# Patient Record
Sex: FEMALE | Race: WHITE | Employment: UNEMPLOYED | URBAN - METROPOLITAN AREA
[De-identification: names, ages, dates, MRNs, and addresses within clinical notes are randomized per-mention and may not be internally consistent; named-entity substitution may affect disease eponyms.]

---

## 2017-06-26 PROBLEM — D64.9 ANEMIA: Status: ACTIVE | Noted: 2017-06-26

## 2017-06-26 PROBLEM — R60.0 BILATERAL LEG EDEMA: Status: ACTIVE | Noted: 2017-06-26

## 2017-08-01 PROBLEM — I65.23 BILATERAL CAROTID ARTERY STENOSIS: Status: ACTIVE | Noted: 2017-08-01

## 2017-10-01 ENCOUNTER — HOSPITAL ENCOUNTER (INPATIENT)
Age: 82
LOS: 4 days | Discharge: HOME HEALTH | DRG: 065 | End: 2017-10-05
Attending: EMERGENCY MEDICINE | Admitting: INTERNAL MEDICINE
Payer: MEDICARE

## 2017-10-01 ENCOUNTER — APPOINTMENT (OUTPATIENT)
Dept: CT IMAGING | Facility: CLINIC | Age: 82
DRG: 065 | End: 2017-10-01
Payer: MEDICARE

## 2017-10-01 ENCOUNTER — APPOINTMENT (OUTPATIENT)
Dept: GENERAL RADIOLOGY | Facility: CLINIC | Age: 82
DRG: 065 | End: 2017-10-01
Payer: MEDICARE

## 2017-10-01 DIAGNOSIS — R26.81 UNSTEADINESS ON FEET: Primary | ICD-10-CM

## 2017-10-01 LAB
-: ABNORMAL
ABSOLUTE EOS #: 0.1 K/UL (ref 0–0.4)
ABSOLUTE LYMPH #: 1.5 K/UL (ref 1–4.8)
ABSOLUTE MONO #: 0.6 K/UL (ref 0.1–1.2)
AMORPHOUS: ABNORMAL
ANION GAP SERPL CALCULATED.3IONS-SCNC: 13 MMOL/L (ref 9–17)
BACTERIA: ABNORMAL
BASOPHILS # BLD: 1 %
BASOPHILS ABSOLUTE: 0.1 K/UL (ref 0–0.2)
BILIRUBIN URINE: NEGATIVE
BUN BLDV-MCNC: 20 MG/DL (ref 8–23)
BUN/CREAT BLD: ABNORMAL (ref 9–20)
CALCIUM SERPL-MCNC: 9.8 MG/DL (ref 8.6–10.4)
CASTS UA: ABNORMAL /LPF (ref 0–2)
CHLORIDE BLD-SCNC: 97 MMOL/L (ref 98–107)
CO2: 29 MMOL/L (ref 20–31)
COLOR: YELLOW
COMMENT UA: ABNORMAL
CREAT SERPL-MCNC: 0.8 MG/DL (ref 0.5–0.9)
CRYSTALS, UA: ABNORMAL /HPF
DIFFERENTIAL TYPE: NORMAL
EOSINOPHILS RELATIVE PERCENT: 2 %
EPITHELIAL CELLS UA: ABNORMAL /HPF (ref 0–5)
GFR AFRICAN AMERICAN: >60 ML/MIN
GFR NON-AFRICAN AMERICAN: >60 ML/MIN
GFR SERPL CREATININE-BSD FRML MDRD: ABNORMAL ML/MIN/{1.73_M2}
GFR SERPL CREATININE-BSD FRML MDRD: ABNORMAL ML/MIN/{1.73_M2}
GLUCOSE BLD-MCNC: 116 MG/DL (ref 70–99)
GLUCOSE URINE: NEGATIVE
HCT VFR BLD CALC: 39.2 % (ref 36–46)
HEMOGLOBIN: 13.2 G/DL (ref 12–16)
INR BLD: 1.1
KETONES, URINE: NEGATIVE
LEUKOCYTE ESTERASE, URINE: ABNORMAL
LYMPHOCYTES # BLD: 25 %
MCH RBC QN AUTO: 30.9 PG (ref 26–34)
MCHC RBC AUTO-ENTMCNC: 33.8 G/DL (ref 31–37)
MCV RBC AUTO: 91.4 FL (ref 80–100)
MONOCYTES # BLD: 11 %
MUCUS: ABNORMAL
NITRITE, URINE: NEGATIVE
OTHER OBSERVATIONS UA: ABNORMAL
PARTIAL THROMBOPLASTIN TIME: 28.8 SEC (ref 21.3–31.3)
PDW BLD-RTO: 13.5 % (ref 12.5–15.4)
PH UA: 5.5 (ref 5–8)
PLATELET # BLD: 245 K/UL (ref 140–450)
PLATELET ESTIMATE: NORMAL
PMV BLD AUTO: 7.9 FL (ref 6–12)
POTASSIUM SERPL-SCNC: 4.3 MMOL/L (ref 3.7–5.3)
PROTEIN UA: NEGATIVE
PROTHROMBIN TIME: 11.3 SEC (ref 9.4–12.6)
RBC # BLD: 4.29 M/UL (ref 4–5.2)
RBC # BLD: NORMAL 10*6/UL
RBC UA: ABNORMAL /HPF (ref 0–2)
RENAL EPITHELIAL, UA: ABNORMAL /HPF
SEG NEUTROPHILS: 61 %
SEGMENTED NEUTROPHILS ABSOLUTE COUNT: 3.7 K/UL (ref 1.8–7.7)
SODIUM BLD-SCNC: 139 MMOL/L (ref 135–144)
SPECIFIC GRAVITY UA: 1 (ref 1–1.03)
TRICHOMONAS: ABNORMAL
TROPONIN INTERP: NORMAL
TROPONIN T: <0.03 NG/ML
TURBIDITY: ABNORMAL
URINE HGB: ABNORMAL
UROBILINOGEN, URINE: NORMAL
WBC # BLD: 6.1 K/UL (ref 3.5–11)
WBC # BLD: NORMAL 10*3/UL
WBC UA: ABNORMAL /HPF (ref 0–5)
YEAST: ABNORMAL

## 2017-10-01 PROCEDURE — 99285 EMERGENCY DEPT VISIT HI MDM: CPT

## 2017-10-01 PROCEDURE — 6370000000 HC RX 637 (ALT 250 FOR IP): Performed by: EMERGENCY MEDICINE

## 2017-10-01 PROCEDURE — 71010 XR CHEST PORTABLE: CPT

## 2017-10-01 PROCEDURE — 6360000004 HC RX CONTRAST MEDICATION: Performed by: EMERGENCY MEDICINE

## 2017-10-01 PROCEDURE — 87086 URINE CULTURE/COLONY COUNT: CPT

## 2017-10-01 PROCEDURE — 80048 BASIC METABOLIC PNL TOTAL CA: CPT

## 2017-10-01 PROCEDURE — 81001 URINALYSIS AUTO W/SCOPE: CPT

## 2017-10-01 PROCEDURE — 1200000000 HC SEMI PRIVATE

## 2017-10-01 PROCEDURE — 85610 PROTHROMBIN TIME: CPT

## 2017-10-01 PROCEDURE — 2580000003 HC RX 258: Performed by: NURSE PRACTITIONER

## 2017-10-01 PROCEDURE — 6360000002 HC RX W HCPCS: Performed by: EMERGENCY MEDICINE

## 2017-10-01 PROCEDURE — 2580000003 HC RX 258: Performed by: EMERGENCY MEDICINE

## 2017-10-01 PROCEDURE — 36415 COLL VENOUS BLD VENIPUNCTURE: CPT

## 2017-10-01 PROCEDURE — 70450 CT HEAD/BRAIN W/O DYE: CPT

## 2017-10-01 PROCEDURE — 93005 ELECTROCARDIOGRAM TRACING: CPT

## 2017-10-01 PROCEDURE — 85730 THROMBOPLASTIN TIME PARTIAL: CPT

## 2017-10-01 PROCEDURE — 85025 COMPLETE CBC W/AUTO DIFF WBC: CPT

## 2017-10-01 PROCEDURE — 71260 CT THORAX DX C+: CPT

## 2017-10-01 PROCEDURE — 84484 ASSAY OF TROPONIN QUANT: CPT

## 2017-10-01 RX ORDER — SPIRONOLACTONE 25 MG/1
25 TABLET ORAL DAILY
Status: DISCONTINUED | OUTPATIENT
Start: 2017-10-02 | End: 2017-10-05 | Stop reason: HOSPADM

## 2017-10-01 RX ORDER — SODIUM CHLORIDE 9 MG/ML
INJECTION, SOLUTION INTRAVENOUS CONTINUOUS
Status: DISCONTINUED | OUTPATIENT
Start: 2017-10-01 | End: 2017-10-05 | Stop reason: HOSPADM

## 2017-10-01 RX ORDER — LEVOFLOXACIN 5 MG/ML
500 INJECTION, SOLUTION INTRAVENOUS ONCE
Status: COMPLETED | OUTPATIENT
Start: 2017-10-01 | End: 2017-10-01

## 2017-10-01 RX ORDER — CARVEDILOL 6.25 MG/1
6.25 TABLET ORAL 2 TIMES DAILY WITH MEALS
Status: DISCONTINUED | OUTPATIENT
Start: 2017-10-02 | End: 2017-10-05 | Stop reason: HOSPADM

## 2017-10-01 RX ORDER — SODIUM CHLORIDE 0.9 % (FLUSH) 0.9 %
10 SYRINGE (ML) INJECTION PRN
Status: DISCONTINUED | OUTPATIENT
Start: 2017-10-01 | End: 2017-10-01 | Stop reason: SDUPTHER

## 2017-10-01 RX ORDER — SODIUM CHLORIDE 0.9 % (FLUSH) 0.9 %
10 SYRINGE (ML) INJECTION EVERY 12 HOURS SCHEDULED
Status: DISCONTINUED | OUTPATIENT
Start: 2017-10-01 | End: 2017-10-05 | Stop reason: HOSPADM

## 2017-10-01 RX ORDER — LEVOFLOXACIN 5 MG/ML
250 INJECTION, SOLUTION INTRAVENOUS EVERY 24 HOURS
Status: DISCONTINUED | OUTPATIENT
Start: 2017-10-02 | End: 2017-10-05

## 2017-10-01 RX ORDER — ACETAMINOPHEN 325 MG/1
650 TABLET ORAL EVERY 4 HOURS PRN
Status: DISCONTINUED | OUTPATIENT
Start: 2017-10-01 | End: 2017-10-05 | Stop reason: HOSPADM

## 2017-10-01 RX ORDER — SODIUM CHLORIDE 0.9 % (FLUSH) 0.9 %
10 SYRINGE (ML) INJECTION PRN
Status: DISCONTINUED | OUTPATIENT
Start: 2017-10-01 | End: 2017-10-05 | Stop reason: HOSPADM

## 2017-10-01 RX ORDER — PRAVASTATIN SODIUM 40 MG
80 TABLET ORAL NIGHTLY
Status: DISCONTINUED | OUTPATIENT
Start: 2017-10-01 | End: 2017-10-05 | Stop reason: HOSPADM

## 2017-10-01 RX ORDER — ONDANSETRON 2 MG/ML
4 INJECTION INTRAMUSCULAR; INTRAVENOUS EVERY 6 HOURS PRN
Status: DISCONTINUED | OUTPATIENT
Start: 2017-10-01 | End: 2017-10-05 | Stop reason: HOSPADM

## 2017-10-01 RX ORDER — 0.9 % SODIUM CHLORIDE 0.9 %
70 INTRAVENOUS SOLUTION INTRAVENOUS ONCE
Status: COMPLETED | OUTPATIENT
Start: 2017-10-01 | End: 2017-10-01

## 2017-10-01 RX ADMIN — SODIUM CHLORIDE: 9 INJECTION, SOLUTION INTRAVENOUS at 23:35

## 2017-10-01 RX ADMIN — LEVOFLOXACIN 500 MG: 5 INJECTION, SOLUTION INTRAVENOUS at 21:04

## 2017-10-01 RX ADMIN — Medication 10 ML: at 18:17

## 2017-10-01 RX ADMIN — SODIUM CHLORIDE 70 ML: 9 INJECTION, SOLUTION INTRAVENOUS at 18:17

## 2017-10-01 RX ADMIN — FLUCONAZOLE 150 MG: 50 TABLET ORAL at 21:07

## 2017-10-01 RX ADMIN — IOVERSOL 70 ML: 741 INJECTION INTRA-ARTERIAL; INTRAVENOUS at 18:16

## 2017-10-01 NOTE — ED TRIAGE NOTES
Pt arrives via ambulance for frequent fall today, pt states she feels very weak and off balance, pt denies injury from falls and denies pain

## 2017-10-01 NOTE — IP AVS SNAPSHOT
Patient Information     Patient Name TAMY Shepherd 1932      Important Information for Stroke      If you have a current diagnosis or history of any of the following, please review the information carefully. STROKE PATIENTS:  If you notice any sign or symptom that indicates that you may be having a stroke, activate the emergency medical services immediately by calling [id number]. These symptoms include: uneven facial expression, arm(s) drifting down, strange speech or loss of speech, vision problems, sudden severe headache, sudden numbness or face/arms/legs, sudden confusion or difficult understanding, sudden dizziness, sudden difficulty with walking. Continue or begin taking the medications prescribed by your physician as listed above. There are personal risk that are associated with [place]. Anyone can have a stroke no matter your age, race or gender. The chances of having a stroke increase if you have certain risk factors. The best way to protect yourself and loved ones from stroke is to understand your personal risk and how to manage it. There are 2 types of risk factors for stroke: uncontrollable and controllable. Uncontrollable risk factors include being over age 54, being male, being ,  or /, or having a personal or family history of a stroke or transient ischemic attack (TIA). Controllable risk factors generally fall into two categories: lifestyle risk factors or medical risk factors. Lifestyle risk factors can often be changed, while medical risk factors can usually be treated. Both types can be managed best by working with a doctor, who can prescribe medications and advise on how to adopt a healthy lifestyle. Controllable risk factors include high blood pressure, atrial fibrillation, high cholesterol, diabetes, atherosclerosis, circulation problems, tobacco use or smoking, alcohol use, lack of exercise, and obesity.

## 2017-10-01 NOTE — IP AVS SNAPSHOT
After Visit Summary  (Discharge Instructions)    Medication List for Home    Based on the information you provided to us as well as any changes during this visit, the following is your updated medication list.  Compare this with your prescription bottles at home. If you have any questions or concerns, contact your primary care physician's office. Daily Medication List (This medication list can be shared with any healthcare provider who is helping you manage your medications)      There are NEW medications for you.  START taking them after you leave the hospital        Last Dose    Next Dose Due AM NOON PM NIGHT    acetaminophen 325 MG tablet   Commonly known as:  TYLENOL   Take 2 tablets by mouth every 4 hours as needed for Fever                650 mg on 10/5/2017  6:53 AM                            glimepiride 2 MG tablet   Commonly known as:  AMARYL   Take 1 tablet by mouth daily (with breakfast)                2 mg on 10/5/2017  8:03 AM     10/06/2017                          levofloxacin 500 MG tablet   Commonly known as:  LEVAQUIN   Take 1 tablet by mouth daily for 7 days                  10/06/2017                            You told us you were taking these medications at home, but the amount or how often you take this medication has CHANGED        Last Dose    Next Dose Due AM NOON PM NIGHT    acetaminophen-codeine 300-30 MG per tablet   Commonly known as:  TYLENOL #3   Take 2 tablets by mouth every 8 hours as needed for Pain   What changed:    - how much to take  - how to take this  - when to take this  - reasons to take this                2 tablets on 10/3/2017  7:17 PM                              These are medications you told us you were taking at home, CONTINUE taking them after you leave the hospital        Last Dose    Next Dose Due AM NOON PM NIGHT    CALCIUM + D PO   Take by mouth                  10/06/2017                          carvedilol 6.25 MG tablet Commonly known as:  COREG   TAKE (1) TABLET EVERY MORNING AND EVENING                6.25 mg on 10/5/2017  8:03 AM     10/06/2017                          docusate sodium 100 MG capsule   Commonly known as:  COLACE   Take 100 mg by mouth as needed for Constipation                                         DULERA 200-5 MCG/ACT inhaler   Generic drug:  mometasone-formoterol   Inhale 2 puffs into the lungs every 12 hours                2 puffs on 10/5/2017  9:15 AM     10/05/2017                          ELIQUIS 5 MG Tabs tablet   Generic drug:  apixaban   TAKE (1) TABLET EVERY MORNING AND EVENING                5 mg on 10/5/2017  8:03 AM     10/06/2017                          fluticasone 50 MCG/ACT nasal spray   Commonly known as:  FLONASE   1 spray by Nasal route daily                  10/06/2017                          ketoconazole 2 % cream   Commonly known as:  NIZORAL                                         omeprazole 20 MG delayed release capsule   Commonly known as:  PRILOSEC                  10/06/2017                          pravastatin 80 MG tablet   Commonly known as:  PRAVACHOL                80 mg on 10/4/2017 10:34 PM     10/05/2017                          spironolactone 25 MG tablet   Commonly known as:  ALDACTONE                25 mg on 10/5/2017  8:03 AM     10/06/2017                          SYMBICORT 160-4.5 MCG/ACT Aero   Generic drug:  budesonide-formoterol   Inhale 2 puffs into the lungs 2 times daily                  10/05/2017                          Vitamin D3 2000 units Caps   Take by mouth                  10/06/2017                               Where to Get Your Medications      You can get these medications from any pharmacy     Bring a paper prescription for each of these medications     acetaminophen-codeine 300-30 MG per tablet         Information about where to get these medications is not yet available     !  Ask your nurse or doctor about these medications acetaminophen 325 MG tablet    glimepiride 2 MG tablet    levofloxacin 500 MG tablet               Allergies as of 10/5/2017        Reactions    Ambien [Zolpidem Tartrate]     Amoxicillin     Metformin And Related       Immunizations as of 10/5/2017     Name Date Dose VIS Date Route    Influenza, Quadv, 3 yrs and older, IM, Preservative Free 10/2/2017 0.5 mL 2015 Intramuscular      Last Vitals          Most Recent Value    Temp  97.5 °F (36.4 °C)    Pulse  66    Resp  18    BP  (!)  146/49         After Visit Summary    This summary was created for you. Thank you for entrusting your care to us. The following information includes details about your hospital/visit stay along with steps you should take to help with your recovery once you leave the hospital.  In this packet, you will find information about the topics listed below:    · Instructions about your medications including a list of your home medications  · A summary of your hospital visit  · Follow-up appointments once you have left the hospital  · Your care plan at home      You may receive a survey regarding the care you received during your stay. Your input is valuable to us. We encourage you to complete and return your survey in the envelope provided. We hope you will choose us in the future for your healthcare needs. Patient Information     Patient Name TAMY Porter 1932      Care Provided at:     Name Address Phone       [place] [address] [phone]            Your Visit    Here you will find information about your visit, including the reason for your visit. Please take this sheet with you when you visit your doctor or other health care provider in the future. It will help determine the best possible medical care for you at that time. If you have any questions once you leave the hospital, please call the department phone number listed below. 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 Phone    10/1/2017 Denis Emmanuel, [address] [phone]       Follow-up Appointments    Below is a list of your follow-up and future appointments. This may not be a complete list as you may have made appointments directly with providers that we are not aware of or your providers may have made some for you. Please call your providers to confirm appointments. It is important to keep your appointments. Please bring your current insurance card, photo ID, co-pay, and all medication bottles to your appointment. If self-pay, payment is expected at the time of service. Follow-up Information     Follow up with [facility]. Specialty:  Home Health Services    Why:  Home Care    Contact information:    [address] [place] [address] [phone]        Follow up with Dr. Vipul Lewis.     Why:  Appointment 10-13-17 at 10:30am    Contact information:    [address] [facility]  Phone : [phone]        Follow up with Rosanne Frank MD.    Specialties:  Neurology, Neurosurgery    Why:  Follow-up with the endovascular neurosurgical team for possible angio and endovascular treatment    Contact information:    [address] [facility]  [phone]        Future Appointments     11/27/2017 1:45 PM     Appointment with MARY Camilo [address] [place] [address] at [facility] ([phone])   [address]  [address]  [address]       2017 2:45 PM     Appointment with Makenna Chandler MD at [facility] ([phone])   Please arrive 15 minutes prior to appointment, bring photo ID and insurance card. [address]  [address]        Date Due    Tetanus Combination Vaccine (1 - Tdap) 1951    Zoster Vaccine 1992    Osteoporosis screening or a bone density scan (Dexa) is recommended once at age 72. Based upon the results and risk factors for bone loss, your provider will recommend whether this needs to be repeated. 1997    Pneumococcal Vaccines (two) for all adults aged 72 and over (1 of 2 - PCV13) 1997                 Care Plan Once You Return Home    This section includes instructions you will need to follow once you leave the hospital.  Your care team will discuss these with you, so you and those caring for you know how to best care for your health needs at home. This section may also include educational information about certain health topics that may be of help to you. Discharge [address] [place] [address] Form    Patient Name: Xochilt Greene   :  1932  MRN:  [id number]    Admit date:  10/1/2017  Discharge date:  10/05/2017    Code Status Order: Full Code   Advance Directives:  Yes    Admitting Physician:  Rafia Staples DO  PCP: Mariah Hoover MD    Discharging Nurse: [facility] Unit/Room#:   Discharging Unit Phone Number: [phone]    Emergency Contact:   Contact 1: Name: Bishop Coelho  Contact 1: Number: [phone]  Contact 1: Relationship: Son    Past Surgical History:  Past Surgical History:   Procedure Laterality Date    CORONARY ARTERY BYPASS GRAFT      PACEMAKER PLACEMENT         Immunization History:   Immunization History Safety Concerns:     History of Falls (last 30 days) and At Risk for Falls    Impairments/Disabilities:      Vision and Hearing    Nutrition Therapy:  Current Nutrition Therapy:   - Oral Diet:  Carb Control 4 carbs/meal (1800kcals/day) and Cardiac    Routes of Feeding: Oral  Liquids: No Restrictions  Daily Fluid Restriction: no  Last Modified Barium Swallow with Video (Video Swallowing Test): not done    Treatments at the Time of Hospital Discharge:   Respiratory Treatments: yes  Oxygen Therapy:  is not on home oxygen therapy. Ventilator:    - No ventilator support    Rehab Therapies: Physical Therapy and Occupational Therapy  Weight Bearing Status/Restrictions: No weight bearing restirctions  Other Medical Equipment (for information only, NOT a DME order):  walker, bath bench and bedside commode  Other Treatments: Skilled nursing assessment, med teaching and compliance    Patient's personal belongings (please select all that are sent with patient):  Glasses    RN SIGNATURE:  Electronically signed by Loida Kaur RN on 10/5/17 at 2:03 PM    PHYSICIAN SECTION    Prognosis: Good    Condition at Discharge: Stable    Rehab Potential (if transferring to Rehab): Good    Recommended Labs or Other Treatments After Discharge: arrange follow-up with the endovascular neurosurgical team for possible angio and endovascular treatment of anterior communicating aneurysm    Physician Certification: I certify the above information and transfer of Garima Cabrales  is necessary for the continuing treatment of the diagnosis listed and that she requires [facility] for less 30 days.      Update Admission H&P: No change in H&P    PHYSICIAN SIGNATURE:  Electronically signed by Lin Zambrano DO on 10/5/17 at 11:15 AM    CASE MANAGEMENT/SOCIAL WORK SECTION    Inpatient Status Date: ***    [facility]:  Risk Score: 1   (Score > 14= high risk for readmission) Discharging to Facility/ Agency   · Name: Jocelin Miller  · Address:  · Phone: [phone]  · Fax: [phone]      / signature: Electronically signed by FARHAN Vazquez on 10/4/17 at 11:47 AM        [facility] [address] allows you to send messages to your doctor, view your test results, renew your prescriptions, schedule appointments, view visit notes, and more. How Do I Sign Up? 1. In your Internet browser, go to [email or web address]  2. Click on the Sign Up Now link in the Sign In box. You will see the New Member Sign Up page. 3. Enter your Peppercorn Access Code exactly as it appears below. You will not need to use this code after youve completed the sign-up process. If you do not sign up before the expiration date, you must request a new code. Peppercorn Access Code: [email or web address]  Expires: 12/3/2017  9:02 AM    4. Enter your Social Security Number ([id number]) and Date of Birth (mm/dd/yyyy) as indicated and click Submit. You will be taken to the next sign-up page. 5. Create a Peppercorn ID. This will be your Peppercorn login ID and cannot be changed, so think of one that is secure and easy to remember. 6. Create a Peppercorn password. You can change your password at any time. 7. Enter your Password Reset Question and Answer. This can be used at a later time if you forget your password. 8. Enter your e-mail address. You will receive e-mail notification when new information is available in [address]. 9. Click Sign Up. You can now view your medical record. Additional Information  If you have questions, please contact the physician practice where you receive care. Remember, Peppercorn is NOT to be used for urgent needs. For medical emergencies, dial 911. For questions regarding your Peppercorn account call [phone]. If you have a clinical question, please call your doctor's office.          View your information online Emergency treatment is done to stop the bleeding and prevent damage to the brain. · You may need surgery to repair an aneurysm or to remove the blood that has built up inside the brain. · You may be given medicine to stop the bleeding. · You will be closely watched for signs of increased pressure on the brain. These signs include restlessness, confusion, trouble following commands, and headache. · You may take medicine to manage high blood pressure. Ask your doctor if a stroke rehab program is right for you. Rehab increases your chances of getting back some of the abilities you lost.  Follow-up care is a key part of your treatment and safety. Be sure to make and go to all appointments, and call your doctor if you are having problems. It's also a good idea to know your test results and keep a list of the medicines you take. How can you prevent another stroke? · Work with your doctor to treat health problems, such as high blood pressure, that raise your chances of having another stroke. · Be safe with medicines. Take your medicine exactly as prescribed. Call your doctor if you think you are having a problem with your medicine. · Have a healthy lifestyle. ¨ Do not smoke or allow others to smoke around you. If you need help quitting, talk to your doctor about stop-smoking programs and medicines. These can increase your chances of quitting for good. Smoking makes a stroke more likely. ¨ Limit alcohol to 2 drinks a day for men and 1 drink a day for women. ¨ Be active. Ask your doctor what type and level of activity is safe for you. ¨ Eat heart-healthy foods, like fruits, vegetables, and high-fiber foods. ¨ Stay at a healthy weight. Lose weight if you need to. Where can you learn more? Go to [email or web address]://[email or web address] and sign in to your Hastify account. Enter R416 in the [facility] box to learn more about \"Learning About a Hemorrhagic Stroke. \" If you do not have an account, please click on the \"Sign Up Now\" link. Current as of: March 20, 2017  Content Version: 11.3  © 5078-7264 [employer]. Care instructions adapted under license by [facility] ([facility]). If you have questions about a medical condition or this instruction, always ask your healthcare professional. [address] any warranty or liability for your use of this information. Chronic Obstructive Pulmonary Disease (COPD): Care Instructions  Your Care Instructions    Chronic obstructive pulmonary disease (COPD) is a general term for a group of lung diseases, including emphysema and chronic bronchitis. People with COPD have decreased airflow in and out of the lungs, which makes it hard to breathe. The airways also can get clogged with thick mucus. Cigarette smoking is a major cause of COPD. Although there is no cure for COPD, you can slow its progress. Following your treatment plan and taking care of yourself can help you feel better and live longer. Follow-up care is a key part of your treatment and safety. Be sure to make and go to all appointments, and call your doctor if you are having problems. It's also a good idea to know your test results and keep a list of the medicines you take. How can you care for yourself at home? Staying healthy  · Do not smoke. This is the most important step you can take to prevent more damage to your lungs. If you need help quitting, talk to your doctor about stop-smoking programs and medicines. These can increase your chances of quitting for good. · Avoid colds and flu. Get a pneumococcal vaccine shot. If you have had one before, ask your doctor whether you need a second dose. Get the flu vaccine every fall. If you must be around people with colds or the flu, wash your hands often. · Avoid secondhand smoke, air pollution, and high altitudes.  Also avoid cold, dry air and hot, humid air. Stay at home with your windows closed when air pollution is bad. Medicines and oxygen therapy  · Take your medicines exactly as prescribed. Call your doctor if you think you are having a problem with your medicine. · You may be taking medicines such as:  ¨ Bronchodilators. These help open your airways and make breathing easier. Bronchodilators are either short-acting (work for 6 to 9 hours) or long-acting (work for 24 hours). You inhale most bronchodilators, so they start to act quickly. Always carry your quick-relief inhaler with you in case you need it while you are away from home. ¨ Corticosteroids (prednisone, budesonide). These reduce airway inflammation. They come in pill or inhaled form. You must take these medicines every day for them to work well. · A spacer may help you get more inhaled medicine to your lungs. Ask your doctor or pharmacist if a spacer is right for you. If it is, ask how to use it properly. · Do not take any vitamins, over-the-counter medicine, or herbal products without talking to your doctor first.  · If your doctor prescribed antibiotics, take them as directed. Do not stop taking them just because you feel better. You need to take the full course of antibiotics. · Oxygen therapy boosts the amount of oxygen in your blood and helps you breathe easier. Use the flow rate your doctor has recommended, and do not change it without talking to your doctor first.  Activity  · Get regular exercise. Walking is an easy way to get exercise. Start out slowly, and walk a little more each day. · Pay attention to your breathing. You are exercising too hard if you cannot talk while you are exercising. · Take short rest breaks when doing household chores and other activities. · Learn breathing methodssuch as breathing through pursed lipsto help you become less short of breath.   · If your doctor has not set you up with a pulmonary rehabilitation program, talk to him or her about whether rehab is right for you. Rehab includes exercise programs, education about your disease and how to manage it, help with diet and other changes, and emotional support. Diet  · Eat regular, healthy meals. Use bronchodilators about 1 hour before you eat to make it easier to eat. Eat several small meals instead of three large ones. Drink beverages at the end of the meal. Avoid foods that are hard to chew. · Eat foods that contain protein so that you do not lose muscle mass. · Talk with your doctor if you gain too much weight or if you lose weight without trying. Mental health  · Talk to your family, friends, or a therapist about your feelings. It is normal to feel frightened, angry, hopeless, helpless, and even guilty. Talking openly about bad feelings can help you cope. If these feelings last, talk to your doctor. When should you call for help? Call 911 anytime you think you may need emergency care. For example, call if:  · You have severe trouble breathing. Call your doctor now or seek immediate medical care if:  · You have new or worse trouble breathing. · You cough up blood. · You have a fever. Watch closely for changes in your health, and be sure to contact your doctor if:  · You cough more deeply or more often, especially if you notice more mucus or a change in the color of your mucus. · You have new or worse swelling in your legs or belly. · You are not getting better as expected. Where can you learn more? Go to https://Saqinamagalys.PingStamp. org and sign in to your Alignment Acquisitions account. Enter I369 in the [facility] box to learn more about \"Chronic Obstructive Pulmonary Disease (COPD): Care Instructions. \"     If you do not have an account, please click on the \"Sign Up Now\" link. Current as of: March 25, 2017  Content Version: 11.3  © 2780-0368 [employer].  Care instructions adapted under license by [facility] ([facility]). If you have questions about a medical condition or this instruction, always ask your healthcare professional. [place] [address] any warranty or liability for your use of this information. Heart Failure: Care Instructions  Your Care Instructions    Heart failure occurs when your heart does not pump as much blood as the body needs. Failure does not mean that the heart has stopped pumping but rather that it is not pumping as well as it should. Over time, this causes fluid buildup in your lungs and other parts of your body. Fluid buildup can cause shortness of breath, fatigue, swollen ankles, and other problems. By taking medicines regularly, reducing sodium (salt) in your diet, checking your weight every day, and making lifestyle changes, you can feel better and live longer. Follow-up care is a key part of your treatment and safety. Be sure to make and go to all appointments, and call your doctor if you are having problems. It's also a good idea to know your test results and keep a list of the medicines you take. How can you care for yourself at home? Medicines  · Be safe with medicines. Take your medicines exactly as prescribed. Call your doctor if you think you are having a problem with your medicine. · Do not take any vitamins, over-the-counter medicine, or herbal products without talking to your doctor first. Darrick Wellsast not take ibuprofen (Advil or Motrin) and naproxen (Aleve) without talking to your doctor first. They could make your heart failure worse. · You may be taking some of the following medicine. ¨ Beta-blockers can slow heart rate, decrease blood pressure, and improve your condition. Taking a beta-blocker may lower your chance of needing to be hospitalized. ¨ Angiotensin-converting enzyme inhibitors (ACEIs) reduce the heart's workload, lower blood pressure, and reduce swelling.  Taking an ACEI may lower your chance of needing to be hospitalized again. ¨ Angiotensin II receptor blockers (ARBs) work like ACEIs. Your doctor may prescribe them instead of ACEIs. ¨ Diuretics, also called water pills, reduce swelling. ¨ Potassium supplements replace this important mineral, which is sometimes lost with diuretics. ¨ Aspirin and other blood thinners prevent blood clots, which can cause a stroke or heart attack. You will get more details on the specific medicines your doctor prescribes. Diet  · Your doctor may suggest that you limit sodium to 2,000 milligrams (mg) a day or less. That is less than 1 teaspoon of salt a day, including all the salt you eat in cooking or in packaged foods. People get most of their sodium from processed foods. Fast food and restaurant meals also tend to be very high in sodium. · Ask your doctor how much liquid you can drink each day. You may have to limit liquids. Weight  · Weigh yourself without clothing at the same time each day. Record your weight. Call your doctor if you have a sudden weight gain, such as more than 2 to 3 pounds in a day or 5 pounds in a week. (Your doctor may suggest a different range of weight gain.) A sudden weight gain may mean that your heart failure is getting worse. Activity level  · Start light exercise (if your doctor says it is okay). Even if you can only do a small amount, exercise will help you get stronger, have more energy, and manage your weight and your stress. Walking is an easy way to get exercise. Start out by walking a little more than you did before. Bit by bit, increase the amount you walk. · When you exercise, watch for signs that your heart is working too hard. You are pushing yourself too hard if you cannot talk while you are exercising.  If you become short of breath or dizzy or have chest pain, stop, sit down, and rest.  · If you feel \"wiped out\" the day after you exercise, walk slower or for a · You have a fast-growing, painful lump at the catheter site. · You have signs of infection, such as:  ¨ Increased pain, swelling, warmth, or redness. ¨ Red streaks leading from the catheter site. ¨ Pus draining from the catheter site. ¨ A fever. · Your leg or arm looks blue or feels cold, numb, or tingly. Watch closely for changes in your health, and be sure to contact your doctor if you have any problems. Where can you learn more? Go to https://[employer].mydala. org and sign in to your eduFire account. Enter O278 in the [employer] box to learn more about \"Carotid Angiogram: What to Expect at Home. \"     If you do not have an account, please click on the \"Sign Up Now\" link. Current as of: March 20, 2017  Content Version: 11.3  © 0211-2203 xTV, [employer]. Care instructions adapted under license by [facility] [address] If you have questions about a medical condition or this instruction, always ask your healthcare professional. [place] [address] any warranty or liability for your use of this information.

## 2017-10-01 NOTE — ED PROVIDER NOTES
and Unspecified essential hypertension. SURGICAL HISTORY      has a past surgical history that includes Coronary artery bypass graft and pacemaker placement. CURRENT MEDICATIONS       Previous Medications    ACETAMINOPHEN-CODEINE (TYLENOL #3) 300-30 MG PER TABLET        BUDESONIDE-FORMOTEROL (SYMBICORT) 160-4.5 MCG/ACT AERO    Inhale 2 puffs into the lungs 2 times daily    CALCIUM CARBONATE-VITAMIN D (CALCIUM + D PO)    Take by mouth    CARVEDILOL (COREG) 6.25 MG TABLET    TAKE (1) TABLET EVERY MORNING AND EVENING    CHOLECALCIFEROL (VITAMIN D3) 2000 UNITS CAPS    Take by mouth    DOCUSATE SODIUM (COLACE) 100 MG CAPSULE    Take 100 mg by mouth as needed for Constipation    ELIQUIS 5 MG TABS TABLET    TAKE (1) TABLET EVERY MORNING AND EVENING    FLUTICASONE (FLONASE) 50 MCG/ACT NASAL SPRAY    1 spray by Nasal route daily    KETOCONAZOLE (NIZORAL) 2 % CREAM        MOMETASONE-FORMOTEROL (DULERA) 200-5 MCG/ACT INHALER    Inhale 2 puffs into the lungs every 12 hours    OMEPRAZOLE (PRILOSEC) 20 MG DELAYED RELEASE CAPSULE        PRAVASTATIN (PRAVACHOL) 80 MG TABLET        SPIRONOLACTONE (ALDACTONE) 25 MG TABLET           ALLERGIES     is allergic to Aníbal Schein tartrate]; amoxicillin; and metformin and related. FAMILY HISTORY     has no family status information on file. Family history is unknown by patient. SOCIAL HISTORY      reports that she has never smoked. She has never used smokeless tobacco. She reports that she does not drink alcohol or use illicit drugs. PHYSICAL EXAM     INITIAL VITALS:  height is 5' 3\" (1.6 m) and weight is 69.4 kg (153 lb). Her oral temperature is 97.8 °F (36.6 °C). Her blood pressure is 140/65 (abnormal) and her pulse is 85. Her oxygen saturation is 97%.      Constitutional: Alert, oriented x3, nontoxic, afebrile, answering questions appropriately, acting properly for age, in no acute distress   HEENT: Extraocular muscles intact, mucus membranes moist, no photophobia Neck: Trachea midline, Supple without lymphadenopathy, no posterior midline neck tenderness to palpation no meningismus  Cardiovascular: Irregular rhythm and rate no S3, S4, or murmurs   Respiratory: Clear to auscultation bilaterally no wheezes, rhonchi, rales, no respiratory distress no tachypnea no retractions no hypoxia  Gastrointestinal: Soft, nontender, nondistended, positive bowel sounds. No rebound, rigidity, or guarding. Musculoskeletal: No extremity pain or swelling   Neurologic: Moving all 4 extremities without difficulty there are no gross focal neurologic deficits finger to nose and heel to shin normal.  Skin: Warm and dry     DIFFERENTIAL DIAGNOSIS/ MDM:     IV, labs, EKG, chest x-ray, CT head. DIAGNOSTIC RESULTS     EKG: All EKG's are interpreted by the Emergency Department Physician who either signs or Co-signs this chart in the absence of a cardiologist.    1641 irregular rhythm atrial fibrillation rate 71 VT undetectable QRS 84  no acute ST or T wave changes. Positive PVC. Not indicated unless otherwise documented above    LABS:  No results found for this visit on 10/01/17. Not indicated unless otherwise documented above    RADIOLOGY:   I reviewed the radiologist interpretations:    CT Head WO Contrast    (Results Pending)   XR Chest Portable    (Results Pending)       Not indicated unless otherwise documented above    EMERGENCY DEPARTMENT COURSE:     The patient was given the following medications:  No orders of the defined types were placed in this encounter. Vitals:    Vitals:    10/01/17 1647   BP: (!) 140/65   Pulse: 85   Temp: 97.8 °F (36.6 °C)   TempSrc: Oral   SpO2: 97%   Weight: 69.4 kg (153 lb)   Height: 5' 3\" (1.6 m)     -------------------------  BP (!) 140/65  Pulse 85  Temp 97.8 °F (36.6 °C) (Oral)   Ht 5' 3\" (1.6 m)  Wt 69.4 kg (153 lb)  SpO2 97%  BMI 27.1 kg/m2    Chest xray demonstrates Possible mediastinal mass recommending CAT scan.   Reevaluated multiple times resting comfortably in no acute distress. 7:15 PM awaiting CAT scan results. CT of head unremarkable. Patient unsteady on her feet 2 person assist.  Care transferred to Dr. Dasha Wilkerson. I have reviewed the disposition diagnosis with the patient and or their family/guardian. I have answered their questions and given discharge instructions. They voiced understanding of these instructions and did not have any further questions or complaints. CRITICAL CARE:    None    CONSULTS:    None    PROCEDURES:    None      OARRS Report if indicated             FINAL IMPRESSION    No diagnosis found. DISPOSITION/PLAN   DISPOSITION       PATIENT REFERRED TO:  No follow-up provider specified.     DISCHARGE MEDICATIONS:  New Prescriptions    No medications on file       (Please note that portions of this note were completed with a voice recognition program.  Efforts were made to edit the dictations but occasionally words are mis-transcribed.)    José Benitez,   Attending Emergency Physician       José Benitez DO  10/05/17 8616

## 2017-10-01 NOTE — ED NOTES
Pt report received from Carlos Max, [address] [place] [address].       Bria Moore RN  10/01/17 3397

## 2017-10-02 PROBLEM — R33.8 ACUTE URINARY RETENTION: Status: ACTIVE | Noted: 2017-10-02

## 2017-10-02 PROBLEM — I82.411: Chronic | Status: ACTIVE | Noted: 2017-10-02

## 2017-10-02 PROBLEM — R53.83 LETHARGY: Status: ACTIVE | Noted: 2017-10-02
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
## 2017-10-02 PROBLEM — R27.0 ATAXIA: Status: ACTIVE | Noted: 2017-10-02

## 2017-10-02 PROBLEM — N30.00 ACUTE CYSTITIS WITHOUT HEMATURIA: Status: ACTIVE | Noted: 2017-10-02

## 2017-10-02 LAB
ABSOLUTE EOS #: 0.1 K/UL (ref 0–0.4)
ABSOLUTE LYMPH #: 2 K/UL (ref 1–4.8)
ABSOLUTE MONO #: 0.9 K/UL (ref 0.2–0.8)
ALBUMIN SERPL-MCNC: 3.6 G/DL (ref 3.5–5.2)
ALBUMIN/GLOBULIN RATIO: ABNORMAL (ref 1–2.5)
ALP BLD-CCNC: 51 U/L (ref 35–104)
ALT SERPL-CCNC: 11 U/L (ref 5–33)
ANION GAP SERPL CALCULATED.3IONS-SCNC: 10 MMOL/L (ref 9–17)
AST SERPL-CCNC: 16 U/L
BASOPHILS # BLD: 1 %
BASOPHILS ABSOLUTE: 0.1 K/UL (ref 0–0.2)
BILIRUB SERPL-MCNC: 0.22 MG/DL (ref 0.3–1.2)
BUN BLDV-MCNC: 18 MG/DL (ref 8–23)
BUN/CREAT BLD: 21 (ref 9–20)
CALCIUM SERPL-MCNC: 9 MG/DL (ref 8.6–10.4)
CHLORIDE BLD-SCNC: 101 MMOL/L (ref 98–107)
CO2: 29 MMOL/L (ref 20–31)
CREAT SERPL-MCNC: 0.86 MG/DL (ref 0.5–0.9)
CULTURE: NORMAL
CULTURE: NORMAL
DIFFERENTIAL TYPE: ABNORMAL
EOSINOPHILS RELATIVE PERCENT: 2 %
GFR AFRICAN AMERICAN: >60 ML/MIN
GFR NON-AFRICAN AMERICAN: >60 ML/MIN
GFR SERPL CREATININE-BSD FRML MDRD: ABNORMAL ML/MIN/{1.73_M2}
GFR SERPL CREATININE-BSD FRML MDRD: ABNORMAL ML/MIN/{1.73_M2}
GLUCOSE BLD-MCNC: 115 MG/DL (ref 70–99)
GLUCOSE BLD-MCNC: 129 MG/DL (ref 65–105)
GLUCOSE BLD-MCNC: 130 MG/DL (ref 65–105)
GLUCOSE BLD-MCNC: 233 MG/DL (ref 65–105)
HCT VFR BLD CALC: 33.7 % (ref 36–46)
HEMOGLOBIN: 11.5 G/DL (ref 12–16)
INR BLD: 4.8
LYMPHOCYTES # BLD: 28 %
Lab: NORMAL
MCH RBC QN AUTO: 31.2 PG (ref 26–34)
MCHC RBC AUTO-ENTMCNC: 34 G/DL (ref 31–37)
MCV RBC AUTO: 91.9 FL (ref 80–100)
MONOCYTES # BLD: 13 %
MYOGLOBIN: 42 NG/ML (ref 25–58)
PDW BLD-RTO: 13.2 % (ref 11.5–14.5)
PLATELET # BLD: 195 K/UL (ref 130–400)
PLATELET ESTIMATE: ABNORMAL
PMV BLD AUTO: ABNORMAL FL (ref 6–12)
POTASSIUM SERPL-SCNC: 3.7 MMOL/L (ref 3.7–5.3)
PROTHROMBIN TIME: 52.5 SEC (ref 9.7–11.6)
RBC # BLD: 3.67 M/UL (ref 4–5.2)
RBC # BLD: ABNORMAL 10*6/UL
SEG NEUTROPHILS: 56 %
SEGMENTED NEUTROPHILS ABSOLUTE COUNT: 4 K/UL (ref 1.8–7.7)
SODIUM BLD-SCNC: 140 MMOL/L (ref 135–144)
SPECIMEN DESCRIPTION: NORMAL
SPECIMEN DESCRIPTION: NORMAL
STATUS: NORMAL
TOTAL PROTEIN: 6.3 G/DL (ref 6.4–8.3)
TROPONIN INTERP: NORMAL
TROPONIN T: <0.03 NG/ML
WBC # BLD: 7.1 K/UL (ref 3.5–11)
WBC # BLD: ABNORMAL 10*3/UL

## 2017-10-02 PROCEDURE — 85025 COMPLETE CBC W/AUTO DIFF WBC: CPT

## 2017-10-02 PROCEDURE — 6370000000 HC RX 637 (ALT 250 FOR IP): Performed by: NURSE PRACTITIONER

## 2017-10-02 PROCEDURE — 99220 PR INITIAL OBSERVATION CARE/DAY 70 MINUTES: CPT | Performed by: NURSE PRACTITIONER

## 2017-10-02 PROCEDURE — 83874 ASSAY OF MYOGLOBIN: CPT

## 2017-10-02 PROCEDURE — 36415 COLL VENOUS BLD VENIPUNCTURE: CPT

## 2017-10-02 PROCEDURE — 84484 ASSAY OF TROPONIN QUANT: CPT

## 2017-10-02 PROCEDURE — 94760 N-INVAS EAR/PLS OXIMETRY 1: CPT

## 2017-10-02 PROCEDURE — G0378 HOSPITAL OBSERVATION PER HR: HCPCS

## 2017-10-02 PROCEDURE — 82947 ASSAY GLUCOSE BLOOD QUANT: CPT

## 2017-10-02 PROCEDURE — 97162 PT EVAL MOD COMPLEX 30 MIN: CPT

## 2017-10-02 PROCEDURE — 6360000002 HC RX W HCPCS: Performed by: NURSE PRACTITIONER

## 2017-10-02 PROCEDURE — 2580000003 HC RX 258: Performed by: NURSE PRACTITIONER

## 2017-10-02 PROCEDURE — 1200000000 HC SEMI PRIVATE

## 2017-10-02 PROCEDURE — 85610 PROTHROMBIN TIME: CPT

## 2017-10-02 PROCEDURE — 51798 US URINE CAPACITY MEASURE: CPT

## 2017-10-02 PROCEDURE — 80053 COMPREHEN METABOLIC PANEL: CPT

## 2017-10-02 PROCEDURE — 90686 IIV4 VACC NO PRSV 0.5 ML IM: CPT | Performed by: INTERNAL MEDICINE

## 2017-10-02 PROCEDURE — 97110 THERAPEUTIC EXERCISES: CPT

## 2017-10-02 PROCEDURE — 97530 THERAPEUTIC ACTIVITIES: CPT

## 2017-10-02 PROCEDURE — 51701 INSERT BLADDER CATHETER: CPT

## 2017-10-02 PROCEDURE — 94640 AIRWAY INHALATION TREATMENT: CPT

## 2017-10-02 PROCEDURE — 6360000002 HC RX W HCPCS: Performed by: INTERNAL MEDICINE

## 2017-10-02 PROCEDURE — G0008 ADMIN INFLUENZA VIRUS VAC: HCPCS | Performed by: INTERNAL MEDICINE

## 2017-10-02 RX ORDER — GLIMEPIRIDE 2 MG/1
2 TABLET ORAL
Status: DISCONTINUED | OUTPATIENT
Start: 2017-10-03 | End: 2017-10-05 | Stop reason: HOSPADM

## 2017-10-02 RX ORDER — ACETAMINOPHEN AND CODEINE PHOSPHATE 300; 30 MG/1; MG/1
2 TABLET ORAL EVERY 8 HOURS PRN
Status: DISCONTINUED | OUTPATIENT
Start: 2017-10-02 | End: 2017-10-05 | Stop reason: HOSPADM

## 2017-10-02 RX ORDER — ACETAMINOPHEN AND CODEINE PHOSPHATE 300; 30 MG/1; MG/1
1 TABLET ORAL EVERY 8 HOURS PRN
Status: DISCONTINUED | OUTPATIENT
Start: 2017-10-02 | End: 2017-10-02

## 2017-10-02 RX ADMIN — ACETAMINOPHEN AND CODEINE PHOSPHATE 2 TABLET: 300; 30 TABLET ORAL at 10:49

## 2017-10-02 RX ADMIN — APIXABAN 5 MG: 5 TABLET, FILM COATED ORAL at 09:26

## 2017-10-02 RX ADMIN — CARVEDILOL 6.25 MG: 6.25 TABLET, FILM COATED ORAL at 08:07

## 2017-10-02 RX ADMIN — APIXABAN 5 MG: 5 TABLET, FILM COATED ORAL at 21:55

## 2017-10-02 RX ADMIN — MOMETASONE FUROATE AND FORMOTEROL FUMARATE DIHYDRATE 2 PUFF: 200; 5 AEROSOL RESPIRATORY (INHALATION) at 20:52

## 2017-10-02 RX ADMIN — CARVEDILOL 6.25 MG: 6.25 TABLET, FILM COATED ORAL at 18:41

## 2017-10-02 RX ADMIN — ACETAMINOPHEN 650 MG: 325 TABLET ORAL at 22:03

## 2017-10-02 RX ADMIN — Medication 10 ML: at 00:45

## 2017-10-02 RX ADMIN — PRAVASTATIN SODIUM 80 MG: 40 TABLET ORAL at 21:55

## 2017-10-02 RX ADMIN — LEVOFLOXACIN 250 MG: 5 INJECTION, SOLUTION INTRAVENOUS at 21:54

## 2017-10-02 RX ADMIN — MOMETASONE FUROATE AND FORMOTEROL FUMARATE DIHYDRATE 2 PUFF: 200; 5 AEROSOL RESPIRATORY (INHALATION) at 10:43

## 2017-10-02 RX ADMIN — SPIRONOLACTONE 25 MG: 25 TABLET, FILM COATED ORAL at 08:07

## 2017-10-02 RX ADMIN — ACETAMINOPHEN AND CODEINE PHOSPHATE 2 TABLET: 300; 30 TABLET ORAL at 00:56

## 2017-10-02 RX ADMIN — APIXABAN 5 MG: 5 TABLET, FILM COATED ORAL at 00:42

## 2017-10-02 RX ADMIN — INFLUENZA A VIRUS A/SINGAPORE/GP1908/2015 IVR-180A (H1N1) ANTIGEN (PROPIOLACTONE INACTIVATED), INFLUENZA A VIRUS A/HONG KONG/4801/2014 X-263B (H3N2) ANTIGEN (PROPIOLACTONE INACTIVATED), INFLUENZA B VIRUS B/BRISBANE/46/2015 ANTIGEN (PROPIOLACTONE INACTIVATED), AND INFLUENZA B VIRUS B/PHUKET/3073/2013 BVR-1B ANTIGEN (PROPIOLACTONE INACTIVATED) 0.5 ML: 15; 15; 15; 15 INJECTION, SUSPENSION INTRAMUSCULAR at 18:42

## 2017-10-02 RX ADMIN — PRAVASTATIN SODIUM 80 MG: 40 TABLET ORAL at 00:42

## 2017-10-02 ASSESSMENT — PAIN DESCRIPTION - DESCRIPTORS
DESCRIPTORS: BURNING
DESCRIPTORS: ACHING;BURNING
DESCRIPTORS: ACHING;BURNING

## 2017-10-02 ASSESSMENT — PAIN DESCRIPTION - FREQUENCY
FREQUENCY: CONTINUOUS
FREQUENCY: CONTINUOUS
FREQUENCY: INTERMITTENT

## 2017-10-02 ASSESSMENT — PAIN DESCRIPTION - PAIN TYPE
TYPE: CHRONIC PAIN

## 2017-10-02 ASSESSMENT — PAIN SCALES - GENERAL
PAINLEVEL_OUTOF10: 0
PAINLEVEL_OUTOF10: 5
PAINLEVEL_OUTOF10: 3
PAINLEVEL_OUTOF10: 6
PAINLEVEL_OUTOF10: 3
PAINLEVEL_OUTOF10: 2
PAINLEVEL_OUTOF10: 6

## 2017-10-02 ASSESSMENT — PAIN DESCRIPTION - ONSET
ONSET: ON-GOING

## 2017-10-02 ASSESSMENT — PAIN DESCRIPTION - ORIENTATION
ORIENTATION: RIGHT;LEFT

## 2017-10-02 ASSESSMENT — PAIN DESCRIPTION - LOCATION
LOCATION: LEG

## 2017-10-02 NOTE — FLOWSHEET NOTE
10/02/17 0747   Provider Notification   Reason for Communication Critical Value (comment)  (INR 4.8)   Provider Name Dr. Dubon Click   Provider Notification Physician   Method of Communication Page   Response Waiting for response   Notification Time 0644     Dr. Marielena Glass notified of elevated INR. Patient taking eliquis, not coumadin. No new orders at this time.   Electronically signed by Ofe Morrison RN on 10/2/2017 at 7:52 AM

## 2017-10-02 NOTE — PROGRESS NOTES
Physical Therapy    Facility/Department: [facility]  Initial Assessment    NAME: Marcela Abdullahi  : 1932  MRN: [id number]    Date of Service: 10/2/2017  RN reports patient is medically stable for therapy treatment this date. Chart reviewed prior to treatment and patient is agreeable for therapy. All lines intact and patient positioned comfortably at end of treatment. All patient needs addressed prior to ending therapy session. Chief Complaint   Patient presents with    Fatigue    Fall   Pending further testing - pt just admitted. Patient Diagnosis(es): The encounter diagnosis was Unsteadiness on feet.     has a past medical history of Atrial fibrillation ([facility] [address] CAD (coronary artery disease); CHF (congestive heart failure) ([facility] [address] COPD (chronic obstructive pulmonary disease) ([facility] [address] Esophageal reflux; Other and unspecified hyperlipidemia; Other primary cardiomyopathies; Type II or unspecified type diabetes mellitus without mention of complication, not stated as uncontrolled; Unspecified asthma; and Unspecified essential hypertension. has a past surgical history that includes Coronary artery bypass graft and pacemaker placement.     Restrictions  Restrictions/Precautions  Restrictions/Precautions: Fall Risk  Vision/Hearing  Vision: Impaired  Vision Exceptions: Wears glasses for distance  Hearing: Exceptions to [facility]  Hearing Exceptions: Hard of hearing/hearing concerns     Subjective  General  Chart Reviewed: Yes  Patient assessed for rehabilitation services?: Yes  Response To Previous Treatment: Not applicable  Family / Caregiver Present: No  Follows Commands: Within Functional Limits  Pain Screening  Patient Currently in Pain: Denies  Vital Signs  Patient Currently in Pain: Denies       Orientation  Orientation  Overall Orientation Status: Within Functional Limits    Social/Functional History  Social/Functional History  Lives With: Alone  Type of Home: Apartment  Home Access: Level entry  Home Equipment: Rolling walkerBen  ADL Assistance: Independent  Homemaking Assistance: Independent  Ambulation Assistance: Independent  Transfer Assistance: Independent  Active : Yes  Mode of Transportation: Car  Occupation: Retired ( - for [employer] x 30 yrs. )  Additional Comments: RECENTLY. ... Pt reports some help from grandkids/ and son & his family. Help with cleaning and brings some food at times/ takes her shopping sometimes. Objective     Observation/Palpation  Posture: Fair (forward head. )  Observation: Pt with instability with standing. 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  L Hip Flexion: 3/5  L Hip Extension: 3/5  L Knee Flexion: 3/5  L Knee Extension: 3/5  L Ankle Dorsiflexion: 3/5  L Ankle Plantar Flexion: 3/5     Sensation  Overall Sensation Status: Impaired (franklin hands - numbness )  Bed mobility  Bridging: Stand by assistance  Rolling to Left: Stand by assistance  Rolling to Right: Stand by assistance  Supine to Sit: Contact guard assistance  Sit to Supine: Contact guard assistance  Scooting: Contact guard assistance  Transfers  Sit to Stand: Minimal Assistance  Stand to sit: Minimal Assistance  Bed to Chair: Minimal assistance  Stand Pivot Transfers: Minimal Assistance  Ambulation  Ambulation?: Yes  Ambulation 1  Surface: level tile  Device: Rolling Walker  Assistance:  Moderate assistance  Quality of Gait: Gait very unstable; lateral loss of balance with heel raises, pt unable to walk any further due to franklin LE weakness - buckling. Distance: 15 ft x 1;  2 ft x 2. Attempted 2nd distance but patient unable to complete. Balance  Posture: Fair  Sitting - Static: Good  Sitting - Dynamic: Good  Standing - Static: Fair  Standing - Dynamic: Fair;-  Comments: Pt with heavy reliance on UE's for stability but continues to loose balance even with r.walker. Exercises  Comments: Toilet transfer min assist +1;  AROM franklin LE x 10 reps;  deep breathing ex's x 10reps. Assessment   Body structures, Functions, Activity limitations: Decreased functional mobility ; Decreased strength;Decreased balance;Decreased endurance  Assessment: Pt presents with generalized weakness, post fall and decreased functional mobility. Pt is very motivated and was living independent at home - pt goal is to get back home asap. Pt is appropriate for short term SNF at d/c. Prognosis: Excellent  Decision Making: Medium Complexity  Clinical Presentation: evolving. Patient Education: Safety, Exercises, and PT POC  REQUIRES PT FOLLOW UP: Yes  Activity Tolerance  Activity Tolerance: Patient Tolerated treatment well     Discharge Recommendations:  [address]  Times per week: 1-2x/day and 5-6x/ week. Current Treatment Recommendations: Strengthening, Balance Training, Functional Mobility Training, Transfer Training, Safety Education & Training, Positioning, Home Exercise Program, Endurance Training, Patient/Caregiver Education & Training, Gait Training, Stair training  Safety Devices  Type of devices:  All fall risk precautions in place, Call light within reach, Patient at risk for falls, Left in bed                                        Goals  Short term goals  Time Frame for Short term goals: 12 tx's  Short term goal 1: Transfers independent  Short term goal 2: Amb x 200 ft with r.walker independent   Short term goal 3: Static standing balance min assist +1 without r.walker  Short term goal 4: Dynamic balance min assist +1 with r.walker   Short term goal 5: Pt able to tolerate 45 minutes of ther exercise & ther activity. Patient Goals   Patient goals : Pt goal is to increase strength to get back home.       Therapy Time   Individual   Time In 1436   Time Out 1528   Minutes 46      JOAN TOMLINSON, PT

## 2017-10-02 NOTE — H&P
claudication and remains on Eliquis. Her INR is elevated at 4.8, she is only in L [facility] Marking and not Coumadin. She has exhibited some urinary retention since admission ×2 and will continue to follow closely. We will start her on Flomax and if her symptoms do not improve, I will consult urology. Past Medical History:     Past Medical History:   Diagnosis Date    Atrial fibrillation ([address]     CAD (coronary artery disease)     CHF (congestive heart failure) (HCC)     COPD (chronic obstructive pulmonary disease) (HCC)     Esophageal reflux     Other and unspecified hyperlipidemia     Other primary cardiomyopathies     Type II or unspecified type diabetes mellitus without mention of complication, not stated as uncontrolled     Unspecified asthma     Unspecified essential hypertension         Past Surgical History:     Past Surgical History:   Procedure Laterality Date    CORONARY ARTERY BYPASS GRAFT      PACEMAKER PLACEMENT          Medications Prior to Admission:     Prior to Admission medications    Medication Sig Start Date End Date Taking?  Authorizing Provider   budesonide-formoterol (SYMBICORT) 160-4.5 MCG/ACT AERO Inhale 2 puffs into the lungs 2 times daily   Yes Historical Provider, MD   carvedilol (COREG) 6.25 MG tablet TAKE (1) TABLET EVERY MORNING AND EVENING 12/7/16  Yes Bee Dang MD   acetaminophen-codeine (TYLENOL #3) 300-30 MG per tablet  8/29/16  Yes Historical Provider, MD   fluticasone (FLONASE) 50 MCG/ACT nasal spray 1 spray by Nasal route daily   Yes Historical Provider, MD   ELIQUIS 5 MG TABS tablet TAKE (1) TABLET EVERY MORNING AND EVENING 3/30/16  Yes Bee Dang MD   Calcium Carbonate-Vitamin D (CALCIUM + D PO) Take by mouth   Yes Historical Provider, MD   Cholecalciferol (VITAMIN D3) 2000 UNITS CAPS Take by mouth   Yes Historical Provider, MD   mometasone-formoterol (DULERA) 200-5 MCG/ACT inhaler Inhale 2 puffs into the lungs every 12 hours    Historical Provider, MD ketoconazole (NIZORAL) 2 % cream  7/10/16   Historical Provider, MD   omeprazole (Levora Chant) 20 MG delayed release capsule  8/23/16   Historical Provider, MD   pravastatin (PRAVACHOL) 80 MG tablet  8/23/16   Historical Provider, MD   spironolactone (ALDACTONE) 25 MG tablet  8/23/16   Historical Provider, MD   docusate sodium (COLACE) 100 MG capsule Take 100 mg by mouth as needed for Constipation    Historical Provider, MD        Allergies:     Ambien [zolpidem tartrate]; Amoxicillin; and Metformin and related    Social History:     Tobacco:    reports that she has never smoked. She has never used smokeless tobacco.  Alcohol:      reports that she does not drink alcohol. Drug Use:  reports that she does not use illicit drugs. Family History:     Family History   Problem Relation Age of Onset    Family history unknown: Yes       Review of Systems:     Positive and Negative as described in HPI. CONSTITUTIONAL:  negative for fevers, chills, sweats,+ fatigue, no weight loss  HEENT:  negative for vision, hearing changes, runny nose, throat pain  RESPIRATORY:  negative for shortness of breath, cough, congestion, wheezing. CARDIOVASCULAR:  negative for chest pain, palpitations.   GASTROINTESTINAL:  negative for nausea, vomiting, diarrhea, constipation, change in bowel habits, abdominal pain   GENITOURINARY:  negative for difficulty of urination, burning with urination, frequency   INTEGUMENT:  negative for rash, skin lesions, easy bruising   HEMATOLOGIC/LYMPHATIC:  negative for swelling/edema   ALLERGIC/IMMUNOLOGIC:  negative for urticaria , itching  ENDOCRINE:  negative increase in drinking, increase in urination, hot or cold intolerance  MUSCULOSKELETAL:  negative joint pains, muscle aches, swelling of joints  NEUROLOGICAL:  negative for headaches, dizziness, lightheadedness, numbness, pain, tingling extremities; + for increased difficultly with walking and unsteady gait- abrupt onset per patient  BEHAVIOR/PSYCH: negative for depression, anxiety    Physical Exam:   /75  Pulse 75  Temp 97.9 °F (36.6 °C) (Oral)   Resp 16  Ht 5' 3\" (1.6 m)  Wt 161 lb 9.6 oz (73.3 kg)  SpO2 98%  BMI 28.63 kg/m2  Temp (24hrs), Av.8 °F (36.6 °C), Min:97.5 °F (36.4 °C), Max:98.1 °F (36.7 °C)    Recent Labs      10/02/17   1206   POCGLU  233*       Intake/Output Summary (Last 24 hours) at 10/02/17 1603  Last data filed at 10/02/17 1152   Gross per 24 hour   Intake              841 ml   Output              675 ml   Net              166 ml       General Appearance:  alert, well appearing, and in no acute distress  Mental status: oriented to person, place, and time with normal affect  Head:  normocephalic, atraumatic. Eye: no icterus, redness, pupils equal and reactive, extraocular eye movements intact, conjunctiva clear  Ear: normal external ear, no discharge, hearing intact  Nose:  no drainage noted  Mouth: mucous membranes moist  Neck: supple, no carotid bruits, thyroid not palpable  Lungs: Bilateral equal air entry, clear to ausculation, no wheezing, rales or rhonchi, normal effort  Cardiovascular: normal rate, regular rhythm, no murmur, gallop, rub. Abdomen: Soft, nontender, nondistended, normal bowel sounds, no hepatomegaly or splenomegaly  Neurologic: Generalized weakness noted throughout , decreased muscle tone and bulk, no abnormal sensation, normal speech, cranial nerves II through XII grossly intact; he is unsteady on her feet and has an unsteady gait per nursing. During my exam she was in the bed. She was able to answer questions appropriately.   Skin: No gross lesions, rashes, bruising or bleeding on exposed skin area  Extremities:  peripheral pulses palpable, no pedal edema or calf pain with palpation  Psych: normal affect       Investigations:      Laboratory Testing:  Recent Results (from the past 24 hour(s))   CBC Auto Differential    Collection Time: 10/01/17  4:38 PM   Result Value Ref Range    WBC 6.1 3.5 - 11.0 k/uL    RBC 4.29 4.0 - 5.2 m/uL    Hemoglobin 13.2 12.0 - 16.0 g/dL    Hematocrit 39.2 36 - 46 %    MCV 91.4 80 - 100 fL    MCH 30.9 26 - 34 pg    MCHC 33.8 31 - 37 g/dL    RDW 13.5 12.5 - 15.4 %    Platelets 678 634 - 468 k/uL    MPV 7.9 6.0 - 12.0 fL    Differential Type NOT REPORTED     Seg Neutrophils 61 %    Lymphocytes 25 %    Monocytes 11 %    Eosinophils % 2 %    Basophils 1 %    Segs Absolute 3.70 1.8 - 7.7 k/uL    Absolute Lymph # 1.50 1.0 - 4.8 k/uL    Absolute Mono # 0.60 0.1 - 1.2 k/uL    Absolute Eos # 0.10 0.0 - 0.4 k/uL    Basophils # 0.10 0.0 - 0.2 k/uL    WBC Morphology NOT REPORTED     RBC Morphology NOT REPORTED     Platelet Estimate NOT REPORTED    Basic Metabolic Panel    Collection Time: 10/01/17  4:38 PM   Result Value Ref Range    Glucose 116 (H) 70 - 99 mg/dL    BUN 20 8 - 23 mg/dL    CREATININE 0.80 0.50 - 0.90 mg/dL    Bun/Cre Ratio NOT REPORTED 9 - 20    Calcium 9.8 8.6 - 10.4 mg/dL    Sodium 139 135 - 144 mmol/L    Potassium 4.3 3.7 - 5.3 mmol/L    Chloride 97 (L) 98 - 107 mmol/L    CO2 29 20 - 31 mmol/L    Anion Gap 13 9 - 17 mmol/L    GFR Non-African American >60 >60 mL/min    GFR African American >60 >60 mL/min    GFR Comment          GFR Staging NOT REPORTED    Protime-INR    Collection Time: 10/01/17  4:38 PM   Result Value Ref Range    Protime 11.3 9.4 - 12.6 sec    INR 1.1    APTT    Collection Time: 10/01/17  4:38 PM   Result Value Ref Range    PTT 28.8 21.3 - 31.3 sec   Troponin    Collection Time: 10/01/17  4:38 PM   Result Value Ref Range    Troponin T <0.03 <0.03 ng/mL    Troponin Interp         EKG 12 Lead    Collection Time: 10/01/17  4:41 PM   Result Value Ref Range    Ventricular Rate 71 BPM    Atrial Rate 75 BPM    QRS Duration 84 ms    Q-T Interval 368 ms    QTc Calculation (Marelytt) 399 ms    R Axis 12 degrees    T Axis -98 degrees   UA W/REFLEX CULTURE    Collection Time: 10/01/17  4:50 PM   Result Value Ref Range    Color, UA YELLOW YEL    Turbidity UA SLIGHTLY 3.5 - 5.2 g/dL    Albumin/Globulin Ratio NOT REPORTED 1.0 - 2.5    GFR Non-African American >60 >60 mL/min    GFR African American >60 >60 mL/min    GFR Comment          GFR Staging NOT REPORTED    CBC auto differential    Collection Time: 10/02/17  5:56 AM   Result Value Ref Range    WBC 7.1 3.5 - 11.0 k/uL    RBC 3.67 (L) 4.0 - 5.2 m/uL    Hemoglobin 11.5 (L) 12.0 - 16.0 g/dL    Hematocrit 33.7 (L) 36 - 46 %    MCV 91.9 80 - 100 fL    MCH 31.2 26 - 34 pg    MCHC 34.0 31 - 37 g/dL    RDW 13.2 11.5 - 14.5 %    Platelets 850 623 - 956 k/uL    MPV NOT REPORTED 6.0 - 12.0 fL    Differential Type NOT REPORTED     WBC Morphology NOT REPORTED     RBC Morphology NOT REPORTED     Platelet Estimate NOT REPORTED     Seg Neutrophils 56 %    Lymphocytes 28 %    Monocytes 13 %    Eosinophils % 2 %    Basophils 1 %    Segs Absolute 4.00 1.8 - 7.7 k/uL    Absolute Lymph # 2.00 1.0 - 4.8 k/uL    Absolute Mono # 0.90 (H) 0.2 - 0.8 k/uL    Absolute Eos # 0.10 0.0 - 0.4 k/uL    Basophils # 0.10 0.0 - 0.2 k/uL   Protime-INR    Collection Time: 10/02/17  5:56 AM   Result Value Ref Range    Protime 52.5 (H) 9.7 - 11.6 sec    INR 4.8 (HH)    POC Glucose Fingerstick    Collection Time: 10/02/17 12:06 PM   Result Value Ref Range    POC Glucose 233 (H) 65 - 105 mg/dL       Imaging/Diagnostics:  Narrative   EXAMINATION:   CT OF THE CHEST WITH CONTRAST, 10/1/2017 6:18 pm       TECHNIQUE:   CT of the chest was performed with the administration of intravenous   contrast. Multiplanar reformatted images are provided for review.  Dose   modulation, iterative reconstruction, and/or weight based adjustment of the   mA/kV was utilized to reduce the radiation dose to as low as reasonably   achievable.       COMPARISON:   None       HISTORY:   ORDERING SYSTEM PROVIDED HISTORY: possible mass on xray   TECHNOLOGIST PROVIDED HISTORY:   Ordering Physician Provided Reason for Exam: Possibly mass found on X-ray   chest. Patient c/o mild SOB/ fatigue and frequent falls   Acuity: Acute   Type of Exam: Initial       FINDINGS:   Mediastinum: Heart is enlarged.  Status post CABG.  Thoracic aorta is   unremarkable.  No enlarged mediastinal lymph nodes.       Lungs/pleura: No pneumothorax.  No pleural effusion.  No focal consolidation. Central airways are patent.       Upper Abdomen: Cholecystectomy.  Otherwise, within normal limits.       Soft Tissues/Bones: No acute finding.           Impression   1. No focal airspace disease.  No pulmonary mass identified. 2. Cardiomegaly. EXAMINATION:   CT OF THE HEAD WITHOUT CONTRAST  10/1/2017 5:14 pm       TECHNIQUE:   CT of the head was performed without the administration of intravenous   contrast. Dose modulation, iterative reconstruction, and/or weight based   adjustment of the mA/kV was utilized to reduce the radiation dose to as low   as reasonably achievable.       COMPARISON:   None.       HISTORY:   ORDERING SYSTEM PROVIDED HISTORY: multiple falls off balance   TECHNOLOGIST PROVIDED HISTORY:   Ordering Physician Provided Reason for Exam: pt c/o fatigue and frequent   falls without injury   Acuity: Acute   Type of Exam: Initial       FINDINGS:   BRAIN/VENTRICLES: There is no acute intracranial hemorrhage, mass effect or   midline shift.  No abnormal extra-axial fluid collection.  The gray-white   differentiation is maintained without evidence of an acute infarct.    Encephalomalacia along the anterior right centrum semi ovale suggesting   remote infarct.  There is no evidence of hydrocephalus.       ORBITS: The visualized portion of the orbits demonstrate no acute abnormality.       SINUSES: Moderate mucosal thickening of the left sphenoid sinus and mild   mucosal thickening within the right frontal sinus suggesting chronic   sinusitis.       SOFT TISSUES/SKULL:  No acute abnormality of the visualized skull or soft   tissues.           Impression   No acute intracranial abnormality with chronic findings as described Narrative   EXAMINATION:   SINGLE VIEW OF THE CHEST       10/1/2017 5:01 pm       COMPARISON:   None.       HISTORY:   ORDERING SYSTEM PROVIDED HISTORY: falls   TECHNOLOGIST PROVIDED HISTORY:   Reason for exam:->falls   Ordering Physician Provided Reason for Exam: pt c/o fatigue   Acuity: Acute   Type of Exam: Initial   Additional signs and symptoms: mild sob       FINDINGS:   There is a bipolar pacer on the left.  Sternotomy wires are noted.  There is   mild cardiomegaly.  There is fullness of the superior mediastinum on the   right.  The bony thorax is intact.           Impression   Possible superior mediastinal mass.  Recommend follow-up CT chest with IV   contrast.     7/3/2017  Result Narrative   · Normal left ventricular ejection fraction. · The mitral valve leaflets appear to be thickened  · Mild mitral regurgitation. · Left atrium is normal in size. · The aortic valve is sclerotic but opens well. · Normal right ventricular systolic function. · The tricuspid valve leaflets are thickened. · Mild tricuspid valve regurgitation. · Right atrium is normal in size. · Pulmonic valve is thickened. · No pericardial effusion. Assessment :      Primary Problem  Ataxia    Active Hospital Problems    Diagnosis Date Noted    Ataxia [R27.0] 10/02/2017    Lethargy [R53.83] 10/02/2017    Fall at home [I63. 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

## 2017-10-02 NOTE — CONSULTS
Torrey Jalloh      Name: Margie Herr  MRN: [id number]     Acct: [de-identified]  Room: 2008/2008-02    Admit Date: 10/1/2017  PCP: Selma Penaloza MD    Physician Requesting Consult:  Ayana Hill NP    Reason for Consult:  Previous right arterial embolus, currently on Eliquis    Chief Complaint:     Chief Complaint   Patient presents with    Fatigue    Fall         History Obtained From:     patient, electronic medical record    History of Present Illness:      Margie Herr is a  80 y.o.  female who presents with Fatigue and Fall    In July she had an acute right lower extremity arterial embolus and underwent a right femoral-popliteal embolectomy by Dr. Vandana Powell. Postoperatively she was started on Eliquis. On questioning her she denies any amaurosis fugax, lateralizing symptoms or claudication. She does have chronic atrial fibrillation. Past Medical History:     Past Medical History:   Diagnosis Date    Atrial fibrillation ([facility] [address]     CAD (coronary artery disease)     CHF (congestive heart failure) (HCC)     COPD (chronic obstructive pulmonary disease) (HCC)     Esophageal reflux     Other and unspecified hyperlipidemia     Other primary cardiomyopathies     Type II or unspecified type diabetes mellitus without mention of complication, not stated as uncontrolled     Unspecified asthma     Unspecified essential hypertension         Past Surgical History:     Past Surgical History:   Procedure Laterality Date    CORONARY ARTERY BYPASS GRAFT      PACEMAKER PLACEMENT          Medications Prior to Admission:       Prior to Admission medications    Medication Sig Start Date End Date Taking?  Authorizing Provider   budesonide-formoterol (SYMBICORT) 160-4.5 MCG/ACT AERO Inhale 2 puffs into the lungs 2 times daily   Yes Historical Provider, MD   carvedilol (COREG) 6.25 MG tablet TAKE (1) TABLET EVERY MORNING AND EVENING 12/7/16  Yes Julius Marinelli MD

## 2017-10-02 NOTE — ED NOTES
Teresa Claudio speaking with Dr. Adarsh Molina about admission.       Edenilson Mendoza RN  10/01/17 2051

## 2017-10-02 NOTE — ED NOTES
Pt report called to [facility], RN at [address] [facility], RN  10/01/17 5871

## 2017-10-02 NOTE — PROGRESS NOTES
Writer observed patient calling out to use the bedside commode X2 with no results since patient arrived to the floor. Robbir bladder scanned patient with a result of 582 ml. Robbir obtained an order to straight cath one time. Robbir performed straight cath on patient with a result of 475 ml.  will continue to monitor.

## 2017-10-03 LAB
ABSOLUTE EOS #: 0.1 K/UL (ref 0–0.4)
ABSOLUTE LYMPH #: 1.9 K/UL (ref 1–4.8)
ABSOLUTE MONO #: 0.8 K/UL (ref 0.2–0.8)
ANION GAP SERPL CALCULATED.3IONS-SCNC: 11 MMOL/L (ref 9–17)
BASOPHILS # BLD: 1 %
BASOPHILS ABSOLUTE: 0.1 K/UL (ref 0–0.2)
BUN BLDV-MCNC: 13 MG/DL (ref 8–23)
BUN/CREAT BLD: 15 (ref 9–20)
CALCIUM SERPL-MCNC: 9.2 MG/DL (ref 8.6–10.4)
CHLORIDE BLD-SCNC: 103 MMOL/L (ref 98–107)
CO2: 28 MMOL/L (ref 20–31)
CREAT SERPL-MCNC: 0.85 MG/DL (ref 0.5–0.9)
DIFFERENTIAL TYPE: ABNORMAL
EOSINOPHILS RELATIVE PERCENT: 2 %
GFR AFRICAN AMERICAN: >60 ML/MIN
GFR NON-AFRICAN AMERICAN: >60 ML/MIN
GFR SERPL CREATININE-BSD FRML MDRD: ABNORMAL ML/MIN/{1.73_M2}
GFR SERPL CREATININE-BSD FRML MDRD: ABNORMAL ML/MIN/{1.73_M2}
GLUCOSE BLD-MCNC: 112 MG/DL (ref 65–105)
GLUCOSE BLD-MCNC: 120 MG/DL (ref 70–99)
GLUCOSE BLD-MCNC: 126 MG/DL (ref 65–105)
GLUCOSE BLD-MCNC: 175 MG/DL (ref 65–105)
GLUCOSE BLD-MCNC: 86 MG/DL (ref 65–105)
HCT VFR BLD CALC: 33.8 % (ref 36–46)
HEMOGLOBIN: 11.2 G/DL (ref 12–16)
LYMPHOCYTES # BLD: 29 %
MAGNESIUM: 1.7 MG/DL (ref 1.6–2.6)
MCH RBC QN AUTO: 30.8 PG (ref 26–34)
MCHC RBC AUTO-ENTMCNC: 33.2 G/DL (ref 31–37)
MCV RBC AUTO: 92.7 FL (ref 80–100)
MONOCYTES # BLD: 11 %
MYOGLOBIN: 38 NG/ML (ref 25–58)
MYOGLOBIN: 43 NG/ML (ref 25–58)
PDW BLD-RTO: 14 % (ref 11.5–14.5)
PLATELET # BLD: 209 K/UL (ref 130–400)
PLATELET ESTIMATE: ABNORMAL
PMV BLD AUTO: 7.8 FL (ref 6–12)
POTASSIUM SERPL-SCNC: 4.6 MMOL/L (ref 3.7–5.3)
RBC # BLD: 3.65 M/UL (ref 4–5.2)
RBC # BLD: ABNORMAL 10*6/UL
SEG NEUTROPHILS: 57 %
SEGMENTED NEUTROPHILS ABSOLUTE COUNT: 3.9 K/UL (ref 1.8–7.7)
SODIUM BLD-SCNC: 142 MMOL/L (ref 135–144)
TROPONIN INTERP: NORMAL
TROPONIN INTERP: NORMAL
TROPONIN T: <0.03 NG/ML
TROPONIN T: <0.03 NG/ML
WBC # BLD: 6.8 K/UL (ref 3.5–11)
WBC # BLD: ABNORMAL 10*3/UL

## 2017-10-03 PROCEDURE — 82947 ASSAY GLUCOSE BLOOD QUANT: CPT

## 2017-10-03 PROCEDURE — 93880 EXTRACRANIAL BILAT STUDY: CPT

## 2017-10-03 PROCEDURE — 6370000000 HC RX 637 (ALT 250 FOR IP): Performed by: NURSE PRACTITIONER

## 2017-10-03 PROCEDURE — 83735 ASSAY OF MAGNESIUM: CPT

## 2017-10-03 PROCEDURE — 2580000003 HC RX 258: Performed by: NURSE PRACTITIONER

## 2017-10-03 PROCEDURE — 80048 BASIC METABOLIC PNL TOTAL CA: CPT

## 2017-10-03 PROCEDURE — G0378 HOSPITAL OBSERVATION PER HR: HCPCS

## 2017-10-03 PROCEDURE — 36415 COLL VENOUS BLD VENIPUNCTURE: CPT

## 2017-10-03 PROCEDURE — 1200000000 HC SEMI PRIVATE

## 2017-10-03 PROCEDURE — 83874 ASSAY OF MYOGLOBIN: CPT

## 2017-10-03 PROCEDURE — 97116 GAIT TRAINING THERAPY: CPT

## 2017-10-03 PROCEDURE — 97530 THERAPEUTIC ACTIVITIES: CPT

## 2017-10-03 PROCEDURE — 97110 THERAPEUTIC EXERCISES: CPT

## 2017-10-03 PROCEDURE — 84484 ASSAY OF TROPONIN QUANT: CPT

## 2017-10-03 PROCEDURE — 6360000002 HC RX W HCPCS: Performed by: NURSE PRACTITIONER

## 2017-10-03 PROCEDURE — 85025 COMPLETE CBC W/AUTO DIFF WBC: CPT

## 2017-10-03 PROCEDURE — 99225 PR SBSQ OBSERVATION CARE/DAY 25 MINUTES: CPT | Performed by: NURSE PRACTITIONER

## 2017-10-03 PROCEDURE — 95816 EEG AWAKE AND DROWSY: CPT

## 2017-10-03 PROCEDURE — 94760 N-INVAS EAR/PLS OXIMETRY 1: CPT

## 2017-10-03 PROCEDURE — 94640 AIRWAY INHALATION TREATMENT: CPT

## 2017-10-03 RX ADMIN — SODIUM CHLORIDE: 9 INJECTION, SOLUTION INTRAVENOUS at 00:24

## 2017-10-03 RX ADMIN — CARVEDILOL 6.25 MG: 6.25 TABLET, FILM COATED ORAL at 21:31

## 2017-10-03 RX ADMIN — MOMETASONE FUROATE AND FORMOTEROL FUMARATE DIHYDRATE 2 PUFF: 200; 5 AEROSOL RESPIRATORY (INHALATION) at 09:23

## 2017-10-03 RX ADMIN — LEVOFLOXACIN 250 MG: 5 INJECTION, SOLUTION INTRAVENOUS at 21:31

## 2017-10-03 RX ADMIN — APIXABAN 5 MG: 5 TABLET, FILM COATED ORAL at 11:09

## 2017-10-03 RX ADMIN — ACETAMINOPHEN AND CODEINE PHOSPHATE 2 TABLET: 300; 30 TABLET ORAL at 02:06

## 2017-10-03 RX ADMIN — MOMETASONE FUROATE AND FORMOTEROL FUMARATE DIHYDRATE 2 PUFF: 200; 5 AEROSOL RESPIRATORY (INHALATION) at 20:18

## 2017-10-03 RX ADMIN — ACETAMINOPHEN 650 MG: 325 TABLET ORAL at 23:18

## 2017-10-03 RX ADMIN — PRAVASTATIN SODIUM 80 MG: 40 TABLET ORAL at 21:31

## 2017-10-03 RX ADMIN — APIXABAN 5 MG: 5 TABLET, FILM COATED ORAL at 21:31

## 2017-10-03 RX ADMIN — SPIRONOLACTONE 25 MG: 25 TABLET, FILM COATED ORAL at 11:10

## 2017-10-03 RX ADMIN — GLIMEPIRIDE 2 MG: 2 TABLET ORAL at 11:10

## 2017-10-03 RX ADMIN — ACETAMINOPHEN AND CODEINE PHOSPHATE 2 TABLET: 300; 30 TABLET ORAL at 19:17

## 2017-10-03 RX ADMIN — CARVEDILOL 6.25 MG: 6.25 TABLET, FILM COATED ORAL at 11:09

## 2017-10-03 ASSESSMENT — PAIN DESCRIPTION - FREQUENCY
FREQUENCY: CONTINUOUS

## 2017-10-03 ASSESSMENT — PAIN DESCRIPTION - ONSET
ONSET: ON-GOING

## 2017-10-03 ASSESSMENT — PAIN SCALES - GENERAL
PAINLEVEL_OUTOF10: 0
PAINLEVEL_OUTOF10: 5
PAINLEVEL_OUTOF10: 5
PAINLEVEL_OUTOF10: 0
PAINLEVEL_OUTOF10: 3
PAINLEVEL_OUTOF10: 0
PAINLEVEL_OUTOF10: 0

## 2017-10-03 ASSESSMENT — PAIN DESCRIPTION - ORIENTATION
ORIENTATION: RIGHT;LEFT

## 2017-10-03 ASSESSMENT — PAIN DESCRIPTION - LOCATION
LOCATION: LEG

## 2017-10-03 ASSESSMENT — PAIN DESCRIPTION - DESCRIPTORS
DESCRIPTORS: ACHING
DESCRIPTORS: ACHING
DESCRIPTORS: ACHING;BURNING

## 2017-10-03 ASSESSMENT — PAIN DESCRIPTION - PAIN TYPE
TYPE: CHRONIC PAIN

## 2017-10-03 NOTE — PROGRESS NOTES
Recommendations:  Subacute/Skilled Nursing Facility         Goals  Short term goals  Time Frame for Short term goals: 12 tx's  Short term goal 1: Transfers independent  Short term goal 2: Amb x 200 ft with r.walker independent   Short term goal 3: Static standing balance min assist +1 without r.walker  Short term goal 4: Dynamic balance min assist +1 with r.walker   Short term goal 5: Pt able to tolerate 45 minutes of ther exercise & ther activity. Patient Goals   Patient goals : Pt goal is to increase strength to get back home. Plan    Plan  Times per week: 1-2x/day and 5-6x/ week. Current Treatment Recommendations: Strengthening, Balance Training, Functional Mobility Training, Transfer Training, Safety Education & Training, Positioning, Home Exercise Program, Endurance Training, Patient/Caregiver Education & Training, Gait Training, Stair training  Safety Devices  Type of devices:  All fall risk precautions in place, Call light within reach, Gait belt, Left in bed, Bed alarm in place, Nurse notified     Therapy Time   Individual Concurrent Group Co-treatment   Time In 1110         Time Out [address] [place], [place]

## 2017-10-03 NOTE — PROGRESS NOTES
Vascular Surgery   Progress Note    10/3/2017 3:49 PM  Subjective:   Admit Date: 10/1/2017  PCP: Steve Moser MD  Interval History: No complaints. Ataxia improved    Diet: DIET CARB CONTROL;    Medications:   Scheduled Meds:   glimepiride  2 mg Oral Daily with breakfast    mometasone-formoterol  2 puff Inhalation BID    carvedilol  6.25 mg Oral BID WC    apixaban  5 mg Oral BID    pravastatin  80 mg Oral Nightly    spironolactone  25 mg Oral Daily    sodium chloride flush  10 mL Intravenous 2 times per day    levofloxacin  250 mg Intravenous Q24H     Continuous Infusions:   sodium chloride 75 mL/hr at 10/03/17 0024         Labs:   CBC:   Recent Labs      10/01/17   1638  10/02/17   0556  10/03/17   0609   WBC  6.1  7.1  6.8   HGB  13.2  11.5*  11.2*   PLT  245  195  209     BMP:    Recent Labs      10/01/17   1638  10/02/17   0556  10/03/17   0609   NA  139  140  142   K  4.3  3.7  4.6   CL  97*  101  103   CO2  29  29  28   BUN  20  18  13   CREATININE  0.80  0.86  0.85   GLUCOSE  116*  115*  120*     Hepatic:   Recent Labs      10/02/17   0556   AST  16   ALT  11   BILITOT  0.22*   ALKPHOS  51     Troponin: Invalid input(s): TROPONIN  BNP: No results for input(s): BNP in the last 72 hours. Lipids: No results for input(s): CHOL, HDL in the last 72 hours.     Invalid input(s): LDLCALCU  INR:   Recent Labs      10/01/17   1638  10/02/17   0556   INR  1.1  4.8*       Objective:   Vitals: BP (!) 148/65  Pulse 81  Temp 97.9 °F (36.6 °C) (Oral)   Resp 18  Ht 5' 3\" (1.6 m)  Wt 161 lb 9.6 oz (73.3 kg)  SpO2 96%  BMI 28.63 kg/m2  General appearance: alert, cooperative and no distress  Mental Status: oriented to person, place and time with normal affect  Neck: good carotid pulses, no JVD  Lungs: clear to auscultation bilaterally, normal effort  Heart: regular rate and rhythm, no murmur,  Abdomen: soft, obese, non-tender, non-distended, bowel sounds present all four quadrants, no masses, hepatomegaly, splenomegaly or aortic enlargement  Extremities: no edema, erythema or tenderness in the calves  Skin: no gross lesions, rashes, or induration    Assessment:   1. Previous right femoral embolus with femoral popliteal embolectomy at [facility], July 2017  2. Chronic atrial fibrillation  3. Improving ataxia    Patient Active Problem List:     Atrial fibrillation (HCC)     Pacemaker     Hypertension     CAD (coronary artery disease)     Hyperlipidemia     Hx of CABG     H/O: CVA (cerebrovascular accident)     Varicose veins of left lower extremity     Mild mitral regurgitation     Bilateral leg edema     Anemia     Bilateral carotid artery stenosis     Ataxia     Lethargy     Fall at home     COPD (chronic obstructive pulmonary disease) ([facility] [address]     Esophageal reflux     HTN (hypertension), benign     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 ([facility] [address]     Acute cystitis without hematuria     Acute urinary retention      Plan:   1. Continue Eliquis  2.  Home when okay with all    Electronically signed by Juan Pablo Gutiérrez MD on 10/3/2017 at 3:49 PM

## 2017-10-03 NOTE — CONSULTS
10/3/17  Cardiology Consultation Note   Reason for Consult: We have been asked to evaluate this patient for PPM, Afib. Name:   Kaitlyn Izaguirre :  1932   MRN:   [id number] Gender:   female   PCP:  Eloy Dave MD Age: 80 y.o. PCP Fax:  [phone] Attending Physician: Zara Sifuentes, [address]: [facility] Number:      History Obtained From:  patient, electronic medical record    Recommendations:   · Neurology work-up: MRI, Carotids and EEG  · Continue current medications  · Continue telemetry monitoring  · Check mag level    Impression:   1. ? CVA vs. Seizure/ Falls/ Unsteady gait: new onset. Neurology following. MRI and EEG pending. 2. Atrial Fibrillation: Chronic atrial fibrillation with controlled ventricular rate and currently anticougulated. I plan to continue current medications. 3. UTI/Acute Cystitis: per primary. On Levaquin. 4. PPM:  MRI compatible. Medtronic device. 5. Valvular Heart Disease: MR/TR by previous echoes  6. Hypertension:  Blood pressure is currently borderline elevated. I plan to continue current medications. 7. Carotid Stenosis: Neurology and vascular following. Carotid studies order noted. 8. Coronary artery disease: Stable, with no symptoms. I plan to continue current medications/treatment. 9. Hyperlipidemia: On statin. Clinical Summary:    80 y. o. female who is currently sitting up in chair working with physical therapy. Well known patient last seen in office 17. Patient was admitted for acute onset fatigue with falls/unsteady gait on 10/1/17. CT brain negative and patient reports never losing consciousness at home. She was found to have positive UTI with cultures pending. Neurology and vascular have both been consulted. Neurology work-up includes pending MRI, carotids, and EEG to r/o seizure. Had extensive w/u at [facility] in July of this year, where she had RLE femoral and popliteal embolectomy and placed on Eliquis. Currently working with physical therapy in room. Using walker to assist with ambulation. Denies current CVA/TIA like symptoms, dizziness, or syncope. Admits to generalized weakness, worse on L side due to prior CVA per statement. States she had an episode of SOB during the night, where she woke up and felt like she couldn't breath. Otherwise, she denies any SOB or HADLEY at the present. Denies CP, palpitations, or edema. Denies epistaxis, hemoptysis, hematemesis, hematuria, hematochezia, or melena. Admits to having BLE pain, but when at home and walking more, the pain is not present. States pain is noticed more now due to not getting up much and ambulating. Per nursing staff: patient has been intermittently confused throughout the shift. When staff got to room with patient complaining of SOB, patient pulse ox was 100% and patient was in no acute distress. No longer having urinary retention- awaiting to bladder scan. History:   Code Status: Full Code     Allergies as of 10/01/2017 - Review Complete 10/01/2017   Allergen Reaction Noted    Ambien [zolpidem tartrate]  06/18/2015    Amoxicillin  06/18/2015    Metformin and related  06/18/2015     Prior to Admission medications    Medication Sig Start Date End Date Taking?  Authorizing Provider   budesonide-formoterol (SYMBICORT) 160-4.5 MCG/ACT AERO Inhale 2 puffs into the lungs 2 times daily   Yes Historical Provider, MD   carvedilol (COREG) 6.25 MG tablet TAKE (1) TABLET EVERY MORNING AND EVENING 12/7/16  Yes Nik Keita MD   acetaminophen-codeine (TYLENOL #3) 300-30 MG per tablet  8/29/16  Yes Historical Provider, MD   fluticasone (FLONASE) 50 MCG/ACT nasal spray 1 spray by Nasal route daily   Yes Historical Provider, MD   ELIQUIS 5 MG TABS tablet TAKE (1) TABLET EVERY MORNING AND EVENING 3/30/16  Yes Nik Keita MD   Calcium Carbonate-Vitamin D (CALCIUM + D PO) Take by mouth   Yes Historical Provider, MD   Cholecalciferol (VITAMIN D3) 2000 UNITS CAPS Take by mouth   Yes Historical Provider, MD   mometasone-formoterol [facility]) 200-5 MCG/ACT inhaler Inhale 2 puffs into the lungs every 12 hours    Historical Provider, MD   ketoconazole (NIZORAL) 2 % cream  7/10/16   Historical Provider, MD   omeprazole (Sreedhar July) 20 MG delayed release capsule  8/23/16   Historical Provider, MD   pravastatin (PRAVACHOL) 80 MG tablet  8/23/16   Historical Provider, MD   spironolactone (ALDACTONE) 25 MG tablet  8/23/16   Historical Provider, MD   docusate sodium (COLACE) 100 MG capsule Take 100 mg by mouth as needed for Constipation    Historical Provider, MD     She  has a past medical history of Atrial fibrillation ([facility] [address] CAD (coronary artery disease); CHF (congestive heart failure) ([address] COPD (chronic obstructive pulmonary disease) ([address] Esophageal reflux; Other and unspecified hyperlipidemia; Other primary cardiomyopathies; Type II or unspecified type diabetes mellitus without mention of complication, not stated as uncontrolled; Unspecified asthma; and Unspecified essential hypertension. She  has a past surgical history that includes Coronary artery bypass graft and pacemaker placement. 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 She has never used smokeless tobacco. She reports that she does not drink alcohol or use illicit drugs.     She has no family

## 2017-10-03 NOTE — PROGRESS NOTES
129*  130*  112*       I/O (24Hr):     Intake/Output Summary (Last 24 hours) at 10/03/17 1401  Last data filed at 10/03/17 0555   Gross per 24 hour   Intake             2446 ml   Output              300 ml   Net             2146 ml       Labs:    Hematology:  Recent Labs      10/01/17   1638  10/02/17   0556  10/03/17   0609   WBC  6.1  7.1  6.8   RBC  4.29  3.67*  3.65*   HGB  13.2  11.5*  11.2*   HCT  39.2  33.7*  33.8*   MCV  91.4  91.9  92.7   MCH  30.9  31.2  30.8   MCHC  33.8  34.0  33.2   RDW  13.5  13.2  14.0   PLT  245  195  209   MPV  7.9  NOT REPORTED  7.8   INR  1.1  4.8*   --      Chemistry:  Recent Labs      10/01/17   1638  10/02/17   0556  10/02/17   1817  10/02/17   2353  10/03/17   0609   NA  139  140   --    --   142   K  4.3  3.7   --    --   4.6   CL  97*  101   --    --   103   CO2  29  29   --    --   28   GLUCOSE  116*  115*   --    --   120*   BUN  20  18   --    --   13   CREATININE  0.80  0.86   --    --   0.85   MG   --    --    --    --   1.7   ANIONGAP  13  10   --    --   11   LABGLOM  >60  >60   --    --   >60   GFRAA  >60  >60   --    --   >60   CALCIUM  9.8  9.0   --    --   9.2   TROPONINT  <0.03   --   <0.03  <0.03  <0.03   MYOGLOBIN   --    --   42  43  38     Recent Labs      10/02/17   0556  10/02/17   1206  10/02/17   1628  10/02/17   2114  10/03/17   0620   PROT  6.3*   --    --    --    --    LABALBU  3.6   --    --    --    --    AST  16   --    --    --    --    ALT  11   --    --    --    --    ALKPHOS  51   --    --    --    --    BILITOT  0.22*   --    --    --    --    POCGLU   --   233*  129*  130*  112*       Lab Results   Component Value Date/Time    SPECIAL NOT REPORTED 10/01/2017 04:50 PM     Lab Results   Component Value Date/Time    CULTURE NO SIGNIFICANT GROWTH 10/01/2017 04:50 PM    CULTURE  10/01/2017 04:50 PM     Performed at [address] [place] [address], [address] [place] [address] [phone]         Radiology:      Narrative   EXAMINATION:   CT OF THE CHEST WITH CONTRAST, 10/1/2017 6:18 pm        TECHNIQUE:   CT of the chest was performed with the administration of intravenous   contrast. Multiplanar reformatted images are provided for review. Dose   modulation, iterative reconstruction, and/or weight based adjustment of the   mA/kV was utilized to reduce the radiation dose to as low as reasonably   achievable.        COMPARISON:   None        HISTORY:   ORDERING SYSTEM PROVIDED HISTORY: possible mass on xray   TECHNOLOGIST PROVIDED HISTORY:   Ordering Physician Provided Reason for Exam: Possibly mass found on X-ray   chest. Patient c/o mild SOB/ fatigue and frequent falls   Acuity: Acute   Type of Exam: Initial        FINDINGS:   Mediastinum: Heart is enlarged.  Status post CABG.  Thoracic aorta is   unremarkable.  No enlarged mediastinal lymph nodes.        Lungs/pleura: No pneumothorax.  No pleural effusion.  No focal consolidation. Central airways are patent.        Upper Abdomen: Cholecystectomy.  Otherwise, within normal limits.        Soft Tissues/Bones: No acute finding.             Impression   1. No focal airspace disease.  No pulmonary mass identified.    2. Cardiomegaly.      EXAMINATION:   CT OF THE HEAD WITHOUT CONTRAST  10/1/2017 5:14 pm        TECHNIQUE:   CT of the head was performed without the administration of intravenous   contrast. Dose modulation, iterative reconstruction, and/or weight based   adjustment of the mA/kV was utilized to reduce the radiation dose to as low   as reasonably achievable.        COMPARISON:   None.        HISTORY:   ORDERING SYSTEM PROVIDED HISTORY: multiple falls off balance   TECHNOLOGIST PROVIDED HISTORY:   Ordering Physician Provided Reason for Exam: pt c/o fatigue and frequent   falls without injury   Acuity: Acute   Type of Exam: Initial        FINDINGS:   BRAIN/VENTRICLES: There is no acute intracranial hemorrhage, mass effect or   midline shift.  No abnormal extra-axial fluid collection.  The gray-white differentiation is maintained without evidence of an acute infarct. Encephalomalacia along the anterior right centrum semi ovale suggesting   remote infarct.  There is no evidence of hydrocephalus.        ORBITS: The visualized portion of the orbits demonstrate no acute abnormality.        SINUSES: Moderate mucosal thickening of the left sphenoid sinus and mild   mucosal thickening within the right frontal sinus suggesting chronic   sinusitis.        SOFT TISSUES/SKULL:  No acute abnormality of the visualized skull or soft   tissues.           Impression   No acute intracranial abnormality with chronic findings as described      Narrative   EXAMINATION:   SINGLE VIEW OF THE CHEST        10/1/2017 5:01 pm        COMPARISON:   None.        HISTORY:   ORDERING SYSTEM PROVIDED HISTORY: falls   TECHNOLOGIST PROVIDED HISTORY:   Reason for exam:->falls   Ordering Physician Provided Reason for Exam: pt c/o fatigue   Acuity: Acute   Type of Exam: Initial   Additional signs and symptoms: mild sob        FINDINGS:   There is a bipolar pacer on the left.  Sternotomy wires are noted.  There is   mild cardiomegaly.  There is fullness of the superior mediastinum on the   right.  The bony thorax is intact.             Impression   Possible superior mediastinal mass.  Recommend follow-up CT chest with IV   contrast.      7/3/2017  Result Narrative   · Normal left ventricular ejection fraction. · The mitral valve leaflets appear to be thickened  · Mild mitral regurgitation. · Left atrium is normal in size. · The aortic valve is sclerotic but opens well. · Normal right ventricular systolic function. · The tricuspid valve leaflets are thickened. · Mild tricuspid valve regurgitation. · Right atrium is normal in size. · Pulmonic valve is thickened. · No pericardial effusion.          Physical Examination:        General appearance:  alert, cooperative and no distress  Mental Status:  oriented to person, place and time and

## 2017-10-04 ENCOUNTER — APPOINTMENT (OUTPATIENT)
Dept: CT IMAGING | Age: 82
DRG: 065 | End: 2017-10-04
Payer: MEDICARE

## 2017-10-04 ENCOUNTER — APPOINTMENT (OUTPATIENT)
Dept: MRI IMAGING | Age: 82
DRG: 065 | End: 2017-10-04
Payer: MEDICARE

## 2017-10-04 LAB
ANION GAP SERPL CALCULATED.3IONS-SCNC: 12 MMOL/L (ref 9–17)
BUN BLDV-MCNC: 13 MG/DL (ref 8–23)
BUN/CREAT BLD: 16 (ref 9–20)
CALCIUM SERPL-MCNC: 9.4 MG/DL (ref 8.6–10.4)
CHLORIDE BLD-SCNC: 104 MMOL/L (ref 98–107)
CO2: 26 MMOL/L (ref 20–31)
CREAT SERPL-MCNC: 0.79 MG/DL (ref 0.5–0.9)
GFR AFRICAN AMERICAN: >60 ML/MIN
GFR NON-AFRICAN AMERICAN: >60 ML/MIN
GFR SERPL CREATININE-BSD FRML MDRD: NORMAL ML/MIN/{1.73_M2}
GFR SERPL CREATININE-BSD FRML MDRD: NORMAL ML/MIN/{1.73_M2}
GLUCOSE BLD-MCNC: 149 MG/DL (ref 65–105)
GLUCOSE BLD-MCNC: 90 MG/DL (ref 65–105)
GLUCOSE BLD-MCNC: 94 MG/DL (ref 70–99)
POTASSIUM SERPL-SCNC: 3.9 MMOL/L (ref 3.7–5.3)
SODIUM BLD-SCNC: 142 MMOL/L (ref 135–144)

## 2017-10-04 PROCEDURE — 1200000000 HC SEMI PRIVATE

## 2017-10-04 PROCEDURE — G0378 HOSPITAL OBSERVATION PER HR: HCPCS

## 2017-10-04 PROCEDURE — 2500000003 HC RX 250 WO HCPCS: Performed by: NURSE PRACTITIONER

## 2017-10-04 PROCEDURE — 6370000000 HC RX 637 (ALT 250 FOR IP): Performed by: NURSE PRACTITIONER

## 2017-10-04 PROCEDURE — 94760 N-INVAS EAR/PLS OXIMETRY 1: CPT

## 2017-10-04 PROCEDURE — 36415 COLL VENOUS BLD VENIPUNCTURE: CPT

## 2017-10-04 PROCEDURE — 99225 PR SBSQ OBSERVATION CARE/DAY 25 MINUTES: CPT | Performed by: INTERNAL MEDICINE

## 2017-10-04 PROCEDURE — 97530 THERAPEUTIC ACTIVITIES: CPT

## 2017-10-04 PROCEDURE — 97116 GAIT TRAINING THERAPY: CPT

## 2017-10-04 PROCEDURE — 2580000003 HC RX 258: Performed by: NURSE PRACTITIONER

## 2017-10-04 PROCEDURE — 70498 CT ANGIOGRAPHY NECK: CPT

## 2017-10-04 PROCEDURE — 6360000002 HC RX W HCPCS: Performed by: NURSE PRACTITIONER

## 2017-10-04 PROCEDURE — 70551 MRI BRAIN STEM W/O DYE: CPT

## 2017-10-04 PROCEDURE — 82947 ASSAY GLUCOSE BLOOD QUANT: CPT

## 2017-10-04 PROCEDURE — 94640 AIRWAY INHALATION TREATMENT: CPT

## 2017-10-04 PROCEDURE — 97110 THERAPEUTIC EXERCISES: CPT

## 2017-10-04 PROCEDURE — 6360000004 HC RX CONTRAST MEDICATION: Performed by: PSYCHIATRY & NEUROLOGY

## 2017-10-04 PROCEDURE — 80048 BASIC METABOLIC PNL TOTAL CA: CPT

## 2017-10-04 RX ORDER — 0.9 % SODIUM CHLORIDE 0.9 %
50 INTRAVENOUS SOLUTION INTRAVENOUS ONCE
Status: DISCONTINUED | OUTPATIENT
Start: 2017-10-04 | End: 2017-10-05 | Stop reason: HOSPADM

## 2017-10-04 RX ORDER — METOPROLOL TARTRATE 5 MG/5ML
5 INJECTION INTRAVENOUS EVERY 4 HOURS PRN
Status: DISCONTINUED | OUTPATIENT
Start: 2017-10-04 | End: 2017-10-05 | Stop reason: HOSPADM

## 2017-10-04 RX ORDER — SODIUM CHLORIDE 0.9 % (FLUSH) 0.9 %
10 SYRINGE (ML) INJECTION 2 TIMES DAILY
Status: DISCONTINUED | OUTPATIENT
Start: 2017-10-04 | End: 2017-10-05 | Stop reason: HOSPADM

## 2017-10-04 RX ADMIN — ACETAMINOPHEN 650 MG: 325 TABLET ORAL at 22:35

## 2017-10-04 RX ADMIN — Medication 10 ML: at 20:03

## 2017-10-04 RX ADMIN — APIXABAN 5 MG: 5 TABLET, FILM COATED ORAL at 08:28

## 2017-10-04 RX ADMIN — IOPAMIDOL 80 ML: 755 INJECTION, SOLUTION INTRAVENOUS at 20:02

## 2017-10-04 RX ADMIN — PRAVASTATIN SODIUM 80 MG: 40 TABLET ORAL at 22:34

## 2017-10-04 RX ADMIN — APIXABAN 5 MG: 5 TABLET, FILM COATED ORAL at 22:34

## 2017-10-04 RX ADMIN — METOPROLOL TARTRATE 5 MG: 5 INJECTION INTRAVENOUS at 22:35

## 2017-10-04 RX ADMIN — SPIRONOLACTONE 25 MG: 25 TABLET, FILM COATED ORAL at 08:28

## 2017-10-04 RX ADMIN — SODIUM CHLORIDE: 9 INJECTION, SOLUTION INTRAVENOUS at 04:13

## 2017-10-04 RX ADMIN — CARVEDILOL 6.25 MG: 6.25 TABLET, FILM COATED ORAL at 16:59

## 2017-10-04 RX ADMIN — GLIMEPIRIDE 2 MG: 2 TABLET ORAL at 08:28

## 2017-10-04 RX ADMIN — LEVOFLOXACIN 250 MG: 5 INJECTION, SOLUTION INTRAVENOUS at 22:33

## 2017-10-04 RX ADMIN — Medication 10 ML: at 22:35

## 2017-10-04 RX ADMIN — MOMETASONE FUROATE AND FORMOTEROL FUMARATE DIHYDRATE 2 PUFF: 200; 5 AEROSOL RESPIRATORY (INHALATION) at 21:34

## 2017-10-04 RX ADMIN — CARVEDILOL 6.25 MG: 6.25 TABLET, FILM COATED ORAL at 08:28

## 2017-10-04 RX ADMIN — ACETAMINOPHEN 650 MG: 325 TABLET ORAL at 08:28

## 2017-10-04 RX ADMIN — MOMETASONE FUROATE AND FORMOTEROL FUMARATE DIHYDRATE 2 PUFF: 200; 5 AEROSOL RESPIRATORY (INHALATION) at 09:12

## 2017-10-04 ASSESSMENT — PAIN SCALES - GENERAL
PAINLEVEL_OUTOF10: 0
PAINLEVEL_OUTOF10: 5
PAINLEVEL_OUTOF10: 3
PAINLEVEL_OUTOF10: 0
PAINLEVEL_OUTOF10: 0

## 2017-10-04 NOTE — PROGRESS NOTES
Patient seen at this time. Sitting in chair watching TV in no acute distress. Feeling \"much better. \" No longer having any SOB. Denies CP, palpitations, SOB, HADLEY. Denies fatigue. No longer having BLE pain. No longer having confusion. Looking forward to discharge and rehab. Alert, oriented. Appropriate. Neck supple. Atraumatic. Lung sounds clear throughout, diminished. Regular rate. S1S2. Irregularly irregular. 1/6 soft systolic murmur noted. Distant sound. PPP. Trace B ankle edema  Bowel sounds active. Abdomen soft, non-tender, non-distended. Skin intact. Vital signs reviewed. ECG reviewed- Paced. Chronic afib. Labs reviewed. 10/3/17 Carotids:   25% right internal carotid artery stenosis.    45-50 % left internal carotid artery stenosis.    Patent vertebral arteries with antegrade flow. Discharge planning noted. Fall/Fatigue/Ataxia: Fatigue and ataxia resolved per statement. Still awaiting MRI. Hypertension:  Blood pressure is currently borderline elevated. I plan to continue current medications. Atrial Fibrillation: Chonic atrial fibrillation with controlled ventricular rate and currently anticougulated. I plan to continue current medications. PPM- Sekoiatronic  UTI: Per primary. On Levaquin  Carotid Stenosis  Coronary artery disease: Stable, with no symptoms. I plan to continue current medications/treatment. Okay to discharge patient from cardiology standpoint at this time. Please have patient f/u in office in 7-10 days. Thank you. Sawyer Zarate CNP        [address] [facility]  [facility] [place] [address]  Tel:  [phone]      Fax:  [phone]  www. [facility]

## 2017-10-04 NOTE — PLAN OF CARE
Problem: Falls - Risk of  Goal: Absence of falls  Outcome: Ongoing    Problem: Pain:  Goal: Pain level will decrease  Pain level will decrease   Outcome: Ongoing  Goal: Control of chronic pain  Control of chronic pain   Outcome: Ongoing    Problem: ABCDS Injury Assessment  Goal: Absence of physical injury  Outcome: Ongoing    Problem: Serum Glucose Level - Abnormal:  Goal: Ability to maintain appropriate glucose levels will improve  Ability to maintain appropriate glucose levels will improve   Outcome: Ongoing    Problem: Bleeding:  Goal: Will show no signs and symptoms of excessive bleeding  Will show no signs and symptoms of excessive bleeding   Outcome: Ongoing

## 2017-10-04 NOTE — PLAN OF CARE
Problem: Falls - Risk of  Goal: Absence of falls  Outcome: Ongoing  Siderails up x 2  Hourly rounding. Call light in reach. Instructed to call for assist before attempting out of bed. Remains free from falls and accidental injury at this time. Floor free from obstacles, and bed is locked and in lowest position. Adequate lighting provided. Problem: Pain:  Goal: Pain level will decrease  Pain level will decrease   Outcome: Ongoing  Pain level assessment complete. Pt educated on pain scale and control interventions. PRN pain medication given per pt request.   Pt instructed to call out with new onset of pain or unrelieved pain.

## 2017-10-04 NOTE — PROGRESS NOTES
abdominal pain, blood in stool, constipation, diarrhea, heartburn, melena, nausea and vomiting. Genitourinary: Negative for dysuria, flank pain, frequency, hematuria and urgency. Musculoskeletal: Negative for back pain, falls, joint pain, myalgias and neck pain. Positive for generalized weakness lower extremity. Skin: Negative for itching and rash. Neurological: Negative for dizziness, tingling, tremors, sensory change, focal weakness, seizures, weakness and headaches. Endo/Heme/Allergies: Does not bruise/bleed easily. Psychiatric/Behavioral: Negative for depression. The patient is not nervous/anxious. Medications: Allergies:    Allergies   Allergen Reactions    Ambien [Zolpidem Tartrate]     Amoxicillin     Metformin And Related        Current Meds:   acetaminophen-codeine (TYLENOL #3) 300-30 MG per tablet 2 tablet Q8H PRN   glimepiride (AMARYL) tablet 2 mg Daily with breakfast   mometasone-formoterol (DULERA) 200-5 MCG/ACT inhaler 2 puff BID   carvedilol (COREG) tablet 6.25 mg BID WC   apixaban (ELIQUIS) tablet 5 mg BID   pravastatin (PRAVACHOL) tablet 80 mg Nightly   spironolactone (ALDACTONE) tablet 25 mg Daily   sodium chloride flush 0.9 % injection 10 mL 2 times per day   sodium chloride flush 0.9 % injection 10 mL PRN   acetaminophen (TYLENOL) tablet 650 mg Q4H PRN   magnesium hydroxide (MILK OF MAGNESIA) 400 MG/5ML suspension 30 mL Daily PRN   ondansetron (ZOFRAN) injection 4 mg Q6H PRN   0.9 % sodium chloride infusion Continuous   levofloxacin (LEVAQUIN) 250 MG/50ML infusion 250 mg Q24H       Data:     Code Status:  Full Code    Labs:    Hematology:  Recent Labs      10/01/17   1638  10/02/17   0556  10/03/17   0609   WBC  6.1  7.1  6.8   RBC  4.29  3.67*  3.65*   HGB  13.2  11.5*  11.2*   HCT  39.2  33.7*  33.8*   MCV  91.4  91.9  92.7   MCH  30.9  31.2  30.8   MCHC  33.8  34.0  33.2   RDW  13.5  13.2  14.0   PLT  245  195  209   MPV  7.9  NOT REPORTED  7.8   INR  1.1  4.8*   -- Chemistry:  Recent Labs      10/02/17   0556  10/02/17   1817  10/02/17   2353  10/03/17   0609  10/04/17   0631   NA  140   --    --   142  142   K  3.7   --    --   4.6  3.9   CL  101   --    --   103  104   CO2  29   --    --   28  26   GLUCOSE  115*   --    --   120*  94   BUN  18   --    --   13  13   CREATININE  0.86   --    --   0.85  0.79   MG   --    --    --   1.7   --    ANIONGAP  10   --    --   11  12   LABGLOM  >60   --    --   >60  >60   GFRAA  >60   --    --   >60  >60   CALCIUM  9.0   --    --   9.2  9.4   TROPONINT   --   <0.03  <0.03  <0.03   --    MYOGLOBIN   --   42  43  38   --      Recent Labs      10/02/17   0556   10/02/17   2114  10/03/17   0620  10/03/17   1115  10/03/17   1634  10/03/17   2108  10/04/17   0711   PROT  6.3*   --    --    --    --    --    --    --    LABALBU  3.6   --    --    --    --    --    --    --    AST  16   --    --    --    --    --    --    --    ALT  11   --    --    --    --    --    --    --    ALKPHOS  51   --    --    --    --    --    --    --    BILITOT  0.22*   --    --    --    --    --    --    --    POCGLU   --    < >  130*  112*  175*  86  126*  90    < > = values in this interval not displayed. UA:  Recent Labs      10/01/17   1650   COLORU  YELLOW   PHUR  5.5   WBCUA  5 TO 10   RBCUA  2 TO 5   MUCUS  NOT REPORTED   TRICHOMONAS  NOT REPORTED   YEAST  FEW*   BACTERIA  FEW*   SPECGRAV  1.005   LEUKOCYTESUR  LARGE*   UROBILINOGEN  Normal   BILIRUBINUR  NEGATIVE   GLUCOSEU  NEGATIVE   AMORPHOUS  NOT REPORTED     Culture and Sensitivities:  Recent Labs      10/01/17   1650   SPECDESC  . Random Urine Performed at [facility] and [facility],   [facility], [address]   SPECIAL  NOT REPORTED   CULTURE  NO SIGNIFICANT GROWTH  Performed at [facility] [address] [facility], [address] [place] [address] [phone]   STATUS  FINAL 10/02/2017       Physical Examination:    BP (!) 146/60  Pulse 65  Temp 97.3 °F (36.3 °C) (Oral)   Resp 16  Ht 5' 3\" (1.6 m)  Wt 161 lb 9.6 oz (73.3 kg)  SpO2 97%  BMI 28.63 kg/m2  Intake/Output Summary (Last 24 hours) at 10/04/17 1045  Last data filed at 10/04/17 0531   Gross per 24 hour   Intake             1924 ml   Output             1425 ml   Net              499 ml       General Appearance:    Alert, cooperative, no distress, appears stated age   Head:    Normocephalic, without obvious abnormality, atraumatic   Eyes:    PERRL, conjunctiva/corneas clear, EOM's intact        Ears:    Normal external ear canals, both ears   Nose:   Nares normal, septum midline, mucosa normal, no drainage    or sinus tenderness   Throat:   Lips, mucosa, and tongue normal; teeth and gums normal   Neck:   Supple, symmetrical, trachea midline, no adenopathy;        thyroid:  No enlargement/tenderness/nodules; no carotid    bruit or JVD   Back:     Symmetric, no curvature, ROM normal, no CVA tenderness   Lungs:     Clear to auscultation bilaterally, respirations unlabored   Chest wall:    No tenderness or deformity   Heart:    irregular rate and rhythm, S1 and S2 normal, no murmur, rub   or gallop   Abdomen:     Soft, non-tender, bowel sounds active all four quadrants,     no masses, no organomegaly   Extremities:   Extremities normal, atraumatic, no cyanosis or edema. Bilateral lower extremity weakness. Pulses:   2+ and symmetric all extremities   Skin:   Skin color, texture, turgor normal, no rashes or lesions   Lymph nodes:   Cervical, supraclavicular, and axillary nodes normal   Neurologic:   CNII-XII intact. Normal strength, sensation and reflexes       throughout       Assessment:     Primary Problem  Ataxia     Active Hospital Problems    Diagnosis Date Noted    Ataxia [R27.0] 10/02/2017    Lethargy [R53.83] 10/02/2017    Fall at home [T19. Josenn , C89.856] 10/02/2017    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 [I82.411] 10/02/2017    Acute cystitis without hematuria [N30.00] 10/02/2017    Acute

## 2017-10-04 NOTE — CONSULTS
[address]                                 CONSULTATION    PATIENT NAME: Alma German                  :             1932  MED REC NO:   [id number]                            ROOM:           2008  ACCOUNT NO:   [de-identified]                          ADMISSION DATE:  10/01/2017  PROVIDER:     Remi Mayes DATE:    HISTORY OF PRESENT ILLNESS:  This patient is an 80-year-old female  whom I am asked to see in neurology consultation for advice and  opinion regarding falling and possible stroke. The patient is  admitted to the hospital for management of acute onset of fatigue and  a fall at home. She had difficulty with her balance and gait for  about three hours prior to arrival in the emergency department. She  called an ambulance, thought her symptoms might be related to the  blood sugar. However, she does not check her blood sugar on a regular  basis. She is unsteady on her feet and has been since her stroke four  years ago. She felt off balance and reported that she did not hit her  head and did not have any loss of consciousness but did fall. She  denied any numbness, tingling, or weakness of her face, arm, or leg on  one side. She denied any diplopia, dysarthria, or dysphagia. She did  have some dizziness and balance difficulty, but she states that she  has had that for sometime. She denied any abdominal pain, nausea,  vomiting, or diarrhea. She has had a DVT in the past.  She has also  been uncontrolled with her diabetes in the past.  In July, she had  acute right lower extremity arterial embolus and underwent right  femoropopliteal embolectomy. She also denies any lateralizing  symptoms of claudication and remains on Eliquis. Her INR was elevated  at 4.8. She has been having some urinary retention.     PAST MEDICAL HISTORY:  Significant for atrial fibrillation, coronary  artery [id number]  Job#: [id number]     Doc#: [id number]

## 2017-10-04 NOTE — PROGRESS NOTES
activity. Conditions Requiring Skilled Therapeutic Intervention   Body structures, Functions, Activity limitations Decreased functional mobility ; Decreased strength;Decreased balance;Decreased endurance   Assessment Patient is demonstrating improve gait and activity tolerance, but still demonstrates decrease balance and has had previous fall at her apartment where she lives alone. Patient is appropraite for short stay at SNF to improve balance deficits. Prognosis Good   REQUIRES PT FOLLOW UP Yes   Discharge Recommendations Subacute/Skilled Nursing Facility   Activity Tolerance   Activity Tolerance Patient Tolerated treatment well;Patient limited by endurance   Plan   Times per week 1-2x/day and 5-6x/ week. Current Treatment Recommendations Strengthening;Balance Training;Functional Mobility Training;Transfer Training; Safety Education & Training;Positioning;Home Exercise Program;Endurance Training;Patient/Caregiver Education & Training;Gait Training;Stair training   Safety Devices   Type of devices All fall risk precautions in place;Call light within reach;Gait belt;Nurse notified; Left in chair   Dong Stephens  Time: [id number]

## 2017-10-04 NOTE — PROGRESS NOTES
Bárbara Capellan is a 80 y.o. female patient.     Current Facility-Administered Medications   Medication Dose Route Frequency Provider Last Rate Last Dose    acetaminophen-codeine (TYLENOL #3) 300-30 MG per tablet 2 tablet  2 tablet Oral Q8H PRN Charanjit Estrada NP   2 tablet at 10/03/17 1917    glimepiride (AMARYL) tablet 2 mg  2 mg Oral Daily with breakfast Jarrett Fox CNP   2 mg at 10/04/17 0828    mometasone-formoterol (DULERA) 200-5 MCG/ACT inhaler 2 puff  2 puff Inhalation BID Charanjit Estrada NP   2 puff at 10/04/17 0912    carvedilol (COREG) tablet 6.25 mg  6.25 mg Oral BID WC Charanjit Estrada NP   6.25 mg at 10/04/17 1659    apixaban (ELIQUIS) tablet 5 mg  5 mg Oral BID Charanjit Estrada NP   5 mg at 10/04/17 2768    pravastatin (PRAVACHOL) tablet 80 mg  80 mg Oral Nightly Charanjit Estrada NP   80 mg at 10/03/17 2131    spironolactone (ALDACTONE) tablet 25 mg  25 mg Oral Daily Charanjit Estrada NP   25 mg at 10/04/17 2533    sodium chloride flush 0.9 % injection 10 mL  10 mL Intravenous 2 times per day Charanjit Estrada NP   10 mL at 10/02/17 0045    sodium chloride flush 0.9 % injection 10 mL  10 mL Intravenous PRN Charanjit Estrada NP        acetaminophen (TYLENOL) tablet 650 mg  650 mg Oral Q4H PRN Charanjit Estrada NP   650 mg at 10/04/17 0828    magnesium hydroxide (MILK OF MAGNESIA) 400 MG/5ML suspension 30 mL  30 mL Oral Daily PRN Charanjit Estrada NP        ondansetron [facility]) injection 4 mg  4 mg Intravenous Q6H PRN Charanjit Estrada NP        0.9 % sodium chloride infusion   Intravenous Continuous Charanjit Estrada NP 75 mL/hr at 10/04/17 0413      levofloxacin (LEVAQUIN) 250 MG/50ML infusion 250 mg  250 mg Intravenous Q24H Charanjit Estrada NP   Stopped at 10/03/17 2231     Allergies   Allergen Reactions    Ambien [Zolpidem Tartrate]     Amoxicillin     Metformin And Related      Principal Problem:    Ataxia  Active Problems:    Atrial fibrillation (HCC)    Hypertension    CAD (coronary artery disease)    H/O: CVA (cerebrovascular accident)    Bilateral leg edema    Bilateral carotid artery stenosis    Lethargy    Fall at home    COPD (chronic obstructive pulmonary disease) (HCC)    Esophageal reflux    HTN (hypertension), benign    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 rate 16, height 5' 3\" (1.6 m), weight 161 lb 9.6 oz (73.3 kg), SpO2 97 %. Subjective:  Symptoms:  (No neuro changes. ). Objective:  General Appearance:  Uncomfortable. Vital signs: (most recent): Blood pressure (!) 146/60, pulse 65, temperature 97.3 °F (36.3 °C), temperature source Oral, resp. rate 16, height 5' 3\" (1.6 m), weight 161 lb 9.6 oz (73.3 kg), SpO2 97 %. Vital signs are normal.    Lungs:  Normal effort. Heart: Normal rate. Neurological: (MRI shows new CVA left posterior periventricular white matter. ).    carotid scan shows 50% narrowing of left ICA. Assessment:  (CVA. Need to take a closer look at the left carotid. ).        Doreen Hayes MD  10/4/2017

## 2017-10-05 VITALS
HEIGHT: 63 IN | TEMPERATURE: 97.5 F | RESPIRATION RATE: 18 BRPM | HEART RATE: 66 BPM | DIASTOLIC BLOOD PRESSURE: 49 MMHG | OXYGEN SATURATION: 98 % | WEIGHT: 161.6 LBS | SYSTOLIC BLOOD PRESSURE: 146 MMHG | BODY MASS INDEX: 28.63 KG/M2

## 2017-10-05 PROBLEM — I63.9 ACUTE CVA (CEREBROVASCULAR ACCIDENT) (HCC): Status: ACTIVE | Noted: 2017-10-05

## 2017-10-05 LAB
ABSOLUTE EOS #: 0.2 K/UL (ref 0–0.4)
ABSOLUTE LYMPH #: 1.8 K/UL (ref 1–4.8)
ABSOLUTE MONO #: 0.8 K/UL (ref 0.2–0.8)
ANION GAP SERPL CALCULATED.3IONS-SCNC: 13 MMOL/L (ref 9–17)
BASOPHILS # BLD: 1 %
BASOPHILS ABSOLUTE: 0 K/UL (ref 0–0.2)
BUN BLDV-MCNC: 10 MG/DL (ref 8–23)
BUN/CREAT BLD: 13 (ref 9–20)
CALCIUM SERPL-MCNC: 9.2 MG/DL (ref 8.6–10.4)
CHLORIDE BLD-SCNC: 103 MMOL/L (ref 98–107)
CO2: 25 MMOL/L (ref 20–31)
CREAT SERPL-MCNC: 0.76 MG/DL (ref 0.5–0.9)
DIFFERENTIAL TYPE: ABNORMAL
EOSINOPHILS RELATIVE PERCENT: 3 %
GFR AFRICAN AMERICAN: >60 ML/MIN
GFR NON-AFRICAN AMERICAN: >60 ML/MIN
GFR SERPL CREATININE-BSD FRML MDRD: NORMAL ML/MIN/{1.73_M2}
GFR SERPL CREATININE-BSD FRML MDRD: NORMAL ML/MIN/{1.73_M2}
GLUCOSE BLD-MCNC: 143 MG/DL (ref 65–105)
GLUCOSE BLD-MCNC: 82 MG/DL (ref 65–105)
GLUCOSE BLD-MCNC: 87 MG/DL (ref 70–99)
HCT VFR BLD CALC: 33.2 % (ref 36–46)
HEMOGLOBIN: 11.2 G/DL (ref 12–16)
LYMPHOCYTES # BLD: 22 %
MAGNESIUM: 1.6 MG/DL (ref 1.6–2.6)
MCH RBC QN AUTO: 30.7 PG (ref 26–34)
MCHC RBC AUTO-ENTMCNC: 33.7 G/DL (ref 31–37)
MCV RBC AUTO: 91 FL (ref 80–100)
MONOCYTES # BLD: 10 %
PDW BLD-RTO: 14 % (ref 11.5–14.5)
PLATELET # BLD: 208 K/UL (ref 130–400)
PLATELET ESTIMATE: ABNORMAL
PMV BLD AUTO: 7.5 FL (ref 6–12)
POTASSIUM SERPL-SCNC: 4 MMOL/L (ref 3.7–5.3)
RBC # BLD: 3.65 M/UL (ref 4–5.2)
RBC # BLD: ABNORMAL 10*6/UL
SEG NEUTROPHILS: 64 %
SEGMENTED NEUTROPHILS ABSOLUTE COUNT: 5.2 K/UL (ref 1.8–7.7)
SODIUM BLD-SCNC: 141 MMOL/L (ref 135–144)
WBC # BLD: 8.1 K/UL (ref 3.5–11)
WBC # BLD: ABNORMAL 10*3/UL

## 2017-10-05 PROCEDURE — G0378 HOSPITAL OBSERVATION PER HR: HCPCS

## 2017-10-05 PROCEDURE — 2580000003 HC RX 258: Performed by: NURSE PRACTITIONER

## 2017-10-05 PROCEDURE — 94640 AIRWAY INHALATION TREATMENT: CPT

## 2017-10-05 PROCEDURE — 6370000000 HC RX 637 (ALT 250 FOR IP): Performed by: NURSE PRACTITIONER

## 2017-10-05 PROCEDURE — 94760 N-INVAS EAR/PLS OXIMETRY 1: CPT

## 2017-10-05 PROCEDURE — 99217 PR OBSERVATION CARE DISCHARGE MANAGEMENT: CPT | Performed by: INTERNAL MEDICINE

## 2017-10-05 PROCEDURE — 36415 COLL VENOUS BLD VENIPUNCTURE: CPT

## 2017-10-05 PROCEDURE — 80048 BASIC METABOLIC PNL TOTAL CA: CPT

## 2017-10-05 PROCEDURE — 97530 THERAPEUTIC ACTIVITIES: CPT

## 2017-10-05 PROCEDURE — 82947 ASSAY GLUCOSE BLOOD QUANT: CPT

## 2017-10-05 PROCEDURE — 83735 ASSAY OF MAGNESIUM: CPT

## 2017-10-05 PROCEDURE — 85025 COMPLETE CBC W/AUTO DIFF WBC: CPT

## 2017-10-05 PROCEDURE — 97110 THERAPEUTIC EXERCISES: CPT

## 2017-10-05 PROCEDURE — 97116 GAIT TRAINING THERAPY: CPT

## 2017-10-05 PROCEDURE — 2500000003 HC RX 250 WO HCPCS: Performed by: NURSE PRACTITIONER

## 2017-10-05 RX ORDER — GLIMEPIRIDE 2 MG/1
2 TABLET ORAL
Qty: 30 TABLET | Refills: 3 | DISCHARGE
Start: 2017-10-05 | End: 2019-10-24

## 2017-10-05 RX ORDER — ACETAMINOPHEN AND CODEINE PHOSPHATE 300; 30 MG/1; MG/1
2 TABLET ORAL EVERY 8 HOURS PRN
Qty: 18 TABLET | Refills: 0 | Status: SHIPPED | OUTPATIENT
Start: 2017-10-05 | End: 2017-10-08

## 2017-10-05 RX ORDER — LEVOFLOXACIN 5 MG/ML
500 INJECTION, SOLUTION INTRAVENOUS EVERY 24 HOURS
Status: DISCONTINUED | OUTPATIENT
Start: 2017-10-05 | End: 2017-10-05 | Stop reason: HOSPADM

## 2017-10-05 RX ORDER — ACETAMINOPHEN 325 MG/1
650 TABLET ORAL EVERY 4 HOURS PRN
Qty: 120 TABLET | Refills: 3 | Status: ON HOLD | DISCHARGE
Start: 2017-10-05 | End: 2021-06-22

## 2017-10-05 RX ORDER — LEVOFLOXACIN 500 MG/1
500 TABLET, FILM COATED ORAL DAILY
Qty: 7 TABLET | Refills: 0 | DISCHARGE
Start: 2017-10-05 | End: 2017-10-12

## 2017-10-05 RX ADMIN — METOPROLOL TARTRATE 5 MG: 5 INJECTION INTRAVENOUS at 08:03

## 2017-10-05 RX ADMIN — ACETAMINOPHEN 650 MG: 325 TABLET ORAL at 02:24

## 2017-10-05 RX ADMIN — GLIMEPIRIDE 2 MG: 2 TABLET ORAL at 08:03

## 2017-10-05 RX ADMIN — ACETAMINOPHEN 650 MG: 325 TABLET ORAL at 06:53

## 2017-10-05 RX ADMIN — CARVEDILOL 6.25 MG: 6.25 TABLET, FILM COATED ORAL at 08:03

## 2017-10-05 RX ADMIN — SPIRONOLACTONE 25 MG: 25 TABLET, FILM COATED ORAL at 08:03

## 2017-10-05 RX ADMIN — SODIUM CHLORIDE: 9 INJECTION, SOLUTION INTRAVENOUS at 07:37

## 2017-10-05 RX ADMIN — APIXABAN 5 MG: 5 TABLET, FILM COATED ORAL at 08:03

## 2017-10-05 RX ADMIN — MOMETASONE FUROATE AND FORMOTEROL FUMARATE DIHYDRATE 2 PUFF: 200; 5 AEROSOL RESPIRATORY (INHALATION) at 09:15

## 2017-10-05 ASSESSMENT — PAIN DESCRIPTION - DESCRIPTORS: DESCRIPTORS: ACHING

## 2017-10-05 ASSESSMENT — PAIN DESCRIPTION - ORIENTATION: ORIENTATION: LEFT

## 2017-10-05 ASSESSMENT — PAIN SCALES - GENERAL
PAINLEVEL_OUTOF10: 5
PAINLEVEL_OUTOF10: 5
PAINLEVEL_OUTOF10: 3

## 2017-10-05 ASSESSMENT — PAIN DESCRIPTION - PAIN TYPE: TYPE: CHRONIC PAIN

## 2017-10-05 ASSESSMENT — PAIN DESCRIPTION - FREQUENCY: FREQUENCY: CONTINUOUS

## 2017-10-05 ASSESSMENT — PAIN DESCRIPTION - ONSET: ONSET: ON-GOING

## 2017-10-05 ASSESSMENT — PAIN DESCRIPTION - PROGRESSION: CLINICAL_PROGRESSION: NOT CHANGED

## 2017-10-05 ASSESSMENT — PAIN DESCRIPTION - LOCATION: LOCATION: LEG

## 2017-10-05 NOTE — PROCEDURES
[address]               [address] [facility], [address] [place] [address]                         ELECTROENCEPHALOGRAM REPORT    PATIENT NAME: Sara Chapman                    :             1932  MED REC NO:   [id number]                              ROOM:              ACCOUNT NO:   [de-identified]                            ADMISSION DATE:  10/01/2017  PROVIDER:     Karri Shah Counts OF EEG:  10/04/2017    INDICATIONS:  This patient is an 42-year-old female with contusion. MEDICATIONS:  On the electronic health record. EEG DIAGNOSIS:  Normal.    EEG DESCRIPTION:  This is a 17-channel EEG with 1-channel dedicated to  EKG monitoring. The resting wakeful background rhythm during the  maximally awake state is a 9.5 Hz alpha rhythm seen over the posterior  head regions and attenuating with eye opening bilaterally. Hyperventilation was not performed. Photic stimulation produced no  activation. The patient did not become drowsy nor entered stage II  sleep. EEG INTERPRETATION:  This EEG is within normal limits for a patient of  this age in the awake state.         KARRI ONEILL    D: 10/04/2017 11:53:40       T: 10/05/2017 2:42:54     [email or web address]  Job#: [id number]     Doc#: [id number]

## 2017-10-05 NOTE — CARE COORDINATION
ANT called [facility] Calabrese to check the status of referral, pt has been accepted, and Didi Izquierdo would like to know if pt will be ready to discharge before the weekend because the facility is tight on beds. Ama Hamilton will discuss in report.
F/U appointment scheduled with Dr. Ainslye Duenas 10-13-17 at 10:30am and pt informed.
Pt refusing SNF and agreeable to home with [facility] for SN, PT/OT. REBECCA updated and given to Andres Kirk from [facility] with face sheet. Ama Hamilton informed to cancel referral to [facility].
SW received call from [address] regarding pt and family requested referral to [facility], [address] faxed referral
Thank you for consult. Patient follows with Dr. Luis Nichole. Will change order.  Discussed with Unit Coordinator who will notify MD.     Electronically signed by Lorenzo Cruz CNP on 10/3/2017 at 9:33 AM
Xochitl from therapy recommending SNF. Discussed options with pt and she would like me to talk to BLANCA who is a nurse. Called BLANCA Cardoza on phone and [facility] and 2nd choice is [facility].  Salome CAIN informed of choices and will start referral.
and has been independent and is able to drive. Uses walker for long distances and had been walking for an hour a day until she had surgery on July 2, 2017 for blood clot in rt leg. Has son and ex DIL that help pt with meals, shopping and appointments. List for home delivered meals given to pt. Princess Rogerss at home yesterday and called for help with med alert button. Jelly Abdullahi CNP requested PT/OT order. Xochitl from therapy informed of therapy needs. Pt is agreeable to [facility]. or [facility] if needed. REBECCA initiated. Will continue to follow for therapies recommendations.         Electronically signed by Remo Runner, RN on 10/2/17 at 12:05 PM

## 2017-10-05 NOTE — DISCHARGE SUMMARY
([facility] [address]     CAD (coronary artery disease)     CHF (congestive heart failure) (HCC)     COPD (chronic obstructive pulmonary disease) (HCC)     Esophageal reflux     Other and unspecified hyperlipidemia     Other primary cardiomyopathies     Type II or unspecified type diabetes mellitus without mention of complication, not stated as uncontrolled     Unspecified asthma     Unspecified essential hypertension      The patient was seen and examined on day of discharge and this discharge summary is in conjunction with any daily progress note from day of discharge. Code Status:  Full Code    Hospital Course:   C/C:       Chief Complaint   Patient presents with    Fatigue    Fall         Interval History: Status: significantly improved. Patient remains alert and oriented ×3. Her primary complaint of leg weaknessis improving. She is working with physical therapy who has recommended a skilled nursing facility. The plan is to discharge to [facility] today. CTA of the neck revealed an incidental finding of an unruptured anterior communicating aneurysm. The patient will follow-up with the endovascular neurosurgical team for possible angio and endovascular treatment. This will be arranged outpatient. In addition, MRI of the brain shows a small acute infarct within the left posterior periventricular white matter without mass affect or shift. This finding along with her urinary tract infection may account for her recent leg weakness and ataxia. Patient [facility] independently [place] [facility] community. She states she is quite active but is still recovering from her embolectomy in July. Her children help by bringing meals to her. MRI brain is pending. The patient will be continued on Levaquin for her urinary tract infection. Vascular consultation has been reviewed. Recommendation is to continue Eliquis.     History: \"Patient was admitted for acute onset fatigue with falls/unsteady gait on 10/1/17.  CT brain negative and patient reports never losing consciousness at home. She was found to have positive UTI with cultures negative for any growth. Neurology and vascular have both been consulted. Neurology work-up includes pending MRI, carotids, and EEG to r/o seizure. Had extensive w/u at [facility] in July of this year, where she had RLE femoral and popliteal embolectomy and placed on Eliquis.     Using walker to assist with ambulation. Admits to generalized weakness. States she gets sob off and on, but is not sob at present. Denies CP, palpitations, or edema. Denies epistaxis, hemoptysis, hematemesis, hematuria, hematochezia, or melena. Admits to having BLE pain while she is up and walking. States pain has increased more now due to not getting up much and trying to ambulate. She is not able to get around as much as she was prior to admission.   Nurse reports that she has had some episodic confusion with complaints of sob, however,the pulse ox is always above 95% and lungs are clear. The sob then goes away very quickly without intervention. \"      Consults:  cardiology, neurology, vascular surgery and neurosurgery    Significant Diagnostic Studies: as above, and as follows:     MRI brain with contrast: 10/4/2017  1. Small acute infarct within the left posterior periventricular white matter.  No mass effect or midline shift. 2. T2 hyperintensity along is the mesial frontal lobes bilaterally, right greater than left. 3. Global parenchymal volume loss with chronic microvascular ischemic change. 4. Scattered sinusitis. 5. Carried type 1 malformation.         CTA neck with contrast: 10/4/2017  1. There is a 3 mm anteriorly projecting intracranial aneurysm arising in the   region of the anterior communicating artery. 2. There is a diffusely diminutive appearance of the left internal carotid   artery compared the right.  40% focal stenosis of the right VICKY and 50% focal   stenosis of the left ICA by NASCET criteria.    3. Moderate focal stenosis at the origin of both vertebral arteries. 4. There is a 2 cm hypodense nodule within the thyroid isthmus.  Suggest   further evaluation with a nonemergent thyroid ultrasound.         EEG: 10/5/2017  This EEG is within normal limits for a patient of this age in the awake state.     Bilateral carotid ultrasound: 10/3/2017   25% right internal carotid artery stenosis.    45-50 % left internal carotid artery stenosis.    Patent vertebral arteries with antegrade flow.      CT chest with contrast: 10/1/2017  1. No focal airspace disease.  No pulmonary mass identified. 2. Cardiomegaly.      CT head without contrast: 10/1/2017  BRAIN/VENTRICLES: There is no acute intracranial hemorrhage, mass effect or midline shift.  No abnormal extra-axial fluid collection.  The gray-white differentiation is maintained without evidence of an acute infarct.       Encephalomalacia along the anterior right centrum semi ovale suggesting remote infarct.  There is no evidence of hydrocephalus.        ORBITS: The visualized portion of the orbits demonstrate no acute abnormality.        SINUSES: Moderate mucosal thickening of the left sphenoid sinus and mild   mucosal thickening within the right frontal sinus suggesting chronic   sinusitis.        SOFT TISSUES/SKULL:  No acute abnormality of the visualized skull or soft tissues.      Hematology:       Recent Labs       10/03/17   0609  10/05/17   0653   WBC  6.8  8.1   RBC  3.65*  3.65*   HGB  11.2*  11.2*   HCT  33.8*  33.2*   MCV  92.7  91.0   MCH  30.8  30.7   MCHC  33.2  33.7   RDW  14.0  14.0   PLT  209  208   MPV  7.8  7.5      Chemistry:          Recent Labs       10/02/17   1817  10/02/17   2353  10/03/17   0609  10/04/17   0631  10/05/17   0653   NA   --    --   142  142  141   K   --    --   4.6  3.9  4.0   CL   --    --   103  104  103   CO2   --    --   28  26  25   GLUCOSE   --    --   120*  94  87   BUN   --    --   13  13  10   CREATININE   --    --   0.85  0.79  0.76 MG   --    --   1.7   --   1.6   ANIONGAP   --    --   11  12  13   LABGLOM   --    --   >60  >60  >60   GFRAA   --    --   >60  >60  >60   CALCIUM   --    --   9.2  9.4  9.2   TROPONINT  <0.03  <0.03  <0.03   --    --    MYOGLOBIN  42  43  38   --    --                Recent Labs       10/03/17   1634  10/03/17   2108  10/04/17   0711  10/04/17   1129  10/04/17   2127  10/05/17   0657   POCGLU  86  126*  90  149*  143*  82         Treatments: as above    Disposition: home    Discharged Condition: Stable    Follow Up:  Estela Stewart MD in two weeks    Discharge Medications:    Paul Mckenna   Home Medication Instructions PTV:[id number]    Printed on:10/05/17 1115   Medication Information                      acetaminophen (TYLENOL) 325 MG tablet  Take 2 tablets by mouth every 4 hours as needed for Fever             acetaminophen-codeine (TYLENOL #3) 300-30 MG per tablet  Take 2 tablets by mouth every 8 hours as needed for Pain             budesonide-formoterol (SYMBICORT) 160-4.5 MCG/ACT AERO  Inhale 2 puffs into the lungs 2 times daily             Calcium Carbonate-Vitamin D (CALCIUM + D PO)  Take by mouth             carvedilol (COREG) 6.25 MG tablet  TAKE (1) TABLET EVERY MORNING AND EVENING             Cholecalciferol (VITAMIN D3) 2000 UNITS CAPS  Take by mouth             docusate sodium (COLACE) 100 MG capsule  Take 100 mg by mouth as needed for Constipation             ELIQUIS 5 MG TABS tablet  TAKE (1) TABLET EVERY MORNING AND EVENING             fluticasone (FLONASE) 50 MCG/ACT nasal spray  1 spray by Nasal route daily             glimepiride (AMARYL) 2 MG tablet  Take 1 tablet by mouth daily (with breakfast)             ketoconazole (NIZORAL) 2 % cream               levofloxacin (LEVAQUIN) 500 MG tablet  Take 1 tablet by mouth daily for 7 days             mometasone-formoterol (DULERA) 200-5 MCG/ACT inhaler  Inhale 2 puffs into the lungs every 12 hours             omeprazole (PRILOSEC) 20 MG

## 2017-10-05 NOTE — PROGRESS NOTES
weakness, seizures, weakness and headaches. Endo/Heme/Allergies: Does not bruise/bleed easily. Psychiatric/Behavioral: Negative for depression. The patient is not nervous/anxious. Medications: Allergies:    Allergies   Allergen Reactions    Ambien [Zolpidem Tartrate]     Amoxicillin     Metformin And Related        Current Meds:     levofloxacin (LEVAQUIN) 500 MG/100ML infusion 500 mg Q24H   sodium chloride flush 0.9 % injection 10 mL BID   0.9 % sodium chloride bolus Once   metoprolol (LOPRESSOR) injection 5 mg Q4H PRN   acetaminophen-codeine (TYLENOL #3) 300-30 MG per tablet 2 tablet Q8H PRN   glimepiride (AMARYL) tablet 2 mg Daily with breakfast   mometasone-formoterol (DULERA) 200-5 MCG/ACT inhaler 2 puff BID   carvedilol (COREG) tablet 6.25 mg BID WC   apixaban (ELIQUIS) tablet 5 mg BID   pravastatin (PRAVACHOL) tablet 80 mg Nightly   spironolactone (ALDACTONE) tablet 25 mg Daily   sodium chloride flush 0.9 % injection 10 mL 2 times per day   sodium chloride flush 0.9 % injection 10 mL PRN   acetaminophen (TYLENOL) tablet 650 mg Q4H PRN   magnesium hydroxide (MILK OF MAGNESIA) 400 MG/5ML suspension 30 mL Daily PRN   ondansetron (ZOFRAN) injection 4 mg Q6H PRN   0.9 % sodium chloride infusion Continuous       Data:     Code Status:  Full Code    Labs:    Hematology:  Recent Labs      10/03/17   0609  10/05/17   0653   WBC  6.8  8.1   RBC  3.65*  3.65*   HGB  11.2*  11.2*   HCT  33.8*  33.2*   MCV  92.7  91.0   MCH  30.8  30.7   MCHC  33.2  33.7   RDW  14.0  14.0   PLT  209  208   MPV  7.8  7.5     Chemistry:  Recent Labs      10/02/17   1817  10/02/17   2353  10/03/17   0609  10/04/17   0631  10/05/17   0653   NA   --    --   142  142  141   K   --    --   4.6  3.9  4.0   CL   --    --   103  104  103   CO2   --    --   28  26  25   GLUCOSE   --    --   120*  94  87   BUN   --    --   13  13  10   CREATININE   --    --   0.85  0.79  0.76   MG   --    --   1.7   --   1.6   ANIONGAP   --    -- 11  12  13   LABGLOM   --    --   >60  >60  >60   GFRAA   --    --   >60  >60  >60   CALCIUM   --    --   9.2  9.4  9.2   TROPONINT  <0.03  <0.03  <0.03   --    --    MYOGLOBIN  42  43  38   --    --      Recent Labs      10/03/17   1634  10/03/17   2108  10/04/17   0711  10/04/17   1129  10/04/17   2127  10/05/17   0657   POCGLU  86  126*  90  149*  143*  82       Physical Examination:    BP (!) 141/76  Pulse 81  Temp 97.5 °F (36.4 °C) (Oral)   Resp 16  Ht 5' 3\" (1.6 m)  Wt 161 lb 9.6 oz (73.3 kg)  SpO2 99%  BMI 28.63 kg/m2    Intake/Output Summary (Last 24 hours) at 10/05/17 1055  Last data filed at 10/05/17 0939   Gross per 24 hour   Intake              650 ml   Output             1250 ml   Net             -600 ml       General Appearance:    Alert, cooperative, no distress, appears stated age   Head:    Normocephalic, without obvious abnormality, atraumatic   Eyes:    PERRL, conjunctiva/corneas clear, EOM's intact        Ears:    Normal external ear canals, both ears   Nose:   Nares normal, septum midline, mucosa normal, no drainage    or sinus tenderness   Throat:   Lips, mucosa, and tongue normal; teeth and gums normal   Neck:   Supple, symmetrical, trachea midline, no adenopathy;        thyroid:  No enlargement/tenderness/nodules; no carotid    bruit or JVD   Back:     Symmetric, no curvature, ROM normal, no CVA tenderness   Lungs:     Clear to auscultation bilaterally, respirations unlabored   Chest wall:    No tenderness or deformity   Heart:    irregular rate and rhythm, S1 and S2 normal, no murmur, rub   or gallop   Abdomen:     Soft, non-tender, bowel sounds active all four quadrants,     no masses, no organomegaly   Extremities:   Extremities normal, atraumatic, no cyanosis or edema. Bilateral lower extremity weakness.    Pulses:   2+ and symmetric all extremities   Skin:   Skin color, texture, turgor normal, no rashes or lesions   Lymph nodes:   Cervical, supraclavicular, and axillary nodes normal   Neurologic:   CNII-XII intact. Normal strength, sensation and reflexes       throughout       Assessment:     Primary Problem  Acute CVA (cerebrovascular accident) [facility])     EZIO/James Sharma 1106 Problems    Diagnosis Date Noted    Acute CVA (cerebrovascular accident) ([facility] [address] [I63.9] 10/05/2017    Ataxia [R27.0] 10/02/2017    Lethargy [R53.83] 10/02/2017    Fall at home [P36. 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) [I82.411] 10/02/2017    Acute cystitis without hematuria [N30.00] 10/02/2017    Acute urinary retention [R33.8] 10/02/2017    COPD (chronic obstructive pulmonary disease) (HCC) [J44.9]     Esophageal reflux [K21.9]     HTN (hypertension), benign [I10]     Bilateral carotid artery stenosis [I65.23] 08/01/2017    Bilateral leg edema [R60.0] 06/26/2017    Atrial fibrillation (HCC) [I48.91] 06/18/2015    CAD (coronary artery disease) [I25.10] 06/18/2015    H/O: CVA (cerebrovascular accident) [Z86.73] 06/18/2015    Hypertension [I10] 06/18/2015     Past Medical History:   Diagnosis Date    [facility]rial fibrillation ([facility]     CAD (coronary artery disease)     CHF (congestive heart failure) (HCC)     COPD (chronic obstructive pulmonary disease) (HCC)     Esophageal reflux     Other and unspecified hyperlipidemia     Other primary cardiomyopathies     Type II or unspecified type diabetes mellitus without mention of complication, not stated as uncontrolled     Unspecified asthma     Unspecified essential hypertension       Plan:     1. Continue Levaquin for urinary tract infectionfor 7 more days  2. Follow-up with the endovascular neurosurgical team for possible angio and endovascular treatment  3. Continue DVT prophylaxis with Eliquis  4. Blood pressure control  5. Continue home medications for hypertension and coronary artery disease  6. Atrial fibrillation: Controlled heart rate  7. Continue skilled physical therapy for ataxia  8.  Monitor and control blood sugars  9.  [facility] for rehab prior to returning to independent living      Electronically signed by Kg Alvarado DO on 10/5/2017 at 10:55 AM

## 2017-10-05 NOTE — FLOWSHEET NOTE
10/04/17 2132   Provider Notification   Reason for Communication Evaluate   Provider Name    Provider Notification Physician   Method of Communication Page   Response Waiting for response   Notification Time 2133   CTA neck with contrast results

## 2017-10-05 NOTE — PROGRESS NOTES
Loida Goldstein is a 80 y.o. female patient.     Current Facility-Administered Medications   Medication Dose Route Frequency Provider Last Rate Last Dose    sodium chloride flush 0.9 % injection 10 mL  10 mL Intravenous BID Celso Ray MD        0.9 % sodium chloride bolus  50 mL Intravenous Once Celso Ray MD   Stopped at 10/04/17 2002    metoprolol (LOPRESSOR) injection 5 mg  5 mg Intravenous Q4H PRN Amisha Cates NP   5 mg at 10/04/17 2235    acetaminophen-codeine (TYLENOL #3) 300-30 MG per tablet 2 tablet  2 tablet Oral Q8H PRN Amisha Cates NP   2 tablet at 10/03/17 1917    glimepiride (AMARYL) tablet 2 mg  2 mg Oral Daily with breakfast Mariana Anders CNP   2 mg at 10/04/17 0828    mometasone-formoterol (DULERA) 200-5 MCG/ACT inhaler 2 puff  2 puff Inhalation BID Amisha Cates NP   2 puff at 10/04/17 2134    carvedilol (COREG) tablet 6.25 mg  6.25 mg Oral BID  Amisha Cates NP   6.25 mg at 10/04/17 1659    apixaban (ELIQUIS) tablet 5 mg  5 mg Oral BID Amisha Cates NP   5 mg at 10/04/17 2234    pravastatin (PRAVACHOL) tablet 80 mg  80 mg Oral Nightly Amisha Cates NP   80 mg at 10/04/17 2234    spironolactone (ALDACTONE) tablet 25 mg  25 mg Oral Daily Amisha Cates NP   25 mg at 10/04/17 7235    sodium chloride flush 0.9 % injection 10 mL  10 mL Intravenous 2 times per day Amisha Cates NP   10 mL at 10/04/17 2235    sodium chloride flush 0.9 % injection 10 mL  10 mL Intravenous PRN Amisha Cates NP   10 mL at 10/04/17 2003    acetaminophen (TYLENOL) tablet 650 mg  650 mg Oral Q4H PRN Amisha Cates NP   650 mg at 10/05/17 0224    magnesium hydroxide (MILK OF MAGNESIA) 400 MG/5ML suspension 30 mL  30 mL Oral Daily PRN Amisha Cates NP        ondansetron [facility]) injection 4 mg  4 mg Intravenous Q6H PRN Amisha Cates NP        0.9 % sodium chloride infusion

## 2017-10-05 NOTE — PROGRESS NOTES
Pt discharged via wc to home in car with grandson in stable condition.  Pt understood and signed AVS.

## 2017-10-05 NOTE — PROGRESS NOTES
= 0     Steady, safe = 1    Rises from chair     Score: [] 0 [x] 1 [] 2  Unable to without help = 0  Able, uses arms to help = 1  Able without use of arms = 2    Attempts to rise     Score: [] 0 [x] 1 [] 2  Unable to without help = 0  Able, requires > 1 attempt = 1  Able to rise, 1 attempt = 2    Immediate standing Balance (first 5 seconds) Score: [] 0 [x] 1 [] 2  Unsteady (staggers, moves feet, trunk sway) = 0  Steady but uses walker or other support = 1  Steady without walker or other support = 2     Standing balance     Score: [] 0 [x] 1 [] 2  Unsteady = 0  Steady but wide stance and uses support = 1  Narrow stance without support = 2    Nudged (sternal nudge)    Score: [x] 0 [] 1 []   2  Begins to fall = 0  Staggers, grabs, catches self = 1  Steady = 2    Eyes closed      Score: [x] 0 [] 1  Unsteady = 0  Steady = 1    Turning 360 degrees    Score: [x] 0 [] 1  (A)  (A) Discontinuous steps = 0      (A) Continuous = 1  (B) Unsteady= 0     Score: [x] 0 [] 1  (B)  (B) Steady = 1    Sitting down     Score: [x] 0 [] 1 [] 2  Unsafe (misjudged distance, falls into chair) = 0  Uses arms or not a smooth motion = 1  Safe, smooth motion = 2    Balance Total Score    Score:     5 /16            GAIT SECTION    Patient stands with therapist, walks across room (+/- aids), first at usual pace, then at rapid pace.   Indication of gait (Immediately after told to [facility].) Score: [] 0 [x] 1  Any hesitancy or multiple attempts = 0  No hesitancy = 1    Step length and height    Score: [] 0 [x] L    [x] R  Step to = 0  Step through R = 1  Step through L = 1    Foot clearance     Score: [] 0 [x] L     [x] R  Foot drop = 0  L foot clears floor = 1  R foot clears floor = 1    Step symmetry     Score: [] 0 [x] 1  Right and left step length not equal = 0  Right and left step length appear equal = 1    Step continuity     Score: [x] 0 [] 1  Stopping or discontinuity between steps = 0  Steps appear continuous = 1    Path (Excursion)     Score: [] 0 [x] 1 [] 2  Marked deviation = 0  Mild/moderate deviation or uses w. aid = 1  Straight without w. aid = 2    Trunk       Score: [] 0 [x] 1 [] 2  Marked sway or uses w. aid = 0  No sway but flex. knees or back or  uses arms for stability = 1  No sway, flex. , use of arms or w. aid = 2    Walking time     Score: [] 0 [x] 1  Heels apart = 0  Heels almost touching while walking = 1    Gait Total Score     Score:     9 /12              TOTAL SCORE = BALANCE + GAIT  Score:      14 /28         Risk Indicators:   Score 18 or less = High risk of falls  Score 19-23 = Moderate risk of falls  Score 24 or more = Low risk of falls    Luciusetti ME, Andrea TF, Case R, Fall Risk Index for elderly patients based on number of chronic disabilities.   Am J Med [phone]

## 2017-10-05 NOTE — FLOWSHEET NOTE
10/04/17 2134   Cough/Sputum   Cough None   Provider Notification   Reason for Communication Evaluate   Provider Name    Provider Notification Physician   Method of Communication Call   Response See orders  (Neurosurgery consult )       No in hospital evaluation.  would like pt to follow up outpatient in office.

## 2017-10-05 NOTE — PROGRESS NOTES
Spoke to pt about her discharge, she said she is going home instead that her daughter is home now getting things setup. Informed her that therapy is recommending a short stay at a rehab, but pt said it is too much red tape if she isn't going to be there that long. Discharge Planner informed and .

## 2017-10-06 LAB
EKG ATRIAL RATE: 75 BPM
EKG Q-T INTERVAL: 368 MS
EKG QRS DURATION: 84 MS
EKG QTC CALCULATION (BAZETT): 399 MS
EKG R AXIS: 12 DEGREES
EKG T AXIS: -98 DEGREES
EKG VENTRICULAR RATE: 71 BPM
GLUCOSE BLD-MCNC: 136 MG/DL (ref 65–105)
GLUCOSE BLD-MCNC: 93 MG/DL (ref 65–105)

## 2018-08-18 ENCOUNTER — HOSPITAL ENCOUNTER (INPATIENT)
Age: 83
LOS: 1 days | Discharge: HOME | DRG: 813 | End: 2018-08-19
Attending: EMERGENCY MEDICINE | Admitting: INTERNAL MEDICINE
Payer: MEDICARE

## 2018-08-18 DIAGNOSIS — R58 BLEEDING: Primary | ICD-10-CM

## 2018-08-18 PROBLEM — E11.9 DIABETES MELLITUS TYPE 2, DIET-CONTROLLED (HCC): Status: ACTIVE | Noted: 2018-08-18

## 2018-08-18 PROBLEM — T81.9XXA POSTOPERATIVE OR SURGICAL COMPLICATION, INITIAL ENCOUNTER: Status: ACTIVE | Noted: 2018-08-18

## 2018-08-18 LAB
ABSOLUTE EOS #: 0.1 K/UL (ref 0–0.4)
ABSOLUTE IMMATURE GRANULOCYTE: ABNORMAL K/UL (ref 0–0.3)
ABSOLUTE LYMPH #: 1.4 K/UL (ref 1–4.8)
ABSOLUTE MONO #: 0.5 K/UL (ref 0.1–1.2)
BASOPHILS # BLD: 1 % (ref 0–2)
BASOPHILS ABSOLUTE: 0.1 K/UL (ref 0–0.2)
DIFFERENTIAL TYPE: ABNORMAL
EOSINOPHILS RELATIVE PERCENT: 3 % (ref 1–4)
HCT VFR BLD CALC: 34.8 % (ref 36–46)
HEMOGLOBIN: 11.5 G/DL (ref 12–16)
IMMATURE GRANULOCYTES: ABNORMAL %
INR BLD: 1.1
LYMPHOCYTES # BLD: 34 % (ref 24–44)
MCH RBC QN AUTO: 31.3 PG (ref 26–34)
MCHC RBC AUTO-ENTMCNC: 33 G/DL (ref 31–37)
MCV RBC AUTO: 94.9 FL (ref 80–100)
MONOCYTES # BLD: 12 % (ref 2–11)
NRBC AUTOMATED: ABNORMAL PER 100 WBC
PARTIAL THROMBOPLASTIN TIME: 28.5 SEC (ref 21.3–31.3)
PDW BLD-RTO: 12.9 % (ref 12.5–15.4)
PLATELET # BLD: 194 K/UL (ref 140–450)
PLATELET ESTIMATE: ABNORMAL
PMV BLD AUTO: 8.5 FL (ref 6–12)
PROTHROMBIN TIME: 11.1 SEC (ref 9.4–12.6)
RBC # BLD: 3.67 M/UL (ref 4–5.2)
RBC # BLD: ABNORMAL 10*6/UL
SEG NEUTROPHILS: 50 % (ref 36–66)
SEGMENTED NEUTROPHILS ABSOLUTE COUNT: 2.1 K/UL (ref 1.8–7.7)
WBC # BLD: 4.3 K/UL (ref 3.5–11)
WBC # BLD: ABNORMAL 10*3/UL

## 2018-08-18 PROCEDURE — 2500000003 HC RX 250 WO HCPCS: Performed by: EMERGENCY MEDICINE

## 2018-08-18 PROCEDURE — 96374 THER/PROPH/DIAG INJ IV PUSH: CPT

## 2018-08-18 PROCEDURE — 85025 COMPLETE CBC W/AUTO DIFF WBC: CPT

## 2018-08-18 PROCEDURE — 36415 COLL VENOUS BLD VENIPUNCTURE: CPT

## 2018-08-18 PROCEDURE — 85610 PROTHROMBIN TIME: CPT

## 2018-08-18 PROCEDURE — 99284 EMERGENCY DEPT VISIT MOD MDM: CPT

## 2018-08-18 PROCEDURE — G0378 HOSPITAL OBSERVATION PER HR: HCPCS

## 2018-08-18 PROCEDURE — 1200000000 HC SEMI PRIVATE

## 2018-08-18 PROCEDURE — 2580000003 HC RX 258: Performed by: EMERGENCY MEDICINE

## 2018-08-18 PROCEDURE — 99223 1ST HOSP IP/OBS HIGH 75: CPT | Performed by: NURSE PRACTITIONER

## 2018-08-18 PROCEDURE — 85730 THROMBOPLASTIN TIME PARTIAL: CPT

## 2018-08-18 RX ORDER — SODIUM CHLORIDE 0.9 % (FLUSH) 0.9 %
10 SYRINGE (ML) INJECTION EVERY 12 HOURS SCHEDULED
Status: DISCONTINUED | OUTPATIENT
Start: 2018-08-19 | End: 2018-08-19 | Stop reason: HOSPADM

## 2018-08-18 RX ORDER — ALBUTEROL SULFATE 2.5 MG/3ML
2.5 SOLUTION RESPIRATORY (INHALATION) EVERY 6 HOURS PRN
COMMUNITY

## 2018-08-18 RX ORDER — METOPROLOL TARTRATE 5 MG/5ML
5 INJECTION INTRAVENOUS EVERY 4 HOURS PRN
Status: DISCONTINUED | OUTPATIENT
Start: 2018-08-18 | End: 2018-08-19 | Stop reason: HOSPADM

## 2018-08-18 RX ORDER — GUAIFENESIN, PSEUDOEPHEDRINE HYDROCHLORIDE 600; 60 MG/1; MG/1
1 TABLET, EXTENDED RELEASE ORAL 2 TIMES DAILY PRN
COMMUNITY

## 2018-08-18 RX ORDER — BISACODYL 10 MG
10 SUPPOSITORY, RECTAL RECTAL DAILY PRN
Status: DISCONTINUED | OUTPATIENT
Start: 2018-08-18 | End: 2018-08-19 | Stop reason: HOSPADM

## 2018-08-18 RX ORDER — SODIUM CHLORIDE 0.9 % (FLUSH) 0.9 %
10 SYRINGE (ML) INJECTION PRN
Status: DISCONTINUED | OUTPATIENT
Start: 2018-08-18 | End: 2018-08-19 | Stop reason: HOSPADM

## 2018-08-18 RX ORDER — ALBUTEROL SULFATE 2.5 MG/3ML
2.5 SOLUTION RESPIRATORY (INHALATION)
Status: DISCONTINUED | OUTPATIENT
Start: 2018-08-18 | End: 2018-08-19 | Stop reason: HOSPADM

## 2018-08-18 RX ORDER — POTASSIUM CHLORIDE 20 MEQ/1
40 TABLET, EXTENDED RELEASE ORAL PRN
Status: DISCONTINUED | OUTPATIENT
Start: 2018-08-18 | End: 2018-08-19 | Stop reason: HOSPADM

## 2018-08-18 RX ORDER — ONDANSETRON 2 MG/ML
4 INJECTION INTRAMUSCULAR; INTRAVENOUS EVERY 6 HOURS PRN
Status: DISCONTINUED | OUTPATIENT
Start: 2018-08-18 | End: 2018-08-19 | Stop reason: SDUPTHER

## 2018-08-18 RX ORDER — ACETAMINOPHEN 325 MG/1
650 TABLET ORAL EVERY 4 HOURS PRN
Status: DISCONTINUED | OUTPATIENT
Start: 2018-08-18 | End: 2018-08-19 | Stop reason: HOSPADM

## 2018-08-18 RX ORDER — ALBUTEROL SULFATE 90 UG/1
2 AEROSOL, METERED RESPIRATORY (INHALATION) EVERY 6 HOURS PRN
COMMUNITY

## 2018-08-18 RX ORDER — NICOTINE POLACRILEX 4 MG
15 LOZENGE BUCCAL PRN
Status: DISCONTINUED | OUTPATIENT
Start: 2018-08-18 | End: 2018-08-19 | Stop reason: HOSPADM

## 2018-08-18 RX ORDER — MAGNESIUM OXIDE 400 MG/1
400 TABLET ORAL DAILY
COMMUNITY
End: 2019-10-24

## 2018-08-18 RX ORDER — DEXTROSE MONOHYDRATE 25 G/50ML
12.5 INJECTION, SOLUTION INTRAVENOUS PRN
Status: DISCONTINUED | OUTPATIENT
Start: 2018-08-18 | End: 2018-08-19 | Stop reason: HOSPADM

## 2018-08-18 RX ORDER — POTASSIUM CHLORIDE 7.45 MG/ML
10 INJECTION INTRAVENOUS PRN
Status: DISCONTINUED | OUTPATIENT
Start: 2018-08-18 | End: 2018-08-19 | Stop reason: HOSPADM

## 2018-08-18 RX ORDER — FUROSEMIDE 40 MG/1
40 TABLET ORAL DAILY
COMMUNITY
End: 2019-10-24

## 2018-08-18 RX ORDER — POTASSIUM CHLORIDE 20MEQ/15ML
40 LIQUID (ML) ORAL PRN
Status: DISCONTINUED | OUTPATIENT
Start: 2018-08-18 | End: 2018-08-19 | Stop reason: HOSPADM

## 2018-08-18 RX ORDER — MAGNESIUM SULFATE 1 G/100ML
1 INJECTION INTRAVENOUS PRN
Status: DISCONTINUED | OUTPATIENT
Start: 2018-08-18 | End: 2018-08-19 | Stop reason: HOSPADM

## 2018-08-18 RX ORDER — NICOTINE 21 MG/24HR
1 PATCH, TRANSDERMAL 24 HOURS TRANSDERMAL DAILY PRN
Status: DISCONTINUED | OUTPATIENT
Start: 2018-08-18 | End: 2018-08-19 | Stop reason: HOSPADM

## 2018-08-18 RX ORDER — DEXTROSE MONOHYDRATE 50 MG/ML
100 INJECTION, SOLUTION INTRAVENOUS PRN
Status: DISCONTINUED | OUTPATIENT
Start: 2018-08-18 | End: 2018-08-19 | Stop reason: HOSPADM

## 2018-08-18 RX ADMIN — TRANEXAMIC ACID 1000 MG: 100 INJECTION, SOLUTION INTRAVENOUS at 17:53

## 2018-08-18 ASSESSMENT — ENCOUNTER SYMPTOMS
NAUSEA: 0
VOMITING: 0
SHORTNESS OF BREATH: 0

## 2018-08-18 NOTE — ED PROVIDER NOTES
FACULTY SIGN-OUT  ADDENDUM     Care of this patient was assumed from Dr Reece Estes. The patient was seen for Dental Problem (bleeding following tooth extraction 8/7/18)  . The patient's initial evaluation and plan have been discussed with the prior provider who initially evaluated the patient. Nursing Notes, Past Medical Hx, Past Surgical Hx, Social Hx, Allergies, and Family Hx were all reviewed. ED COURSE      The patient was given the following medications:  Orders Placed This Encounter   Medications    tranexamic acid (CYKLOKAPRON) 1,000 mg in dextrose 5 % 100 mL IVPB       Labs Reviewed   CBC WITH AUTO DIFFERENTIAL - Abnormal; Notable for the following:        Result Value    RBC 3.67 (*)     Hemoglobin 11.5 (*)     Hematocrit 34.8 (*)     Monocytes 12 (*)     All other components within normal limits   PROTIME-INR   APTT   Labs reviewed. RECENT VITALS:   Temp: 97.8 °F (36.6 °C), Pulse: 86, Resp: 16, BP: (!) 160/78    MEDICAL DECISION MAKING       Postop bleeding. Still bleeding after TX a and sutures. We will try a surgacel and I have spoken with Nimisha Corley for admission to [facility]. [place]     Impression: Postop bleeding after dental extraction    Disposition: Admit to Ant Eaton MD  Emergency Medicine Attending      Alana Paz MD  08/18/18 6812

## 2018-08-18 NOTE — ED NOTES
Attempted IV access x1 without success. Dr. Ivan Leong updated.       Venkat Ibarra, RN  08/18/18 3510

## 2018-08-18 NOTE — ED PROVIDER NOTES
[address]  Phone: [phone]        Pt Name: Leta Ellison  MRN: [id number]  [place] 2/12/1932  Date of evaluation: 8/18/18      CHIEF COMPLAINT       Chief Complaint   Patient presents with    Dental Problem     bleeding following tooth extraction 8/7/18         HISTORY OF PRESENT ILLNESS  (Location/Symptom, Timing/Onset, Context/Setting, Quality, Duration, Modifying Factors, Severity.)    Leta Ellison is a 80 y.o. female who presents With bleeding from a previous tooth extraction. The patient states she had a tooth extracted approximately a week and a half ago she was doing well. She had some slight bleeding from the area starting yesterday that stopped. She was eating oatmeal and then had bleeding once again. EMS was called. The patient was biting down on the gauze still has some oozing of blood from the left upper gumline. The patient denies being lightheaded or dizzy. No chest pain or shortness of breath no fever no chills. Nothing she does makes her symptoms worse. Direct pressure does slightly slow down the bleeding       REVIEW OF SYSTEMS    (2-9 systems for level 4, 10 or more for level 5)     Review of Systems   Constitutional: Negative for chills and fever. HENT: Positive for dental problem. Respiratory: Negative for shortness of breath. Gastrointestinal: Negative for nausea and vomiting. PAST MEDICAL HISTORY    has a past medical history of Atrial fibrillation ([address] CAD (coronary artery disease); CHF (congestive heart failure) ([address] COPD (chronic obstructive pulmonary disease) ([address] CVA (cerebral vascular accident) ([address] Esophageal reflux; Other and unspecified hyperlipidemia; Other primary cardiomyopathies; Type II or unspecified type diabetes mellitus without mention of complication, not stated as uncontrolled; Unspecified asthma(493.90); and Unspecified essential hypertension.     SURGICAL HISTORY      has a past surgical history that includes Coronary artery bypass graft and pacemaker placement. CURRENT MEDICATIONS       Current Discharge Medication List      CONTINUE these medications which have NOT CHANGED    Details   albuterol (PROVENTIL) (2.5 MG/3ML) 0.083% nebulizer solution Take 2.5 mg by nebulization every 6 hours as needed for Wheezing      furosemide (LASIX) 40 MG tablet Take 40 mg by mouth daily      magnesium oxide (MAG-OX) 400 MG tablet Take 400 mg by mouth daily      pseudoephedrine-guaiFENesin (MUCINEX D)  MG per extended release tablet Take 1 tablet by mouth every 12 hours      albuterol sulfate  (90 Base) MCG/ACT inhaler Inhale 2 puffs into the lungs every 6 hours as needed for Wheezing      carvedilol (COREG) 6.25 MG tablet Take 1 tablet by mouth 2 times daily  Qty: 180 tablet, Refills: 3      acetaminophen (TYLENOL) 325 MG tablet Take 2 tablets by mouth every 4 hours as needed for Fever  Qty: 120 tablet, Refills: 3      glimepiride (AMARYL) 2 MG tablet Take 1 tablet by mouth daily (with breakfast)  Qty: 30 tablet, Refills: 3      mometasone-formoterol (DULERA) 200-5 MCG/ACT inhaler Inhale 2 puffs into the lungs every 12 hours      budesonide-formoterol (SYMBICORT) 160-4.5 MCG/ACT AERO Inhale 2 puffs into the lungs 2 times daily      ketoconazole (NIZORAL) 2 % cream       omeprazole (PRILOSEC) 20 MG delayed release capsule       pravastatin (PRAVACHOL) 80 MG tablet       spironolactone (ALDACTONE) 25 MG tablet       fluticasone (FLONASE) 50 MCG/ACT nasal spray 1 spray by Nasal route daily      Calcium Carbonate-Vitamin D (CALCIUM + D PO) Take by mouth      docusate sodium (COLACE) 100 MG capsule Take 100 mg by mouth as needed for Constipation      Cholecalciferol (VITAMIN D3) 2000 UNITS CAPS Take by mouth             ALLERGIES     is allergic to Aníbal Schein tartrate]; amoxicillin; and metformin and related. FAMILY HISTORY     indicated that her mother is .  She indicated that her father is . family history includes Diabetes in her father. SOCIAL HISTORY      reports that she has never smoked. She has never used smokeless tobacco. She reports that she does not drink alcohol or use drugs. PHYSICAL EXAM    (up to 7 for level 4, 8 or more for level 5)   INITIAL VITALS:  height is 5' 3\" (1.6 m) and weight is 154 lb 11.2 oz (70.2 kg). Her oral temperature is 97.7 °F (36.5 °C). Her blood pressure is 122/67 and her pulse is 72. Her respiration is 16 and oxygen saturation is 96%. Physical Exam   Constitutional: She appears well-developed and well-nourished. HENT:   Head: Normocephalic and atraumatic. The patient is noted have bleeding from the left upper gumline where she had a previous tooth extraction   Eyes: Conjunctivae are normal.   Neck: Normal range of motion. Neck supple. Cardiovascular: Normal rate, regular rhythm and normal heart sounds. Pulmonary/Chest: Effort normal and breath sounds normal. No respiratory distress. She has no wheezes. She has no rales. Musculoskeletal: Normal range of motion. Neurological: She is alert. GCS of 15 with no focal deficits appreciated   Skin: Skin is warm and dry. No rash noted. Psychiatric: She has a normal mood and affect. Nursing note and vitals reviewed.       DIFFERENTIAL DIAGNOSIS/ MDM:     I will go ahead and apply TXA to the bleeding site and check some basic labs    DIAGNOSTIC RESULTS         LABS:  Results for orders placed or performed during the hospital encounter of 18   CBC Auto Differential   Result Value Ref Range    WBC 4.3 3.5 - 11.0 k/uL    RBC 3.67 (L) 4.0 - 5.2 m/uL    Hemoglobin 11.5 (L) 12.0 - 16.0 g/dL    Hematocrit 34.8 (L) 36 - 46 %    MCV 94.9 80 - 100 fL    MCH 31.3 26 - 34 pg    MCHC 33.0 31 - 37 g/dL    RDW 12.9 12.5 - 15.4 %    Platelets 211 649 - 659 k/uL    MPV 8.5 6.0 - 12.0 fL    NRBC Automated NOT REPORTED per 100 WBC    Differential Type NOT REPORTED     Seg Neutrophils

## 2018-08-18 NOTE — ED NOTES
Pt arrives to ED room 5 on stretcher via EMS. Pt transferred into cot by EMS. RN at bedside. Pt greeted, introductions made. Pt in NAD at this time, respirations even and non-labored. Pt presents with c/o bleeding following a tooth extraction 8/7/18. Pt states her incision opened last night and started bleeding. She reports the bleeding worsening through the day today, Pt states she is on blood thinners. She states she attempted to contact the doctor who performed the extraction without success. Pt states she felt light headed/dizziness earlier today but currently denies. Vitals and assessment obtained. Comfort offered.      Malaika Tirado RN  08/18/18 255[address] [facility]  08/18/18 0390

## 2018-08-18 NOTE — ED NOTES
Pt ambulates to bathroom without difficulty. C/o intermittent dizziness  Bleeding has increased. Dr. Reece Estes updated.       Nick Ryan RN  08/18/18 9895

## 2018-08-18 NOTE — ED NOTES
Alejo Marinelli, MD at bedside updating pt on results and plan of care.          Henry Chavira RN  08/18/18 0047

## 2018-08-19 ENCOUNTER — HOSPITAL ENCOUNTER (EMERGENCY)
Age: 83
Discharge: HOME | End: 2018-08-19
Attending: EMERGENCY MEDICINE
Payer: MEDICARE

## 2018-08-19 VITALS
HEART RATE: 72 BPM | WEIGHT: 154.7 LBS | RESPIRATION RATE: 16 BRPM | SYSTOLIC BLOOD PRESSURE: 122 MMHG | HEIGHT: 63 IN | TEMPERATURE: 97.7 F | DIASTOLIC BLOOD PRESSURE: 67 MMHG | BODY MASS INDEX: 27.41 KG/M2 | OXYGEN SATURATION: 96 %

## 2018-08-19 VITALS
TEMPERATURE: 97.5 F | HEART RATE: 96 BPM | WEIGHT: 160 LBS | BODY MASS INDEX: 28.35 KG/M2 | DIASTOLIC BLOOD PRESSURE: 96 MMHG | SYSTOLIC BLOOD PRESSURE: 119 MMHG | HEIGHT: 63 IN | RESPIRATION RATE: 18 BRPM | OXYGEN SATURATION: 97 %

## 2018-08-19 DIAGNOSIS — Z48.89 ENCOUNTER FOR POSTOPERATIVE WOUND CHECK: Primary | ICD-10-CM

## 2018-08-19 LAB
ANION GAP SERPL CALCULATED.3IONS-SCNC: 13 MMOL/L (ref 9–17)
BUN BLDV-MCNC: 13 MG/DL (ref 8–23)
BUN/CREAT BLD: 17 (ref 9–20)
CALCIUM SERPL-MCNC: 9.3 MG/DL (ref 8.6–10.4)
CHLORIDE BLD-SCNC: 100 MMOL/L (ref 98–107)
CO2: 27 MMOL/L (ref 20–31)
CREAT SERPL-MCNC: 0.75 MG/DL (ref 0.5–0.9)
ESTIMATED AVERAGE GLUCOSE: 186 MG/DL
GFR AFRICAN AMERICAN: >60 ML/MIN
GFR NON-AFRICAN AMERICAN: >60 ML/MIN
GFR SERPL CREATININE-BSD FRML MDRD: ABNORMAL ML/MIN/{1.73_M2}
GFR SERPL CREATININE-BSD FRML MDRD: ABNORMAL ML/MIN/{1.73_M2}
GLUCOSE BLD-MCNC: 109 MG/DL (ref 65–105)
GLUCOSE BLD-MCNC: 128 MG/DL (ref 70–99)
GLUCOSE BLD-MCNC: 133 MG/DL (ref 65–105)
GLUCOSE BLD-MCNC: 238 MG/DL (ref 65–105)
HBA1C MFR BLD: 8.1 % (ref 4–6)
HCT VFR BLD CALC: 33 % (ref 36–46)
HEMOGLOBIN: 10.9 G/DL (ref 12–16)
INR BLD: 1.2
MCH RBC QN AUTO: 31.3 PG (ref 26–34)
MCHC RBC AUTO-ENTMCNC: 33 G/DL (ref 31–37)
MCV RBC AUTO: 94.8 FL (ref 80–100)
NRBC AUTOMATED: ABNORMAL PER 100 WBC
PDW BLD-RTO: 13.1 % (ref 11.5–14.5)
PLATELET # BLD: 192 K/UL (ref 130–400)
PMV BLD AUTO: 8.1 FL (ref 6–12)
POTASSIUM SERPL-SCNC: 3.7 MMOL/L (ref 3.7–5.3)
PROTHROMBIN TIME: 11.9 SEC (ref 9.7–11.6)
RBC # BLD: 3.48 M/UL (ref 4–5.2)
SODIUM BLD-SCNC: 140 MMOL/L (ref 135–144)
WBC # BLD: 6.2 K/UL (ref 3.5–11)

## 2018-08-19 PROCEDURE — 82947 ASSAY GLUCOSE BLOOD QUANT: CPT

## 2018-08-19 PROCEDURE — G8978 MOBILITY CURRENT STATUS: HCPCS

## 2018-08-19 PROCEDURE — 85610 PROTHROMBIN TIME: CPT

## 2018-08-19 PROCEDURE — 97116 GAIT TRAINING THERAPY: CPT

## 2018-08-19 PROCEDURE — 85027 COMPLETE CBC AUTOMATED: CPT

## 2018-08-19 PROCEDURE — 6370000000 HC RX 637 (ALT 250 FOR IP): Performed by: NURSE PRACTITIONER

## 2018-08-19 PROCEDURE — 97530 THERAPEUTIC ACTIVITIES: CPT

## 2018-08-19 PROCEDURE — 97110 THERAPEUTIC EXERCISES: CPT

## 2018-08-19 PROCEDURE — 94640 AIRWAY INHALATION TREATMENT: CPT

## 2018-08-19 PROCEDURE — G0378 HOSPITAL OBSERVATION PER HR: HCPCS

## 2018-08-19 PROCEDURE — 83036 HEMOGLOBIN GLYCOSYLATED A1C: CPT

## 2018-08-19 PROCEDURE — 36415 COLL VENOUS BLD VENIPUNCTURE: CPT

## 2018-08-19 PROCEDURE — G8979 MOBILITY GOAL STATUS: HCPCS

## 2018-08-19 PROCEDURE — 6360000002 HC RX W HCPCS: Performed by: NURSE PRACTITIONER

## 2018-08-19 PROCEDURE — 80048 BASIC METABOLIC PNL TOTAL CA: CPT

## 2018-08-19 PROCEDURE — 99239 HOSP IP/OBS DSCHRG MGMT >30: CPT | Performed by: INTERNAL MEDICINE

## 2018-08-19 PROCEDURE — 99283 EMERGENCY DEPT VISIT LOW MDM: CPT

## 2018-08-19 PROCEDURE — 97162 PT EVAL MOD COMPLEX 30 MIN: CPT

## 2018-08-19 PROCEDURE — 2580000003 HC RX 258: Performed by: NURSE PRACTITIONER

## 2018-08-19 RX ORDER — PANTOPRAZOLE SODIUM 40 MG/1
40 TABLET, DELAYED RELEASE ORAL
Status: DISCONTINUED | OUTPATIENT
Start: 2018-08-19 | End: 2018-08-19 | Stop reason: HOSPADM

## 2018-08-19 RX ORDER — CARVEDILOL 6.25 MG/1
6.25 TABLET ORAL 2 TIMES DAILY
Status: DISCONTINUED | OUTPATIENT
Start: 2018-08-19 | End: 2018-08-19 | Stop reason: HOSPADM

## 2018-08-19 RX ORDER — SPIRONOLACTONE 25 MG/1
25 TABLET ORAL DAILY
Status: DISCONTINUED | OUTPATIENT
Start: 2018-08-19 | End: 2018-08-19 | Stop reason: HOSPADM

## 2018-08-19 RX ORDER — FUROSEMIDE 40 MG/1
40 TABLET ORAL DAILY
Status: DISCONTINUED | OUTPATIENT
Start: 2018-08-19 | End: 2018-08-19 | Stop reason: HOSPADM

## 2018-08-19 RX ORDER — ONDANSETRON 2 MG/ML
4 INJECTION INTRAMUSCULAR; INTRAVENOUS EVERY 6 HOURS PRN
Status: DISCONTINUED | OUTPATIENT
Start: 2018-08-19 | End: 2018-08-19 | Stop reason: HOSPADM

## 2018-08-19 RX ORDER — PRAVASTATIN SODIUM 40 MG
80 TABLET ORAL NIGHTLY
Status: DISCONTINUED | OUTPATIENT
Start: 2018-08-19 | End: 2018-08-19 | Stop reason: HOSPADM

## 2018-08-19 RX ORDER — DOCUSATE SODIUM 100 MG/1
100 CAPSULE, LIQUID FILLED ORAL PRN
Status: DISCONTINUED | OUTPATIENT
Start: 2018-08-18 | End: 2018-08-19 | Stop reason: HOSPADM

## 2018-08-19 RX ORDER — ACETAMINOPHEN 325 MG/1
650 TABLET ORAL EVERY 4 HOURS PRN
Status: DISCONTINUED | OUTPATIENT
Start: 2018-08-18 | End: 2018-08-19 | Stop reason: SDUPTHER

## 2018-08-19 RX ORDER — ONDANSETRON 4 MG/1
4 TABLET, ORALLY DISINTEGRATING ORAL EVERY 6 HOURS PRN
Status: DISCONTINUED | OUTPATIENT
Start: 2018-08-19 | End: 2018-08-19 | Stop reason: HOSPADM

## 2018-08-19 RX ORDER — FLUTICASONE PROPIONATE 50 MCG
1 SPRAY, SUSPENSION (ML) NASAL DAILY
Status: DISCONTINUED | OUTPATIENT
Start: 2018-08-19 | End: 2018-08-19 | Stop reason: HOSPADM

## 2018-08-19 RX ADMIN — Medication 10 ML: at 07:52

## 2018-08-19 RX ADMIN — INSULIN LISPRO 4 UNITS: 100 INJECTION, SOLUTION INTRAVENOUS; SUBCUTANEOUS at 11:58

## 2018-08-19 RX ADMIN — ACETAMINOPHEN 650 MG: 325 TABLET ORAL at 05:50

## 2018-08-19 RX ADMIN — CARVEDILOL 6.25 MG: 6.25 TABLET, FILM COATED ORAL at 07:47

## 2018-08-19 RX ADMIN — SPIRONOLACTONE 25 MG: 25 TABLET ORAL at 07:48

## 2018-08-19 RX ADMIN — ALBUTEROL SULFATE 2.5 MG: 2.5 SOLUTION RESPIRATORY (INHALATION) at 14:56

## 2018-08-19 RX ADMIN — MOMETASONE FUROATE AND FORMOTEROL FUMARATE DIHYDRATE 2 PUFF: 200; 5 AEROSOL RESPIRATORY (INHALATION) at 06:12

## 2018-08-19 RX ADMIN — MAGNESIUM OXIDE TAB 400 MG (241.3 MG ELEMENTAL MG) 400 MG: 400 (241.3 MG) TAB at 07:47

## 2018-08-19 RX ADMIN — PANTOPRAZOLE SODIUM 40 MG: 40 TABLET, DELAYED RELEASE ORAL at 05:51

## 2018-08-19 RX ADMIN — FUROSEMIDE 40 MG: 40 TABLET ORAL at 07:48

## 2018-08-19 RX ADMIN — ALBUTEROL SULFATE 2.5 MG: 2.5 SOLUTION RESPIRATORY (INHALATION) at 06:11

## 2018-08-19 ASSESSMENT — PAIN DESCRIPTION - LOCATION
LOCATION: LEG
LOCATION: LEG

## 2018-08-19 ASSESSMENT — PAIN DESCRIPTION - ORIENTATION
ORIENTATION: RIGHT;LEFT
ORIENTATION: RIGHT;LEFT

## 2018-08-19 ASSESSMENT — PAIN DESCRIPTION - PAIN TYPE
TYPE: CHRONIC PAIN
TYPE: CHRONIC PAIN

## 2018-08-19 ASSESSMENT — PAIN SCALES - GENERAL
PAINLEVEL_OUTOF10: 8
PAINLEVEL_OUTOF10: 2
PAINLEVEL_OUTOF10: 3

## 2018-08-19 ASSESSMENT — PAIN DESCRIPTION - FREQUENCY: FREQUENCY: CONTINUOUS

## 2018-08-19 ASSESSMENT — PAIN DESCRIPTION - PROGRESSION: CLINICAL_PROGRESSION: NOT CHANGED

## 2018-08-19 ASSESSMENT — PAIN DESCRIPTION - ONSET: ONSET: ON-GOING

## 2018-08-19 ASSESSMENT — PAIN DESCRIPTION - DESCRIPTORS: DESCRIPTORS: ACHING;OTHER (COMMENT)

## 2018-08-19 NOTE — H&P
drinking, increase in urination, hot or cold intolerance. MUSCULOSKELETAL: Negative joint pains, muscle aches, swelling of joints. NEUROLOGICAL: Positive for dizziness, lightheadedness upon standing, negative for headaches, numbness, pain, tingling extremities. BEHAVIOR/PSYCH: Negative for depression, anxiety. Physical Exam:   /68   Pulse 82   Temp 98.4 °F (36.9 °C) (Oral)   Resp 16   Ht 5' 3\" (1.6 m)   Wt 158 lb (71.7 kg)   SpO2 98%   BMI 27.99 kg/m²   Temp (24hrs), Av.1 °F (36.7 °C), Min:97.8 °F (36.6 °C), Max:98.4 °F (36.9 °C)    No results for input(s): POCGLU in the last 72 hours. No intake or output data in the 24 hours ending 189    General Appearance: Alert, well appearing, and in no acute distress. Mental status: Oriented to person, place, and time with normal affect. Head:  Normocephalic, atraumatic. Eye: No icterus, redness, pupils equal and reactive, extraocular eye movements intact, conjunctiva clear. Ear: Normal external ear, no discharge, hearing intact. Nose:  No drainage noted. Mouth: Mucous membranes moist. Bleeding from previous tooth extraction site in left upper mouth, one suture intact. Neck: Supple, no carotid bruits, thyroid not palpable. Lungs: Bilateral equal air entry, clear to ausculation, no wheezing, rales or rhonchi, normal effort. Cardiovascular: Regular rate, irregularly irregular, no murmur, gallop, rub. Abdomen: Soft, nontender, nondistended, normal bowel sounds, no hepatomegaly or splenomegaly. Neurologic: There are no new focal motor or sensory deficits, normal muscle tone and bulk, no abnormal sensation, normal speech, cranial nerves II through XII grossly intact. Skin: No gross lesions, rashes, bruising or bleeding on exposed skin area  Extremities:  Peripheral pulses palpable, no calf pain with palpation. +1 nonpitting edema and vascular discoloration to bilateral lower extremities. Psych: Normal affect.     Investigations: Laboratory Testing:  Recent Results (from the past 24 hour(s))   CBC Auto Differential    Collection Time: 08/18/18  5:44 PM   Result Value Ref Range    WBC 4.3 3.5 - 11.0 k/uL    RBC 3.67 (L) 4.0 - 5.2 m/uL    Hemoglobin 11.5 (L) 12.0 - 16.0 g/dL    Hematocrit 34.8 (L) 36 - 46 %    MCV 94.9 80 - 100 fL    MCH 31.3 26 - 34 pg    MCHC 33.0 31 - 37 g/dL    RDW 12.9 12.5 - 15.4 %    Platelets 335 261 - 586 k/uL    MPV 8.5 6.0 - 12.0 fL    NRBC Automated NOT REPORTED per 100 WBC    Differential Type NOT REPORTED     Seg Neutrophils 50 36 - 66 %    Lymphocytes 34 24 - 44 %    Monocytes 12 (H) 2 - 11 %    Eosinophils % 3 1 - 4 %    Basophils 1 0 - 2 %    Immature Granulocytes NOT REPORTED 0 %    Segs Absolute 2.10 1.8 - 7.7 k/uL    Absolute Lymph # 1.40 1.0 - 4.8 k/uL    Absolute Mono # 0.50 0.1 - 1.2 k/uL    Absolute Eos # 0.10 0.0 - 0.4 k/uL    Basophils # 0.10 0.0 - 0.2 k/uL    Absolute Immature Granulocyte NOT REPORTED 0.00 - 0.30 k/uL    WBC Morphology NOT REPORTED     RBC Morphology NOT REPORTED     Platelet Estimate NOT REPORTED    Protime-INR    Collection Time: 08/18/18  5:44 PM   Result Value Ref Range    Protime 11.1 9.4 - 12.6 sec    INR 1.1    APTT    Collection Time: 08/18/18  5:44 PM   Result Value Ref Range    PTT 28.5 21.3 - 31.3 sec       Imaging/Diagnostics:    None. Assessment :      Primary Problem  Postoperative or surgical complication, initial encounter    Active Hospital Problems    Diagnosis Date Noted    Postoperative or surgical complication, initial encounter [T81. 9XXA] 08/18/2018    Diabetes mellitus type 2, diet-controlled ([facility] [E11.9] 08/18/2018    Bleeding [R58]     COPD (chronic obstructive pulmonary disease) (HCC) [J44.9]     Normocytic normochromic anemia [D64.9] 06/26/2017    Mild mitral regurgitation [I34.0] 09/22/2016    Atrial fibrillation ([address] [I48.91] 06/18/2015    Pacemaker [Z95.0] 06/18/2015    Hypertension [I10] 06/18/2015    CAD (coronary artery disease) [I25.10] 06/18/2015    Hyperlipidemia [E78.5] 06/18/2015    H/O: CVA (cerebrovascular accident) [Z86.73] 06/18/2015    Hx of CABG [Z95.1] 06/18/2015       Plan:     Patient status Admit as inpatient to the medical surgical unit. 1. Admit as inpatient. 2. Monitor vitals. 3. Monitor labs. 4. Control bleeding. 5. GI prophylaxis. 6. Hold blood thinners for now. 7. EPC cuffs. 8. Monitor and control blood glucose. 9. Diabetic diet. 10. Activity with assist.  11. PT/OT. Consultations:   None    Patient is admitted as inpatient status because of co-morbidities listed above, severity of signs and symptoms as outlined, requirement for current medical therapies and most importantly because of direct risk to patient if care not provided in a hospital setting.     Mita Medina, DILCIA - CNP  8/18/2018  10:39 PM    Copy sent to Dr. Tawanna Velasquez MD

## 2018-08-19 NOTE — ED NOTES
Spoke to access regarding bed assignment for pt at [facility] [address].      Malaika Tirado RN  08/18/18 3373

## 2018-08-19 NOTE — ED NOTES
Moderate amt of bleeding continues, gauze changed as needed. MD aware.       Evangelista Leger, BENJAMIN  08/18/18 0229

## 2018-08-19 NOTE — PROGRESS NOTES
stike  Stairs/Curb  Stairs?: No     Balance  Posture: Fair (kyphosis)  Sitting - Static: Good  Sitting - Dynamic: Good  Standing - Static: Fair  Standing - Dynamic: Fair;-  Exercises  Comments: Pt instructed in B LE seated leg program x 15 reps AROM in all planes for improved standing tolerance. Assessment   Body structures, Functions, Activity limitations: Decreased functional mobility ; Decreased balance;Decreased coordination;Decreased endurance;Decreased strength;Decreased sensation  Prognosis: Good  Decision Making: Medium Complexity  History: lives alone, hx CVA with residual weakness  Exam: LE weakness, difficulty with transfers, mild unsteadiness with gait  Clinical Presentation: evolving due to recent medical complications causing hospitalization  Patient Education: PT POC, LE ther ex  Barriers to Learning: none  REQUIRES PT FOLLOW UP: Yes  Activity Tolerance  Activity Tolerance: Patient limited by endurance; Patient Tolerated treatment well         Plan   Plan  Times per week: 1-2x/day for 3-5x/wk  Current Treatment Recommendations: Strengthening, Transfer Training, Endurance Training, Neuromuscular Re-education, Balance Training, Gait Training, Patient/Caregiver Education & Training  Safety Devices  Type of devices: Patient at risk for falls, Left in chair, Gait belt  Restraints  Initially in place: No    G-Code  PT G-Codes  Functional Assessment Tool Used: [place] AM-PAC without stair climbing  Score: 17  Functional Limitation: Mobility: Walking and moving around  Mobility: Walking and Moving Around Current Status ():  At least 20 percent but less than 40 percent impaired, limited or restricted  Mobility: Walking and Moving Around Goal Status (): 0 percent impaired, limited or restricted  OutComes Score                                           AM-PAC Score     AM-PAC Inpatient Mobility without Stair Climbing Raw Score : 17  AM-PAC Inpatient without Stair Climbing T-Scale Score :

## 2018-08-19 NOTE — PROGRESS NOTES
Pt discharged to home per wheelchair (son- Alyx Loo) in stable condition with belongings  Discharge instructions given  Discharge checklist reviewed and completed with pt and family.   Pt denies having any further questions at this time

## 2018-08-19 NOTE — PROGRESS NOTES
thinners prior to the extraction. She denies additional contributory or alleviating factors.      She sees Dr. Dakotah Trinidad for cardiology. \"    Review of Systems:     Negative except for those highlighted:    CONSTITUTIONAL:  fevers, chills, sweats, fatigue, weight loss, generalized weakness  HEENT:  Vision changes, hearing changes, runny nose, throat pain  RESPIRATORY:  shortness of breath, cough, congestion, wheezing. CARDIOVASCULAR:  chest pain, palpitations  GASTROINTESTINAL:  nausea, vomiting, diarrhea, constipation, change in bowel habits, abdominal pain   GENITOURINARY:  difficulty of urination, burning with urination, frequency   INTEGUMENT:  rash, skin lesions, easy bruising   HEMATOLOGIC/LYMPHATIC:  swelling/edema   ALLERGIC/IMMUNOLOGIC:  urticaria , itching  ENDOCRINE:  increase in drinking, increase in urination, hot or cold intolerance  MUSCULOSKELETAL:  joint pains, muscle aches, swelling of joints  NEUROLOGICAL:  headaches, dizziness, lightheadedness, numbness, pain, tingling extremities  BEHAVIOR/PSYCH:  depression, anxiety    Medications: Allergies:     Allergies   Allergen Reactions    Ambien [Zolpidem Tartrate]     Amoxicillin     Metformin And Related        Current Meds:   Scheduled Meds:    mometasone-formoterol  2 puff Inhalation BID    carvedilol  6.25 mg Oral BID    fluticasone  1 spray Nasal Daily    furosemide  40 mg Oral Daily    magnesium oxide  400 mg Oral Daily    pravastatin  80 mg Oral Nightly    pantoprazole  40 mg Oral QAM AC    spironolactone  25 mg Oral Daily    sodium chloride flush  10 mL Intravenous 2 times per day    insulin lispro  0-12 Units Subcutaneous TID WC    insulin lispro  0-6 Units Subcutaneous Nightly     Continuous Infusions:    dextrose       PRN Meds: docusate sodium, ondansetron **OR** ondansetron, sodium chloride flush, potassium chloride **OR** potassium chloride **OR** potassium chloride, magnesium sulfate, magnesium hydroxide, bisacodyl, REPORTED 10/01/2017 04:50 PM     Lab Results   Component Value Date/Time    CULTURE NO SIGNIFICANT GROWTH 10/01/2017 04:50 PM    CULTURE  10/01/2017 04:50 PM     Performed at [facility], [address] [place] [address] [phone]       No results found for: POCPH, PHART, PH, POCPCO2, KFT0SRV, PCO2, POCPO2, PO2ART, PO2, POCHCO3, AXW1OVQ, HCO3, NBEA, PBEA, BEART, BE, THGBART, THB, ZWT3XFB, CGQM3DIM, A0KVOIVI, O2SAT, FIO2    Radiology:    Physical Examination:        /67   Pulse 72   Temp 97.7 °F (36.5 °C) (Oral)   Resp 16   Ht 5' 3\" (1.6 m)   Wt 154 lb 11.2 oz (70.2 kg)   SpO2 96%   BMI 27.40 kg/m²   Temp (24hrs), Av.7 °F (36.5 °C), Min:97.3 °F (36.3 °C), Max:98.4 °F (36.9 °C)    Recent Labs      18   0635   POCGLU  133*       Intake/Output Summary (Last 24 hours) at 18 0829  Last data filed at 18 0610   Gross per 24 hour   Intake                0 ml   Output              700 ml   Net             -700 ml       General Appearance:  alert, well appearing, and in no acute distress  Mental status: oriented to person, place, and time with normal affect  Head:  normocephalic, atraumatic. Eye: no icterus, redness, pupils equal and reactive, extraocular eye movements intact, conjunctiva clear  Ear: normal external ear, no discharge, hearing intact  Nose:  no drainage noted  Mouth: Left upper gum area at site of extraction with suture. No active bleeding at this time. Neck: supple, no carotid bruits, thyroid not palpable  Lungs: Bilateral equal air entry, clear to ausculation, no wheezing, rales or rhonchi, normal effort  Cardiovascular: normal rate, regular rhythm, no murmur, gallop, rub.   Abdomen: Soft, nontender, nondistended, normal bowel sounds, no hepatomegaly or splenomegaly  Neurologic: There are no new focal motor or sensory deficits, normal muscle tone and bulk, no abnormal sensation, normal speech, cranial nerves II through XII grossly intact  Skin: No gross lesions, rashes, bruising or bleeding on exposed skin area  Extremities:  peripheral pulses palpable, no pedal edema or calf pain with palpation  Psych: normal affect      Assessment:        Principal Problem:    Postoperative or surgical complication, initial encounter  Active Problems:    Atrial fibrillation (HCC)    Pacemaker    Hypertension    CAD (coronary artery disease)    Hyperlipidemia    Hx of CABG    H/O: CVA (cerebrovascular accident)    Mild mitral regurgitation    Normocytic normochromic anemia    COPD (chronic obstructive pulmonary disease) (HCC)    Diabetes mellitus type 2, diet-controlled ([address]    Bleeding  Resolved Problems:    * No resolved hospital problems. *      Plan:        Principal Problem:    Postoperative or surgical complication, initial encounter  Active Problems:    Atrial fibrillation (HCC)    Pacemaker    Hypertension    CAD (coronary artery disease)    Hyperlipidemia    Hx of CABG    H/O: CVA (cerebrovascular accident)    Mild mitral regurgitation    Normocytic normochromic anemia    COPD (chronic obstructive pulmonary disease) (HCC)    Diabetes mellitus type 2, diet-controlled (HCC)    Bleeding  Resolved Problems:    * No resolved hospital problems. *    Bleeding at site of tooth extraction, likely 2/2 coagulopathy due to Eliquis use. Patient came in with unrelenting bleeding. Per patient, she had the dental extraction done on 8/7. She held her Eliquis 3 days prior and two days post extraction. Then she noted bleeding this past week. She feel she may have started a regular diet too soon. Bleeding would not stop at home, despite pressure, so she presented to ED. Site sutured. Eliquis held. No further bleeding at this time. Hgb stable. VSS. Okay for discharge but she has been advised to hold Eliquis for now and she has been advised to f/u with her cardiologist Dr. Shlomo Hughes to determine the best time to resume Eliquis therapy.  She has been advised to stick to a largely liquid/soft diet for the

## 2018-08-19 NOTE — CARE COORDINATION
Case Management Initial Discharge Plan  Odalys Kohler,         Readmission Risk              Risk of Unplanned Readmission:        13               Met with:patient to discuss discharge plans. Information verified: address, contacts, phone number, , insurance Yes  PCP: Dariel Pathak MD  Date of last visit: 2018    Insurance Provider: [employer] Carrillo Medicare    Discharge Planning  Current Residence:  [facility] living  Living Arrangements:  [place] has 1 stories/1 stairs to climb  Support Systems:  Children  Current Services PTA:  None Agency: Previous [facility]  Patient able to perform ADL's:Assisted  DME in home:  Walker, cane, shower chair, nebulizer, glucometer, alert buttong  DME used to aid ambulation prior to admission:   Intermittent use of walker  DME used during admission:      Potential Assistance Needed:  N/A    Pharmacy: [facility] [address] Medications:  No  Does patient want to participate in local refill/ meds to beds program?  Yes    Patient agreeable to home care: No  [place] of choice provided:  n/a      Type of Home Care Services:  None  Patient expects to be discharged to:  home    Prior SNF/Rehab Placement and Facility: [facility]  Agreeable to SNF/Rehab: No  [place] of choice provided: n/a   Evaluation: n/a    Expected Discharge date:  18  Follow Up Appointment: Best Day/ Time:      Transportation provider: family  Transportation arrangements needed for discharge: No    Discharge Plan:   Met with patient to review discharge needs. Pt denies any need and does not want home care or therapy. She feels ready to go home and her oral bleeding has stopped. She knows to call her PCP if her needs change and she needs service set up.         Electronically signed by Maggie Bolivar on 18 at 3:11 PM

## 2018-08-19 NOTE — PROGRESS NOTES
[facility], RCPPatient Assessment complete. Postoperative or surgical complication, initial encounter [T81. 9XXA]  Postoperative or surgical complication, initial encounter [T81. 9XXA] . Vitals:    08/18/18 2247   BP: 137/88   Pulse: 80   Resp: 16   Temp: 97.3 °F (36.3 °C)   SpO2: 99%   . Patients home meds are   Prior to Admission medications    Medication Sig Start Date End Date Taking?  Authorizing Provider   albuterol (PROVENTIL) (2.5 MG/3ML) 0.083% nebulizer solution Take 2.5 mg by nebulization every 6 hours as needed for Wheezing   Yes Historical Provider, MD   furosemide (LASIX) 40 MG tablet Take 40 mg by mouth daily   Yes Historical Provider, MD   magnesium oxide (MAG-OX) 400 MG tablet Take 400 mg by mouth daily   Yes Historical Provider, MD   pseudoephedrine-guaiFENesin ([address])  MG per extended release tablet Take 1 tablet by mouth every 12 hours   Yes Historical Provider, MD   albuterol sulfate  (90 Base) MCG/ACT inhaler Inhale 2 puffs into the lungs every 6 hours as needed for Wheezing   Yes Historical Provider, MD   carvedilol (COREG) 6.25 MG tablet Take 1 tablet by mouth 2 times daily 2/7/18   DILCIA Delarosa - CNP   acetaminophen (TYLENOL) 325 MG tablet Take 2 tablets by mouth every 4 hours as needed for Fever 10/5/17   Allan Roth, DO   glimepiride (AMARYL) 2 MG tablet Take 1 tablet by mouth daily (with breakfast) 10/5/17   Allan Roth, DO   mometasone-formoterol (DULERA) 200-5 MCG/ACT inhaler Inhale 2 puffs into the lungs every 12 hours    Historical Provider, MD   budesonide-formoterol (SYMBICORT) 160-4.5 MCG/ACT AERO Inhale 2 puffs into the lungs 2 times daily    Historical Provider, MD   ketoconazole (NIZORAL) 2 % cream  7/10/16   Historical Provider, MD   omeprazole (PRILOSEC) 20 MG delayed release capsule  8/23/16   Historical Provider, MD   pravastatin (PRAVACHOL) 80 MG tablet  8/23/16   Historical Provider, MD   spironolactone (ALDACTONE) 25 MG tablet  8/23/16 Historical Provider, MD   fluticasone (FLONASE) 50 MCG/ACT nasal spray 1 spray by Nasal route daily    Historical Provider, MD   ELIQUIS 5 MG TABS tablet TAKE (1) TABLET EVERY MORNING AND EVENING 3/30/16   Dayana Escalona MD   Calcium Carbonate-Vitamin D (CALCIUM + D PO) Take by mouth    Historical Provider, MD   docusate sodium (COLACE) 100 MG capsule Take 100 mg by mouth as needed for Constipation    Historical Provider, MD   Cholecalciferol (VITAMIN D3) 2000 UNITS CAPS Take by mouth    Historical Provider, MD   .  Recent Surgical History: None = 0     Assessment                   FEV1/FVC  Patient asleep at this time    FEV1 Predicted na      FEV1 na    FEV1 % Predicted na  FVC na  IS volume na  IBW na  FIO2% room air  SPO2 97%  RR 16  Breath Sounds: clear per CNP      · Bronchodilator assessment at level  1  · Hyperinflation assessment at level   · Secretion Management assessment at level    ·   · []    Bronchodilator Assessment  BRONCHODILATOR ASSESSMENT SCORE  Score 0 1 2 3 4 5   Breath Sounds   []  Patient Baseline [x]  No Wheeze good aeration []  Faint, scattered wheezing, good aeration []  Expiratory Wheezing and or moderately diminished []  Insp/Exp wheeze and/or very diminished []  Insp/Exp and/ or marked distress   Respiratory Rate   []  Patient Baseline [x]  Less than 20 [x]  Less than 20 []  20-25 []  Greater than 25 []  Greater than 25   Peak flow % of Pred or PB [x]  NA   []  Greater than 90%  []  81-90% []  71-80% []  Less than or equal to 70%  or unable to perform []  Unable due to Respiratory Distress   Dyspnea re []  Patient Baseline [x]  No SOB [x]  No SOB []  SOB on exertion []  SOB min activity []  At rest/acute   e FEV% Predicted       [x]  NA []  Above 69%  []  Unable []  Above 60-69%  []  Unable []  Above 50-59%  []  Unable []  Above 35-49%  []  Unable []  Less than 35%  []  Unable                 []  Hyperinflation Assessment  Score 1 2 3   CXR and Breath Sounds   []  Clear []  No atelectasis  Basilar aeration []  Atelectasis or absent basilar breath sounds   Incentive Spirometry Volume  (Per IBW)   []  Greater than or equal to 15ml/Kg []  less than 15ml/Kg []  less than 15ml/Kg   Surgery within last 2 weeks []  None or general   []  Abdominal or thoracic surgery  []  Abdominal or thoracic   Chronic Pulmonary Historyre []  No []  Yes []  Yes     []  Secretion Management Assessment  Score 1 2 3   Bilateral Breath Sounds   []  Occasional Rhonchi []  Scattered Rhonchi []  Course Rhonchi and/or poor aeration   Sputum    []  Small amount of thin secretions []  Moderate amount of viscous secretions []  Copius, Viscious Yellow/ Secretions   CXR as reported by physician []  clear  []  Unavailable []  Infiltrates and/or consolidation  []  Unavailable []  Mucus Plugging and or lobar consolidation  []  Unavailable   Cough []  Strong, productive cough []  Weak productive cough []  No cough or weak non-productive cough

## 2018-09-04 ENCOUNTER — HOSPITAL ENCOUNTER (EMERGENCY)
Facility: CLINIC | Age: 83
Discharge: HOME | End: 2018-09-04
Attending: EMERGENCY MEDICINE
Payer: MEDICARE

## 2018-09-04 VITALS
OXYGEN SATURATION: 97 % | RESPIRATION RATE: 18 BRPM | BODY MASS INDEX: 28.34 KG/M2 | TEMPERATURE: 98.4 F | DIASTOLIC BLOOD PRESSURE: 75 MMHG | HEART RATE: 80 BPM | SYSTOLIC BLOOD PRESSURE: 155 MMHG | WEIGHT: 160 LBS

## 2018-09-04 DIAGNOSIS — S81.811A NONINFECTED SKIN TEAR OF LEG, RIGHT, INITIAL ENCOUNTER: Primary | ICD-10-CM

## 2018-09-04 PROCEDURE — 6370000000 HC RX 637 (ALT 250 FOR IP)

## 2018-09-04 PROCEDURE — 90715 TDAP VACCINE 7 YRS/> IM: CPT | Performed by: EMERGENCY MEDICINE

## 2018-09-04 PROCEDURE — 90471 IMMUNIZATION ADMIN: CPT | Performed by: EMERGENCY MEDICINE

## 2018-09-04 PROCEDURE — 6370000000 HC RX 637 (ALT 250 FOR IP): Performed by: EMERGENCY MEDICINE

## 2018-09-04 PROCEDURE — 99283 EMERGENCY DEPT VISIT LOW MDM: CPT

## 2018-09-04 PROCEDURE — 12001 RPR S/N/AX/GEN/TRNK 2.5CM/<: CPT

## 2018-09-04 PROCEDURE — 6360000002 HC RX W HCPCS: Performed by: EMERGENCY MEDICINE

## 2018-09-04 RX ORDER — ACETAMINOPHEN 325 MG/1
650 TABLET ORAL ONCE
Status: COMPLETED | OUTPATIENT
Start: 2018-09-04 | End: 2018-09-04

## 2018-09-04 RX ADMIN — Medication 1 EACH: at 17:55

## 2018-09-04 RX ADMIN — Medication 1 EACH: at 17:45

## 2018-09-04 RX ADMIN — ACETAMINOPHEN 650 MG: 325 TABLET ORAL at 19:00

## 2018-09-04 RX ADMIN — TETANUS TOXOID, REDUCED DIPHTHERIA TOXOID AND ACELLULAR PERTUSSIS VACCINE, ADSORBED 0.5 ML: 5; 2.5; 8; 8; 2.5 SUSPENSION INTRAMUSCULAR at 18:04

## 2018-09-04 ASSESSMENT — PAIN DESCRIPTION - ORIENTATION: ORIENTATION: RIGHT;LOWER

## 2018-09-04 ASSESSMENT — PAIN DESCRIPTION - LOCATION: LOCATION: LEG

## 2018-09-04 ASSESSMENT — PAIN SCALES - GENERAL
PAINLEVEL_OUTOF10: 10

## 2018-09-04 ASSESSMENT — PAIN DESCRIPTION - PROGRESSION: CLINICAL_PROGRESSION: NOT CHANGED

## 2018-09-04 ASSESSMENT — PAIN DESCRIPTION - PAIN TYPE: TYPE: ACUTE PAIN

## 2018-09-04 NOTE — ED NOTES
Dr. Rica Gonzalez in room, uses two silver nitrate sticks, bleeding is controlled.  Pressure dressing applied and ACE applied, PMS intact post application     John Power RN  09/04/18 [id number]

## 2018-09-04 NOTE — ED PROVIDER NOTES
and are stable for discharge with outpatient follow-up. The plan has been discussed in detail and they are aware of the specific conditions for emergent return, as well as the importance of follow-up    I have reviewed the disposition diagnosis with the patient and or their family/guardian. I have answered their questions and given discharge instructions. They voiced understanding of these instructions and did not have any further questions or complaints. PROCEDURES:  The patient has her wound cleansed, dried, there are 2 areas of oozing blood. Using a silver nitrate stick. These were cauterized. Then using one half inch Steri-Strips. The skin tear is then pulled back together with good approximation of the wound edges no further active bleeding appreciated. A dressing was then applied to the area    FINAL IMPRESSION      1. Noninfected skin tear of leg, right, initial encounter          DISPOSITION/PLAN   DISPOSITION        CONDITION ON DISPOSITION:   Improved - Stable    PATIENT REFERRED TO:  Frank Martinez MD  [address] [place] [id number]  [phone]    Call in 1 day        DISCHARGE MEDICATIONS:  New Prescriptions    No medications on file       (Please note that portions of this note were completed with a voice recognition program.  Efforts were made to edit the dictations but occasionally words are mis-transcribed.)    Martínez MD, F.A.C.E.P.   Attending Emergency Medicine Physician       Fred Weinberg MD  09/04/18 3682

## 2018-09-24 ENCOUNTER — HOSPITAL ENCOUNTER (EMERGENCY)
Age: 83
Discharge: HOME | End: 2018-09-24
Payer: MEDICARE

## 2018-09-24 VITALS
OXYGEN SATURATION: 98 % | BODY MASS INDEX: 27.64 KG/M2 | SYSTOLIC BLOOD PRESSURE: 140 MMHG | HEART RATE: 87 BPM | DIASTOLIC BLOOD PRESSURE: 93 MMHG | HEIGHT: 63 IN | WEIGHT: 156 LBS | RESPIRATION RATE: 16 BRPM | TEMPERATURE: 97.7 F

## 2018-09-24 DIAGNOSIS — S81.801A LEG WOUND, RIGHT, INITIAL ENCOUNTER: Primary | ICD-10-CM

## 2018-09-24 LAB
ABSOLUTE EOS #: 0.1 K/UL (ref 0–0.4)
ABSOLUTE IMMATURE GRANULOCYTE: ABNORMAL K/UL (ref 0–0.3)
ABSOLUTE LYMPH #: 1.2 K/UL (ref 1–4.8)
ABSOLUTE MONO #: 0.6 K/UL (ref 0.2–0.8)
ANION GAP SERPL CALCULATED.3IONS-SCNC: 13 MMOL/L (ref 9–17)
BASOPHILS # BLD: 1 % (ref 0–2)
BASOPHILS ABSOLUTE: 0.1 K/UL (ref 0–0.2)
BUN BLDV-MCNC: 11 MG/DL (ref 8–23)
BUN/CREAT BLD: 15 (ref 9–20)
C-REACTIVE PROTEIN: 41.1 MG/L (ref 0–5)
CALCIUM SERPL-MCNC: 9.7 MG/DL (ref 8.6–10.4)
CHLORIDE BLD-SCNC: 94 MMOL/L (ref 98–107)
CO2: 27 MMOL/L (ref 20–31)
CREAT SERPL-MCNC: 0.73 MG/DL (ref 0.5–0.9)
DIFFERENTIAL TYPE: ABNORMAL
EOSINOPHILS RELATIVE PERCENT: 1 % (ref 1–4)
GFR AFRICAN AMERICAN: >60 ML/MIN
GFR NON-AFRICAN AMERICAN: >60 ML/MIN
GFR SERPL CREATININE-BSD FRML MDRD: ABNORMAL ML/MIN/{1.73_M2}
GFR SERPL CREATININE-BSD FRML MDRD: ABNORMAL ML/MIN/{1.73_M2}
GLUCOSE BLD-MCNC: 157 MG/DL (ref 70–99)
HCT VFR BLD CALC: 32.7 % (ref 36–46)
HEMOGLOBIN: 10.9 G/DL (ref 12–16)
IMMATURE GRANULOCYTES: ABNORMAL %
LYMPHOCYTES # BLD: 17 % (ref 24–44)
MCH RBC QN AUTO: 30.7 PG (ref 26–34)
MCHC RBC AUTO-ENTMCNC: 33.3 G/DL (ref 31–37)
MCV RBC AUTO: 92.4 FL (ref 80–100)
MONOCYTES # BLD: 8 % (ref 1–7)
NRBC AUTOMATED: ABNORMAL PER 100 WBC
PDW BLD-RTO: 12.6 % (ref 11.5–14.5)
PLATELET # BLD: 332 K/UL (ref 130–400)
PLATELET ESTIMATE: ABNORMAL
PMV BLD AUTO: 7.5 FL (ref 6–12)
POTASSIUM SERPL-SCNC: 4.7 MMOL/L (ref 3.7–5.3)
RBC # BLD: 3.54 M/UL (ref 4–5.2)
RBC # BLD: ABNORMAL 10*6/UL
SEDIMENTATION RATE, ERYTHROCYTE: 114 MM (ref 0–20)
SEG NEUTROPHILS: 73 % (ref 36–66)
SEGMENTED NEUTROPHILS ABSOLUTE COUNT: 5.3 K/UL (ref 1.8–7.7)
SODIUM BLD-SCNC: 134 MMOL/L (ref 135–144)
WBC # BLD: 7.3 K/UL (ref 3.5–11)
WBC # BLD: ABNORMAL 10*3/UL

## 2018-09-24 PROCEDURE — 85651 RBC SED RATE NONAUTOMATED: CPT

## 2018-09-24 PROCEDURE — 6370000000 HC RX 637 (ALT 250 FOR IP): Performed by: NURSE PRACTITIONER

## 2018-09-24 PROCEDURE — 80048 BASIC METABOLIC PNL TOTAL CA: CPT

## 2018-09-24 PROCEDURE — 86140 C-REACTIVE PROTEIN: CPT

## 2018-09-24 PROCEDURE — 99283 EMERGENCY DEPT VISIT LOW MDM: CPT

## 2018-09-24 PROCEDURE — 85025 COMPLETE CBC W/AUTO DIFF WBC: CPT

## 2018-09-24 RX ORDER — ACETAMINOPHEN AND CODEINE PHOSPHATE 300; 30 MG/1; MG/1
1 TABLET ORAL ONCE
Status: COMPLETED | OUTPATIENT
Start: 2018-09-24 | End: 2018-09-24

## 2018-09-24 RX ORDER — DOXYCYCLINE HYCLATE 100 MG
100 TABLET ORAL 2 TIMES DAILY
Qty: 20 TABLET | Refills: 0 | Status: SHIPPED | OUTPATIENT
Start: 2018-09-24 | End: 2018-10-04

## 2018-09-24 RX ADMIN — ACETAMINOPHEN AND CODEINE PHOSPHATE 1 TABLET: 300; 30 TABLET ORAL at 17:33

## 2018-09-24 ASSESSMENT — ENCOUNTER SYMPTOMS: COLOR CHANGE: 1

## 2018-09-24 ASSESSMENT — PAIN SCALES - GENERAL: PAINLEVEL_OUTOF10: 5

## 2018-09-24 NOTE — ED PROVIDER NOTES
[address] [place] [address] ED  eMERGENCY dEPARTMENT eNCOUnter      Pt Name: Gil Lee  MRN: [id number]  Armsmissaelgfurt 2/12/1932  Date of evaluation: 9/24/2018  Provider: Lico Bernardo       Chief Complaint   Patient presents with    Wound Check     right leg         HISTORY OF PRESENT ILLNESS  (Location/Symptom, Timing/Onset, Context/Setting, Quality, Duration, Modifying Factors, Severity.)   Gil Lee is a 80 y.o. female who presents to the emergency department By private auto for evaluation of wound check to right leg. On September 4, 2018. Patient was getting something out of her freezer when a frozen pizza fell and struck her on the right shin causing a wound. Patient initially had a lot of bleeding. She is on liquids. She went to [facility] for evaluation and had the wounds cauterized in the ER. She was discharged home. Patient states she has developed increased redness around the wound over the past several weeks. She was started on Levaquin 4 days ago by her PCP for her wound and is scheduled to go to wound clinic next week. She denies fever or chills. She does have a history of diabetes. She states her leg hurts when she walks. Nursing Notes were reviewed.     PAST MEDICAL HISTORY     Past Medical History:   Diagnosis Date    Atrial fibrillation ([address]     CAD (coronary artery disease)     CHF (congestive heart failure) (HCC)     COPD (chronic obstructive pulmonary disease) ([address]     CVA (cerebral vascular accident) ([address]     Esophageal reflux     Other and unspecified hyperlipidemia     Other primary cardiomyopathies     Type II or unspecified type diabetes mellitus without mention of complication, not stated as uncontrolled     Unspecified asthma(493.90)     Unspecified essential hypertension          SURGICAL HISTORY       Past Surgical History:   Procedure Laterality Date    CORONARY ARTERY BYPASS GRAFT      PACEMAKER PLACEMENT           CURRENT Main Topics    Smoking status: Never Smoker    Smokeless tobacco: Never Used    Alcohol use No    Drug use: No    Sexual activity: Not Asked     Other Topics Concern    None     Social History Narrative    None         REVIEW OF SYSTEMS    (2-9 systems for level 4, 10 or more for level 5)     Review of Systems   Skin: Positive for color change and wound. All other systems reviewed and are negative. Except as noted above the remainder of the review of systems was reviewed and negative. PHYSICAL EXAM    (up to 7 for level 4, 8 or more for level 5)     ED Triage Vitals [09/24/18 1539]   BP Temp Temp Source Pulse Resp SpO2 Height Weight   (!) 140/93 97.7 °F (36.5 °C) Oral 87 16 98 % 5' 3\" (1.6 m) 156 lb (70.8 kg)       Physical Exam   Constitutional: She is oriented to person, place, and time. She appears well-developed and well-nourished. HENT:   Head: Normocephalic and atraumatic. Right Ear: External ear normal.   Left Ear: External ear normal.   Nose: Nose normal.   Mouth/Throat: Oropharynx is clear and moist.   Eyes: Pupils are equal, round, and reactive to light. Conjunctivae and EOM are normal.   Neck: Normal range of motion. Neck supple. Cardiovascular: Intact distal pulses and normal pulses. Pulses:       Dorsalis pedis pulses are 2+ on the right side. Posterior tibial pulses are 2+ on the right side. Pulmonary/Chest: Effort normal. No respiratory distress. Neurological: She is alert and oriented to person, place, and time. She has normal reflexes. No cranial nerve deficit. Coordination normal.   Skin: Skin is warm and dry. There is erythema. Psychiatric: She has a normal mood and affect.  Her behavior is normal. Judgment and thought content normal.         DIAGNOSTIC RESULTS     EKG: All EKG's are interpreted by the Emergency Department Physician who either signs or Co-signs this chart in the absence of a cardiologist.        RADIOLOGY:   Non-plain film images such as

## 2018-09-24 NOTE — ED NOTES
Sterile dressing applied to lower rt leg wound/ extra dressings provided     Dominique Valle, TRUPTI  22/71/32 2598

## 2019-01-08 ENCOUNTER — APPOINTMENT (OUTPATIENT)
Dept: GENERAL RADIOLOGY | Facility: CLINIC | Age: 84
DRG: 312 | End: 2019-01-08
Payer: MEDICARE

## 2019-01-08 ENCOUNTER — APPOINTMENT (OUTPATIENT)
Dept: CT IMAGING | Facility: CLINIC | Age: 84
DRG: 312 | End: 2019-01-08
Payer: MEDICARE

## 2019-01-08 ENCOUNTER — HOSPITAL ENCOUNTER (INPATIENT)
Age: 84
LOS: 1 days | Discharge: HOME | DRG: 312 | End: 2019-01-09
Attending: EMERGENCY MEDICINE | Admitting: INTERNAL MEDICINE
Payer: MEDICARE

## 2019-01-08 DIAGNOSIS — R55 SYNCOPE AND COLLAPSE: Primary | ICD-10-CM

## 2019-01-08 DIAGNOSIS — S62.102A CLOSED FRACTURE OF LEFT WRIST, INITIAL ENCOUNTER: ICD-10-CM

## 2019-01-08 LAB
-: NORMAL
ABSOLUTE EOS #: 0.1 K/UL (ref 0–0.4)
ABSOLUTE IMMATURE GRANULOCYTE: ABNORMAL K/UL (ref 0–0.3)
ABSOLUTE LYMPH #: 1.3 K/UL (ref 1–4.8)
ABSOLUTE MONO #: 0.6 K/UL (ref 0.1–1.2)
ANION GAP SERPL CALCULATED.3IONS-SCNC: 11 MMOL/L (ref 9–17)
BASOPHILS # BLD: 1 % (ref 0–2)
BASOPHILS ABSOLUTE: 0.1 K/UL (ref 0–0.2)
BUN BLDV-MCNC: 17 MG/DL (ref 8–23)
BUN/CREAT BLD: ABNORMAL (ref 9–20)
CALCIUM SERPL-MCNC: 9.1 MG/DL (ref 8.6–10.4)
CHLORIDE BLD-SCNC: 98 MMOL/L (ref 98–107)
CO2: 26 MMOL/L (ref 20–31)
CREAT SERPL-MCNC: 0.8 MG/DL (ref 0.5–0.9)
DIFFERENTIAL TYPE: ABNORMAL
EKG ATRIAL RATE: 394 BPM
EKG Q-T INTERVAL: 360 MS
EKG QRS DURATION: 76 MS
EKG QTC CALCULATION (BAZETT): 412 MS
EKG R AXIS: 11 DEGREES
EKG T AXIS: -112 DEGREES
EKG VENTRICULAR RATE: 79 BPM
EOSINOPHILS RELATIVE PERCENT: 1 % (ref 1–4)
GFR AFRICAN AMERICAN: >60 ML/MIN
GFR NON-AFRICAN AMERICAN: >60 ML/MIN
GFR SERPL CREATININE-BSD FRML MDRD: ABNORMAL ML/MIN/{1.73_M2}
GFR SERPL CREATININE-BSD FRML MDRD: ABNORMAL ML/MIN/{1.73_M2}
GLUCOSE BLD-MCNC: 206 MG/DL (ref 70–99)
GLUCOSE BLD-MCNC: 213 MG/DL (ref 65–105)
HCT VFR BLD CALC: 40.6 % (ref 36–46)
HEMOGLOBIN: 12.9 G/DL (ref 12–16)
IMMATURE GRANULOCYTES: ABNORMAL %
INR BLD: 1.1
LYMPHOCYTES # BLD: 14 % (ref 24–44)
MCH RBC QN AUTO: 30.8 PG (ref 26–34)
MCHC RBC AUTO-ENTMCNC: 31.8 G/DL (ref 31–37)
MCV RBC AUTO: 96.9 FL (ref 80–100)
MONOCYTES # BLD: 6 % (ref 2–11)
NRBC AUTOMATED: ABNORMAL PER 100 WBC
PARTIAL THROMBOPLASTIN TIME: 23.7 SEC (ref 21.3–31.3)
PDW BLD-RTO: 13.8 % (ref 12.5–15.4)
PLATELET # BLD: 192 K/UL (ref 140–450)
PLATELET ESTIMATE: ABNORMAL
PMV BLD AUTO: 9.2 FL (ref 6–12)
POTASSIUM SERPL-SCNC: 4.8 MMOL/L (ref 3.7–5.3)
PROTHROMBIN TIME: 11.6 SEC (ref 9.4–12.6)
RBC # BLD: 4.19 M/UL (ref 4–5.2)
RBC # BLD: ABNORMAL 10*6/UL
REASON FOR REJECTION: NORMAL
SEG NEUTROPHILS: 78 % (ref 36–66)
SEGMENTED NEUTROPHILS ABSOLUTE COUNT: 7.2 K/UL (ref 1.8–7.7)
SODIUM BLD-SCNC: 135 MMOL/L (ref 135–144)
TROPONIN INTERP: NORMAL
TROPONIN T: NORMAL NG/ML
TROPONIN, HIGH SENSITIVITY: 6 NG/L (ref 0–14)
WBC # BLD: 9.2 K/UL (ref 3.5–11)
WBC # BLD: ABNORMAL 10*3/UL
ZZ NTE CLEAN UP: ORDERED TEST: NORMAL
ZZ NTE WITH NAME CLEAN UP: SPECIMEN SOURCE: NORMAL

## 2019-01-08 PROCEDURE — 1200000000 HC SEMI PRIVATE

## 2019-01-08 PROCEDURE — 84484 ASSAY OF TROPONIN QUANT: CPT

## 2019-01-08 PROCEDURE — 99285 EMERGENCY DEPT VISIT HI MDM: CPT

## 2019-01-08 PROCEDURE — 80048 BASIC METABOLIC PNL TOTAL CA: CPT

## 2019-01-08 PROCEDURE — 6370000000 HC RX 637 (ALT 250 FOR IP): Performed by: EMERGENCY MEDICINE

## 2019-01-08 PROCEDURE — 85025 COMPLETE CBC W/AUTO DIFF WBC: CPT

## 2019-01-08 PROCEDURE — 2580000003 HC RX 258: Performed by: EMERGENCY MEDICINE

## 2019-01-08 PROCEDURE — 85730 THROMBOPLASTIN TIME PARTIAL: CPT

## 2019-01-08 PROCEDURE — 85610 PROTHROMBIN TIME: CPT

## 2019-01-08 PROCEDURE — 36415 COLL VENOUS BLD VENIPUNCTURE: CPT

## 2019-01-08 PROCEDURE — G0378 HOSPITAL OBSERVATION PER HR: HCPCS

## 2019-01-08 PROCEDURE — 96360 HYDRATION IV INFUSION INIT: CPT

## 2019-01-08 PROCEDURE — 71045 X-RAY EXAM CHEST 1 VIEW: CPT

## 2019-01-08 PROCEDURE — 72125 CT NECK SPINE W/O DYE: CPT

## 2019-01-08 PROCEDURE — 82947 ASSAY GLUCOSE BLOOD QUANT: CPT

## 2019-01-08 PROCEDURE — 70450 CT HEAD/BRAIN W/O DYE: CPT

## 2019-01-08 PROCEDURE — 73110 X-RAY EXAM OF WRIST: CPT

## 2019-01-08 RX ORDER — ACETAMINOPHEN 325 MG/1
650 TABLET ORAL ONCE
Status: COMPLETED | OUTPATIENT
Start: 2019-01-09 | End: 2019-01-08

## 2019-01-08 RX ORDER — SODIUM CHLORIDE 9 MG/ML
INJECTION, SOLUTION INTRAVENOUS CONTINUOUS
Status: DISCONTINUED | OUTPATIENT
Start: 2019-01-08 | End: 2019-01-09

## 2019-01-08 RX ADMIN — SODIUM CHLORIDE: 9 INJECTION, SOLUTION INTRAVENOUS at 19:37

## 2019-01-08 RX ADMIN — ACETAMINOPHEN 650 MG: 325 TABLET ORAL at 23:42

## 2019-01-08 ASSESSMENT — PAIN SCALES - GENERAL
PAINLEVEL_OUTOF10: 0
PAINLEVEL_OUTOF10: 8

## 2019-01-08 ASSESSMENT — PAIN DESCRIPTION - PAIN TYPE: TYPE: ACUTE PAIN

## 2019-01-08 ASSESSMENT — ENCOUNTER SYMPTOMS: ROS SKIN COMMENTS: ABRASION TO LEFT ELBOW

## 2019-01-08 ASSESSMENT — PAIN DESCRIPTION - ORIENTATION: ORIENTATION: LEFT

## 2019-01-08 ASSESSMENT — PAIN DESCRIPTION - FREQUENCY: FREQUENCY: CONTINUOUS

## 2019-01-08 ASSESSMENT — PAIN DESCRIPTION - DESCRIPTORS: DESCRIPTORS: ACHING

## 2019-01-08 ASSESSMENT — PAIN DESCRIPTION - LOCATION: LOCATION: WRIST

## 2019-01-09 ENCOUNTER — APPOINTMENT (OUTPATIENT)
Dept: GENERAL RADIOLOGY | Age: 84
DRG: 312 | End: 2019-01-09
Payer: MEDICARE

## 2019-01-09 VITALS
HEIGHT: 63 IN | BODY MASS INDEX: 28.83 KG/M2 | HEART RATE: 66 BPM | TEMPERATURE: 97.3 F | SYSTOLIC BLOOD PRESSURE: 128 MMHG | OXYGEN SATURATION: 97 % | WEIGHT: 162.7 LBS | RESPIRATION RATE: 18 BRPM | DIASTOLIC BLOOD PRESSURE: 55 MMHG

## 2019-01-09 PROBLEM — R33.8 ACUTE URINARY RETENTION: Status: RESOLVED | Noted: 2017-10-02 | Resolved: 2019-01-09

## 2019-01-09 PROBLEM — T81.9XXA POSTOPERATIVE OR SURGICAL COMPLICATION, INITIAL ENCOUNTER: Status: RESOLVED | Noted: 2018-08-18 | Resolved: 2019-01-09

## 2019-01-09 PROBLEM — N30.00 ACUTE CYSTITIS WITHOUT HEMATURIA: Status: RESOLVED | Noted: 2017-10-02 | Resolved: 2019-01-09

## 2019-01-09 PROBLEM — S62.102A CLOSED FRACTURE OF LEFT WRIST: Status: ACTIVE | Noted: 2019-01-09

## 2019-01-09 LAB
ANION GAP SERPL CALCULATED.3IONS-SCNC: 12 MMOL/L (ref 9–17)
BNP INTERPRETATION: ABNORMAL
BUN BLDV-MCNC: 14 MG/DL (ref 8–23)
BUN/CREAT BLD: 18 (ref 9–20)
CALCIUM SERPL-MCNC: 8.8 MG/DL (ref 8.6–10.4)
CHLORIDE BLD-SCNC: 100 MMOL/L (ref 98–107)
CO2: 24 MMOL/L (ref 20–31)
CREAT SERPL-MCNC: 0.8 MG/DL (ref 0.5–0.9)
ESTIMATED AVERAGE GLUCOSE: 177 MG/DL
GFR AFRICAN AMERICAN: >60 ML/MIN
GFR NON-AFRICAN AMERICAN: >60 ML/MIN
GFR SERPL CREATININE-BSD FRML MDRD: ABNORMAL ML/MIN/{1.73_M2}
GFR SERPL CREATININE-BSD FRML MDRD: ABNORMAL ML/MIN/{1.73_M2}
GLUCOSE BLD-MCNC: 135 MG/DL (ref 65–105)
GLUCOSE BLD-MCNC: 190 MG/DL (ref 65–105)
GLUCOSE BLD-MCNC: 195 MG/DL (ref 70–99)
GLUCOSE BLD-MCNC: 222 MG/DL (ref 65–105)
HBA1C MFR BLD: 7.8 % (ref 4–6)
HCT VFR BLD CALC: 36.3 % (ref 36–46)
HEMOGLOBIN: 12.1 G/DL (ref 12–16)
LV EF: 60 %
LVEF MODALITY: NORMAL
MAGNESIUM: 1.8 MG/DL (ref 1.6–2.6)
MCH RBC QN AUTO: 30.6 PG (ref 26–34)
MCHC RBC AUTO-ENTMCNC: 33.2 G/DL (ref 31–37)
MCV RBC AUTO: 92.2 FL (ref 80–100)
NRBC AUTOMATED: ABNORMAL PER 100 WBC
PDW BLD-RTO: 13.3 % (ref 11.5–14.5)
PLATELET # BLD: 193 K/UL (ref 130–400)
PMV BLD AUTO: 8.5 FL (ref 6–12)
POTASSIUM SERPL-SCNC: 4.3 MMOL/L (ref 3.7–5.3)
PRO-BNP: 856 PG/ML
RBC # BLD: 3.94 M/UL (ref 4–5.2)
SODIUM BLD-SCNC: 136 MMOL/L (ref 135–144)
TROPONIN INTERP: NORMAL
TROPONIN INTERP: NORMAL
TROPONIN T: NORMAL NG/ML
TROPONIN T: NORMAL NG/ML
TROPONIN, HIGH SENSITIVITY: 7 NG/L (ref 0–14)
TROPONIN, HIGH SENSITIVITY: 9 NG/L (ref 0–14)
WBC # BLD: 8.7 K/UL (ref 3.5–11)

## 2019-01-09 PROCEDURE — 94664 DEMO&/EVAL PT USE INHALER: CPT

## 2019-01-09 PROCEDURE — 6370000000 HC RX 637 (ALT 250 FOR IP): Performed by: NURSE PRACTITIONER

## 2019-01-09 PROCEDURE — 84484 ASSAY OF TROPONIN QUANT: CPT

## 2019-01-09 PROCEDURE — 99222 1ST HOSP IP/OBS MODERATE 55: CPT | Performed by: INTERNAL MEDICINE

## 2019-01-09 PROCEDURE — 83036 HEMOGLOBIN GLYCOSYLATED A1C: CPT

## 2019-01-09 PROCEDURE — 97162 PT EVAL MOD COMPLEX 30 MIN: CPT

## 2019-01-09 PROCEDURE — 83880 ASSAY OF NATRIURETIC PEPTIDE: CPT

## 2019-01-09 PROCEDURE — 96372 THER/PROPH/DIAG INJ SC/IM: CPT

## 2019-01-09 PROCEDURE — 93005 ELECTROCARDIOGRAM TRACING: CPT

## 2019-01-09 PROCEDURE — 80048 BASIC METABOLIC PNL TOTAL CA: CPT

## 2019-01-09 PROCEDURE — 73110 X-RAY EXAM OF WRIST: CPT

## 2019-01-09 PROCEDURE — G0378 HOSPITAL OBSERVATION PER HR: HCPCS

## 2019-01-09 PROCEDURE — 83735 ASSAY OF MAGNESIUM: CPT

## 2019-01-09 PROCEDURE — 93306 TTE W/DOPPLER COMPLETE: CPT

## 2019-01-09 PROCEDURE — 0PSJXZZ REPOSITION LEFT RADIUS, EXTERNAL APPROACH: ICD-10-PCS | Performed by: ORTHOPAEDIC SURGERY

## 2019-01-09 PROCEDURE — 96361 HYDRATE IV INFUSION ADD-ON: CPT

## 2019-01-09 PROCEDURE — 82947 ASSAY GLUCOSE BLOOD QUANT: CPT

## 2019-01-09 PROCEDURE — 97530 THERAPEUTIC ACTIVITIES: CPT

## 2019-01-09 PROCEDURE — 97116 GAIT TRAINING THERAPY: CPT

## 2019-01-09 PROCEDURE — 2500000003 HC RX 250 WO HCPCS: Performed by: ORTHOPAEDIC SURGERY

## 2019-01-09 PROCEDURE — 6360000002 HC RX W HCPCS: Performed by: NURSE PRACTITIONER

## 2019-01-09 PROCEDURE — 85027 COMPLETE CBC AUTOMATED: CPT

## 2019-01-09 PROCEDURE — 2580000003 HC RX 258: Performed by: NURSE PRACTITIONER

## 2019-01-09 PROCEDURE — 94640 AIRWAY INHALATION TREATMENT: CPT

## 2019-01-09 PROCEDURE — 36415 COLL VENOUS BLD VENIPUNCTURE: CPT

## 2019-01-09 RX ORDER — ACETAMINOPHEN 325 MG/1
650 TABLET ORAL EVERY 4 HOURS PRN
Status: DISCONTINUED | OUTPATIENT
Start: 2019-01-09 | End: 2019-01-09 | Stop reason: HOSPADM

## 2019-01-09 RX ORDER — ALBUTEROL SULFATE 2.5 MG/3ML
2.5 SOLUTION RESPIRATORY (INHALATION) 2 TIMES DAILY
Status: DISCONTINUED | OUTPATIENT
Start: 2019-01-09 | End: 2019-01-09 | Stop reason: HOSPADM

## 2019-01-09 RX ORDER — FUROSEMIDE 40 MG/1
40 TABLET ORAL DAILY
Status: DISCONTINUED | OUTPATIENT
Start: 2019-01-09 | End: 2019-01-09 | Stop reason: HOSPADM

## 2019-01-09 RX ORDER — MAGNESIUM SULFATE 1 G/100ML
1 INJECTION INTRAVENOUS PRN
Status: DISCONTINUED | OUTPATIENT
Start: 2019-01-09 | End: 2019-01-09 | Stop reason: HOSPADM

## 2019-01-09 RX ORDER — PANTOPRAZOLE SODIUM 40 MG/1
40 TABLET, DELAYED RELEASE ORAL
Status: DISCONTINUED | OUTPATIENT
Start: 2019-01-09 | End: 2019-01-09 | Stop reason: HOSPADM

## 2019-01-09 RX ORDER — LIDOCAINE HYDROCHLORIDE 10 MG/ML
10 INJECTION, SOLUTION EPIDURAL; INFILTRATION; INTRACAUDAL; PERINEURAL ONCE
Status: COMPLETED | OUTPATIENT
Start: 2019-01-09 | End: 2019-01-09

## 2019-01-09 RX ORDER — POTASSIUM CHLORIDE 20MEQ/15ML
40 LIQUID (ML) ORAL PRN
Status: DISCONTINUED | OUTPATIENT
Start: 2019-01-09 | End: 2019-01-09 | Stop reason: HOSPADM

## 2019-01-09 RX ORDER — SODIUM CHLORIDE 0.9 % (FLUSH) 0.9 %
10 SYRINGE (ML) INJECTION EVERY 12 HOURS SCHEDULED
Status: DISCONTINUED | OUTPATIENT
Start: 2019-01-09 | End: 2019-01-09 | Stop reason: HOSPADM

## 2019-01-09 RX ORDER — HYDROCODONE BITARTRATE AND ACETAMINOPHEN 5; 325 MG/1; MG/1
2 TABLET ORAL EVERY 4 HOURS PRN
Status: DISCONTINUED | OUTPATIENT
Start: 2019-01-09 | End: 2019-01-09 | Stop reason: HOSPADM

## 2019-01-09 RX ORDER — ONDANSETRON 2 MG/ML
4 INJECTION INTRAMUSCULAR; INTRAVENOUS EVERY 6 HOURS PRN
Status: DISCONTINUED | OUTPATIENT
Start: 2019-01-09 | End: 2019-01-09 | Stop reason: HOSPADM

## 2019-01-09 RX ORDER — ONDANSETRON 4 MG/1
4 TABLET, ORALLY DISINTEGRATING ORAL EVERY 6 HOURS PRN
Status: DISCONTINUED | OUTPATIENT
Start: 2019-01-09 | End: 2019-01-09 | Stop reason: HOSPADM

## 2019-01-09 RX ORDER — SODIUM CHLORIDE 9 MG/ML
INJECTION, SOLUTION INTRAVENOUS CONTINUOUS
Status: DISCONTINUED | OUTPATIENT
Start: 2019-01-09 | End: 2019-01-09 | Stop reason: HOSPADM

## 2019-01-09 RX ORDER — SPIRONOLACTONE 25 MG/1
25 TABLET ORAL DAILY
Status: DISCONTINUED | OUTPATIENT
Start: 2019-01-09 | End: 2019-01-09 | Stop reason: HOSPADM

## 2019-01-09 RX ORDER — BISACODYL 10 MG
10 SUPPOSITORY, RECTAL RECTAL DAILY PRN
Status: DISCONTINUED | OUTPATIENT
Start: 2019-01-09 | End: 2019-01-09 | Stop reason: HOSPADM

## 2019-01-09 RX ORDER — PRAVASTATIN SODIUM 40 MG
80 TABLET ORAL NIGHTLY
Status: DISCONTINUED | OUTPATIENT
Start: 2019-01-09 | End: 2019-01-09 | Stop reason: HOSPADM

## 2019-01-09 RX ORDER — ALBUTEROL SULFATE 2.5 MG/3ML
2.5 SOLUTION RESPIRATORY (INHALATION)
Status: DISCONTINUED | OUTPATIENT
Start: 2019-01-09 | End: 2019-01-09 | Stop reason: HOSPADM

## 2019-01-09 RX ORDER — DEXTROSE MONOHYDRATE 25 G/50ML
12.5 INJECTION, SOLUTION INTRAVENOUS PRN
Status: DISCONTINUED | OUTPATIENT
Start: 2019-01-09 | End: 2019-01-09 | Stop reason: HOSPADM

## 2019-01-09 RX ORDER — ONDANSETRON 2 MG/ML
4 INJECTION INTRAMUSCULAR; INTRAVENOUS EVERY 6 HOURS PRN
Status: DISCONTINUED | OUTPATIENT
Start: 2019-01-09 | End: 2019-01-09 | Stop reason: SDUPTHER

## 2019-01-09 RX ORDER — POTASSIUM CHLORIDE 7.45 MG/ML
10 INJECTION INTRAVENOUS PRN
Status: DISCONTINUED | OUTPATIENT
Start: 2019-01-09 | End: 2019-01-09 | Stop reason: HOSPADM

## 2019-01-09 RX ORDER — LIDOCAINE HYDROCHLORIDE 10 MG/ML
5 INJECTION, SOLUTION EPIDURAL; INFILTRATION; INTRACAUDAL; PERINEURAL ONCE
Status: DISCONTINUED | OUTPATIENT
Start: 2019-01-09 | End: 2019-01-09

## 2019-01-09 RX ORDER — HYDROCODONE BITARTRATE AND ACETAMINOPHEN 5; 325 MG/1; MG/1
1 TABLET ORAL EVERY 4 HOURS PRN
Status: DISCONTINUED | OUTPATIENT
Start: 2019-01-09 | End: 2019-01-09 | Stop reason: HOSPADM

## 2019-01-09 RX ORDER — DEXTROSE MONOHYDRATE 50 MG/ML
100 INJECTION, SOLUTION INTRAVENOUS PRN
Status: DISCONTINUED | OUTPATIENT
Start: 2019-01-09 | End: 2019-01-09 | Stop reason: HOSPADM

## 2019-01-09 RX ORDER — DOCUSATE SODIUM 100 MG/1
100 CAPSULE, LIQUID FILLED ORAL PRN
Status: DISCONTINUED | OUTPATIENT
Start: 2019-01-09 | End: 2019-01-09 | Stop reason: HOSPADM

## 2019-01-09 RX ORDER — CARVEDILOL 6.25 MG/1
6.25 TABLET ORAL 2 TIMES DAILY
Status: DISCONTINUED | OUTPATIENT
Start: 2019-01-09 | End: 2019-01-09 | Stop reason: HOSPADM

## 2019-01-09 RX ORDER — NICOTINE POLACRILEX 4 MG
15 LOZENGE BUCCAL PRN
Status: DISCONTINUED | OUTPATIENT
Start: 2019-01-09 | End: 2019-01-09 | Stop reason: HOSPADM

## 2019-01-09 RX ORDER — NICOTINE 21 MG/24HR
1 PATCH, TRANSDERMAL 24 HOURS TRANSDERMAL DAILY PRN
Status: DISCONTINUED | OUTPATIENT
Start: 2019-01-09 | End: 2019-01-09 | Stop reason: HOSPADM

## 2019-01-09 RX ORDER — SODIUM CHLORIDE 0.9 % (FLUSH) 0.9 %
10 SYRINGE (ML) INJECTION PRN
Status: DISCONTINUED | OUTPATIENT
Start: 2019-01-09 | End: 2019-01-09 | Stop reason: HOSPADM

## 2019-01-09 RX ORDER — POTASSIUM CHLORIDE 20 MEQ/1
40 TABLET, EXTENDED RELEASE ORAL PRN
Status: DISCONTINUED | OUTPATIENT
Start: 2019-01-09 | End: 2019-01-09 | Stop reason: HOSPADM

## 2019-01-09 RX ADMIN — CARVEDILOL 6.25 MG: 6.25 TABLET, FILM COATED ORAL at 09:00

## 2019-01-09 RX ADMIN — HYDROCODONE BITARTRATE AND ACETAMINOPHEN 2 TABLET: 5; 325 TABLET ORAL at 09:00

## 2019-01-09 RX ADMIN — PANTOPRAZOLE SODIUM 40 MG: 40 TABLET, DELAYED RELEASE ORAL at 06:23

## 2019-01-09 RX ADMIN — LIDOCAINE HYDROCHLORIDE 10 ML: 10 INJECTION, SOLUTION EPIDURAL; INFILTRATION; INTRACAUDAL; PERINEURAL at 12:43

## 2019-01-09 RX ADMIN — SODIUM CHLORIDE: 9 INJECTION, SOLUTION INTRAVENOUS at 01:00

## 2019-01-09 RX ADMIN — FUROSEMIDE 40 MG: 40 TABLET ORAL at 09:00

## 2019-01-09 RX ADMIN — MAGNESIUM OXIDE TAB 400 MG (241.3 MG ELEMENTAL MG) 400 MG: 400 (241.3 MG) TAB at 09:00

## 2019-01-09 RX ADMIN — INSULIN LISPRO 4 UNITS: 100 INJECTION, SOLUTION INTRAVENOUS; SUBCUTANEOUS at 11:34

## 2019-01-09 RX ADMIN — HYDROCODONE BITARTRATE AND ACETAMINOPHEN 2 TABLET: 5; 325 TABLET ORAL at 06:23

## 2019-01-09 RX ADMIN — SPIRONOLACTONE 25 MG: 25 TABLET ORAL at 09:00

## 2019-01-09 RX ADMIN — ENOXAPARIN SODIUM 40 MG: 40 INJECTION SUBCUTANEOUS at 09:00

## 2019-01-09 RX ADMIN — HYDROCODONE BITARTRATE AND ACETAMINOPHEN 1 TABLET: 5; 325 TABLET ORAL at 02:05

## 2019-01-09 ASSESSMENT — PAIN DESCRIPTION - PAIN TYPE
TYPE: ACUTE PAIN

## 2019-01-09 ASSESSMENT — PAIN DESCRIPTION - FREQUENCY
FREQUENCY: CONTINUOUS

## 2019-01-09 ASSESSMENT — PAIN DESCRIPTION - DESCRIPTORS
DESCRIPTORS: ACHING

## 2019-01-09 ASSESSMENT — PAIN DESCRIPTION - PROGRESSION
CLINICAL_PROGRESSION: NOT CHANGED
CLINICAL_PROGRESSION: GRADUALLY IMPROVING
CLINICAL_PROGRESSION: NOT CHANGED
CLINICAL_PROGRESSION: GRADUALLY WORSENING
CLINICAL_PROGRESSION: NOT CHANGED

## 2019-01-09 ASSESSMENT — PAIN DESCRIPTION - LOCATION
LOCATION: WRIST

## 2019-01-09 ASSESSMENT — PAIN DESCRIPTION - ORIENTATION
ORIENTATION: LEFT

## 2019-01-09 ASSESSMENT — PAIN SCALES - GENERAL
PAINLEVEL_OUTOF10: 4
PAINLEVEL_OUTOF10: 6
PAINLEVEL_OUTOF10: 7
PAINLEVEL_OUTOF10: 6
PAINLEVEL_OUTOF10: 2
PAINLEVEL_OUTOF10: 6
PAINLEVEL_OUTOF10: 6
PAINLEVEL_OUTOF10: 5
PAINLEVEL_OUTOF10: 6
PAINLEVEL_OUTOF10: 6
PAINLEVEL_OUTOF10: 2
PAINLEVEL_OUTOF10: 6
PAINLEVEL_OUTOF10: 3
PAINLEVEL_OUTOF10: 4
PAINLEVEL_OUTOF10: 6

## 2019-01-09 ASSESSMENT — PAIN DESCRIPTION - ONSET
ONSET: ON-GOING

## 2019-01-09 ASSESSMENT — PAIN DESCRIPTION - DIRECTION
RADIATING_TOWARDS: FOREARM

## 2019-01-09 ASSESSMENT — PAIN - FUNCTIONAL ASSESSMENT: PAIN_FUNCTIONAL_ASSESSMENT: PREVENTS OR INTERFERES SOME ACTIVE ACTIVITIES AND ADLS

## 2019-01-17 ENCOUNTER — HOSPITAL ENCOUNTER (OUTPATIENT)
Dept: GENERAL RADIOLOGY | Age: 84
Discharge: HOME | End: 2019-01-19
Payer: MEDICARE

## 2019-01-17 ENCOUNTER — OFFICE VISIT (OUTPATIENT)
Dept: ORTHOPEDIC SURGERY | Age: 84
End: 2019-01-17

## 2019-01-17 ENCOUNTER — HOSPITAL ENCOUNTER (OUTPATIENT)
Age: 84
Discharge: HOME | End: 2019-01-19
Payer: MEDICARE

## 2019-01-17 VITALS — HEIGHT: 63 IN | BODY MASS INDEX: 28.83 KG/M2 | WEIGHT: 162.7 LBS

## 2019-01-17 DIAGNOSIS — S52.532A CLOSED COLLES' FRACTURE OF LEFT RADIUS, INITIAL ENCOUNTER: ICD-10-CM

## 2019-01-17 DIAGNOSIS — S52.532A CLOSED COLLES' FRACTURE OF LEFT RADIUS, INITIAL ENCOUNTER: Primary | ICD-10-CM

## 2019-01-17 PROCEDURE — 99024 POSTOP FOLLOW-UP VISIT: CPT | Performed by: ORTHOPAEDIC SURGERY

## 2019-01-17 PROCEDURE — 73110 X-RAY EXAM OF WRIST: CPT

## 2019-01-17 RX ORDER — ACETAMINOPHEN AND CODEINE PHOSPHATE 300; 30 MG/1; MG/1
1-2 TABLET ORAL EVERY 4 HOURS PRN
COMMUNITY
Start: 2019-01-13

## 2019-01-21 ENCOUNTER — TELEPHONE (OUTPATIENT)
Dept: ORTHOPEDIC SURGERY | Age: 84
End: 2019-01-21

## 2019-01-22 ENCOUNTER — TELEPHONE (OUTPATIENT)
Dept: ORTHOPEDIC SURGERY | Age: 84
End: 2019-01-22

## 2019-01-22 DIAGNOSIS — S52.532A CLOSED COLLES' FRACTURE OF LEFT RADIUS, INITIAL ENCOUNTER: Primary | ICD-10-CM

## 2019-01-24 ENCOUNTER — HOSPITAL ENCOUNTER (OUTPATIENT)
Age: 84
Discharge: HOME | End: 2019-01-26
Payer: MEDICARE

## 2019-01-24 ENCOUNTER — HOSPITAL ENCOUNTER (OUTPATIENT)
Dept: GENERAL RADIOLOGY | Age: 84
Discharge: HOME | End: 2019-01-26
Payer: MEDICARE

## 2019-01-24 DIAGNOSIS — S52.532A CLOSED COLLES' FRACTURE OF LEFT RADIUS, INITIAL ENCOUNTER: ICD-10-CM

## 2019-01-24 PROCEDURE — 73110 X-RAY EXAM OF WRIST: CPT

## 2019-01-25 ENCOUNTER — HOSPITAL ENCOUNTER (OUTPATIENT)
Age: 84
Setting detail: OUTPATIENT SURGERY
Discharge: HOME | End: 2019-01-25
Attending: ORTHOPAEDIC SURGERY | Admitting: ORTHOPAEDIC SURGERY
Payer: MEDICARE

## 2019-01-25 ENCOUNTER — ANESTHESIA EVENT (OUTPATIENT)
Dept: OPERATING ROOM | Age: 84
End: 2019-01-25
Payer: MEDICARE

## 2019-01-25 ENCOUNTER — APPOINTMENT (OUTPATIENT)
Dept: GENERAL RADIOLOGY | Age: 84
End: 2019-01-25
Attending: ORTHOPAEDIC SURGERY
Payer: MEDICARE

## 2019-01-25 ENCOUNTER — ANESTHESIA (OUTPATIENT)
Dept: OPERATING ROOM | Age: 84
End: 2019-01-25
Payer: MEDICARE

## 2019-01-25 VITALS
DIASTOLIC BLOOD PRESSURE: 88 MMHG | OXYGEN SATURATION: 98 % | HEART RATE: 68 BPM | BODY MASS INDEX: 28.7 KG/M2 | RESPIRATION RATE: 18 BRPM | SYSTOLIC BLOOD PRESSURE: 133 MMHG | HEIGHT: 63 IN | TEMPERATURE: 97.9 F | WEIGHT: 162 LBS

## 2019-01-25 VITALS — SYSTOLIC BLOOD PRESSURE: 179 MMHG | OXYGEN SATURATION: 100 % | DIASTOLIC BLOOD PRESSURE: 103 MMHG | TEMPERATURE: 96.9 F

## 2019-01-25 DIAGNOSIS — S62.102A CLOSED FRACTURE OF LEFT WRIST, INITIAL ENCOUNTER: Primary | ICD-10-CM

## 2019-01-25 LAB
GFR NON-AFRICAN AMERICAN: >60 ML/MIN
GFR SERPL CREATININE-BSD FRML MDRD: >60 ML/MIN
GFR SERPL CREATININE-BSD FRML MDRD: NORMAL ML/MIN/{1.73_M2}
GLUCOSE BLD-MCNC: 147 MG/DL (ref 74–100)
GLUCOSE BLD-MCNC: 151 MG/DL (ref 65–105)
POC CREATININE: 0.89 MG/DL (ref 0.51–1.19)
POC POTASSIUM: 4.1 MMOL/L (ref 3.5–4.5)

## 2019-01-25 PROCEDURE — 2500000003 HC RX 250 WO HCPCS: Performed by: STUDENT IN AN ORGANIZED HEALTH CARE EDUCATION/TRAINING PROGRAM

## 2019-01-25 PROCEDURE — 3600000014 HC SURGERY LEVEL 4 ADDTL 15MIN: Performed by: ORTHOPAEDIC SURGERY

## 2019-01-25 PROCEDURE — 7100000011 HC PHASE II RECOVERY - ADDTL 15 MIN: Performed by: ORTHOPAEDIC SURGERY

## 2019-01-25 PROCEDURE — 73100 X-RAY EXAM OF WRIST: CPT

## 2019-01-25 PROCEDURE — 6360000002 HC RX W HCPCS: Performed by: NURSE ANESTHETIST, CERTIFIED REGISTERED

## 2019-01-25 PROCEDURE — 2580000003 HC RX 258: Performed by: ANESTHESIOLOGY

## 2019-01-25 PROCEDURE — 3700000001 HC ADD 15 MINUTES (ANESTHESIA): Performed by: ORTHOPAEDIC SURGERY

## 2019-01-25 PROCEDURE — 82947 ASSAY GLUCOSE BLOOD QUANT: CPT

## 2019-01-25 PROCEDURE — 84132 ASSAY OF SERUM POTASSIUM: CPT

## 2019-01-25 PROCEDURE — 73110 X-RAY EXAM OF WRIST: CPT

## 2019-01-25 PROCEDURE — 3600000004 HC SURGERY LEVEL 4 BASE: Performed by: ORTHOPAEDIC SURGERY

## 2019-01-25 PROCEDURE — 7100000010 HC PHASE II RECOVERY - FIRST 15 MIN: Performed by: ORTHOPAEDIC SURGERY

## 2019-01-25 PROCEDURE — 6370000000 HC RX 637 (ALT 250 FOR IP): Performed by: ANESTHESIOLOGY

## 2019-01-25 PROCEDURE — 82565 ASSAY OF CREATININE: CPT

## 2019-01-25 PROCEDURE — 7100000001 HC PACU RECOVERY - ADDTL 15 MIN: Performed by: ORTHOPAEDIC SURGERY

## 2019-01-25 PROCEDURE — 3700000000 HC ANESTHESIA ATTENDED CARE: Performed by: ORTHOPAEDIC SURGERY

## 2019-01-25 PROCEDURE — C1713 ANCHOR/SCREW BN/BN,TIS/BN: HCPCS | Performed by: ORTHOPAEDIC SURGERY

## 2019-01-25 PROCEDURE — 2500000003 HC RX 250 WO HCPCS: Performed by: NURSE ANESTHETIST, CERTIFIED REGISTERED

## 2019-01-25 PROCEDURE — 2709999900 HC NON-CHARGEABLE SUPPLY: Performed by: ORTHOPAEDIC SURGERY

## 2019-01-25 PROCEDURE — 2580000003 HC RX 258: Performed by: ORTHOPAEDIC SURGERY

## 2019-01-25 PROCEDURE — 6360000002 HC RX W HCPCS: Performed by: ANESTHESIOLOGY

## 2019-01-25 PROCEDURE — 7100000000 HC PACU RECOVERY - FIRST 15 MIN: Performed by: ORTHOPAEDIC SURGERY

## 2019-01-25 DEVICE — IMPLANTABLE DEVICE
Type: IMPLANTABLE DEVICE | Site: ARM | Status: FUNCTIONAL
Brand: MICROAIRE®

## 2019-01-25 RX ORDER — MAGNESIUM HYDROXIDE 1200 MG/15ML
LIQUID ORAL CONTINUOUS PRN
Status: DISCONTINUED | OUTPATIENT
Start: 2019-01-25 | End: 2019-01-25 | Stop reason: HOSPADM

## 2019-01-25 RX ORDER — FENTANYL CITRATE 50 UG/ML
25 INJECTION, SOLUTION INTRAMUSCULAR; INTRAVENOUS EVERY 5 MIN PRN
Status: DISCONTINUED | OUTPATIENT
Start: 2019-01-25 | End: 2019-01-25 | Stop reason: HOSPADM

## 2019-01-25 RX ORDER — FENTANYL CITRATE 50 UG/ML
INJECTION, SOLUTION INTRAMUSCULAR; INTRAVENOUS PRN
Status: DISCONTINUED | OUTPATIENT
Start: 2019-01-25 | End: 2019-01-25 | Stop reason: SDUPTHER

## 2019-01-25 RX ORDER — PROPOFOL 10 MG/ML
INJECTION, EMULSION INTRAVENOUS PRN
Status: DISCONTINUED | OUTPATIENT
Start: 2019-01-25 | End: 2019-01-25 | Stop reason: SDUPTHER

## 2019-01-25 RX ORDER — HYDROCODONE BITARTRATE AND ACETAMINOPHEN 5; 325 MG/1; MG/1
1 TABLET ORAL ONCE
Status: COMPLETED | OUTPATIENT
Start: 2019-01-25 | End: 2019-01-25

## 2019-01-25 RX ORDER — CLINDAMYCIN PHOSPHATE 900 MG/50ML
900 INJECTION INTRAVENOUS
Status: COMPLETED | OUTPATIENT
Start: 2019-01-25 | End: 2019-01-25

## 2019-01-25 RX ORDER — SODIUM CHLORIDE, SODIUM LACTATE, POTASSIUM CHLORIDE, CALCIUM CHLORIDE 600; 310; 30; 20 MG/100ML; MG/100ML; MG/100ML; MG/100ML
INJECTION, SOLUTION INTRAVENOUS CONTINUOUS
Status: DISCONTINUED | OUTPATIENT
Start: 2019-01-25 | End: 2019-01-25 | Stop reason: HOSPADM

## 2019-01-25 RX ORDER — ROCURONIUM BROMIDE 10 MG/ML
INJECTION, SOLUTION INTRAVENOUS PRN
Status: DISCONTINUED | OUTPATIENT
Start: 2019-01-25 | End: 2019-01-25 | Stop reason: SDUPTHER

## 2019-01-25 RX ORDER — FENTANYL CITRATE 50 UG/ML
50 INJECTION, SOLUTION INTRAMUSCULAR; INTRAVENOUS EVERY 5 MIN PRN
Status: COMPLETED | OUTPATIENT
Start: 2019-01-25 | End: 2019-01-25

## 2019-01-25 RX ORDER — LIDOCAINE HYDROCHLORIDE 10 MG/ML
INJECTION, SOLUTION INFILTRATION; PERINEURAL PRN
Status: DISCONTINUED | OUTPATIENT
Start: 2019-01-25 | End: 2019-01-25 | Stop reason: SDUPTHER

## 2019-01-25 RX ORDER — GLYCOPYRROLATE 1 MG/5 ML
SYRINGE (ML) INTRAVENOUS PRN
Status: DISCONTINUED | OUTPATIENT
Start: 2019-01-25 | End: 2019-01-25 | Stop reason: SDUPTHER

## 2019-01-25 RX ORDER — HYDROCODONE BITARTRATE AND ACETAMINOPHEN 5; 325 MG/1; MG/1
1 TABLET ORAL EVERY 4 HOURS PRN
Qty: 42 TABLET | Refills: 0 | Status: SHIPPED | OUTPATIENT
Start: 2019-01-25 | End: 2019-02-04 | Stop reason: SDUPTHER

## 2019-01-25 RX ORDER — ONDANSETRON 2 MG/ML
INJECTION INTRAMUSCULAR; INTRAVENOUS PRN
Status: DISCONTINUED | OUTPATIENT
Start: 2019-01-25 | End: 2019-01-25 | Stop reason: SDUPTHER

## 2019-01-25 RX ORDER — METOPROLOL TARTRATE 5 MG/5ML
INJECTION INTRAVENOUS PRN
Status: DISCONTINUED | OUTPATIENT
Start: 2019-01-25 | End: 2019-01-25 | Stop reason: SDUPTHER

## 2019-01-25 RX ADMIN — FENTANYL CITRATE 50 MCG: 50 INJECTION, SOLUTION INTRAMUSCULAR; INTRAVENOUS at 18:35

## 2019-01-25 RX ADMIN — FENTANYL CITRATE 50 MCG: 50 INJECTION, SOLUTION INTRAMUSCULAR; INTRAVENOUS at 18:28

## 2019-01-25 RX ADMIN — ROCURONIUM BROMIDE 30 MG: 10 INJECTION INTRAVENOUS at 16:57

## 2019-01-25 RX ADMIN — FENTANYL CITRATE 50 MCG: 50 INJECTION INTRAMUSCULAR; INTRAVENOUS at 16:57

## 2019-01-25 RX ADMIN — SODIUM CHLORIDE, POTASSIUM CHLORIDE, SODIUM LACTATE AND CALCIUM CHLORIDE: 600; 310; 30; 20 INJECTION, SOLUTION INTRAVENOUS at 13:00

## 2019-01-25 RX ADMIN — METOPROLOL TARTRATE 1 MG: 1 INJECTION, SOLUTION INTRAVENOUS at 17:31

## 2019-01-25 RX ADMIN — NEOSTIGMINE METHYLSULFATE 1 MG: 1 INJECTION, SOLUTION INTRAMUSCULAR; INTRAVENOUS; SUBCUTANEOUS at 17:49

## 2019-01-25 RX ADMIN — NEOSTIGMINE METHYLSULFATE 3 MG: 1 INJECTION, SOLUTION INTRAMUSCULAR; INTRAVENOUS; SUBCUTANEOUS at 17:42

## 2019-01-25 RX ADMIN — FENTANYL CITRATE 50 MCG: 50 INJECTION, SOLUTION INTRAMUSCULAR; INTRAVENOUS at 18:45

## 2019-01-25 RX ADMIN — Medication 0.4 MG: at 17:42

## 2019-01-25 RX ADMIN — Medication 0.2 MG: at 17:49

## 2019-01-25 RX ADMIN — LIDOCAINE HYDROCHLORIDE 50 MG: 10 INJECTION, SOLUTION INFILTRATION; PERINEURAL at 16:57

## 2019-01-25 RX ADMIN — METOPROLOL TARTRATE 1 MG: 1 INJECTION, SOLUTION INTRAVENOUS at 17:55

## 2019-01-25 RX ADMIN — PROPOFOL 60 MG: 10 INJECTION, EMULSION INTRAVENOUS at 16:57

## 2019-01-25 RX ADMIN — METOPROLOL TARTRATE 1 MG: 1 INJECTION, SOLUTION INTRAVENOUS at 17:38

## 2019-01-25 RX ADMIN — HYDROCODONE BITARTRATE AND ACETAMINOPHEN 1 TABLET: 5; 325 TABLET ORAL at 19:08

## 2019-01-25 RX ADMIN — FENTANYL CITRATE 50 MCG: 50 INJECTION, SOLUTION INTRAMUSCULAR; INTRAVENOUS at 18:20

## 2019-01-25 RX ADMIN — ONDANSETRON 4 MG: 2 INJECTION, SOLUTION INTRAMUSCULAR; INTRAVENOUS at 17:40

## 2019-01-25 RX ADMIN — CLINDAMYCIN PHOSPHATE 900 MG: 900 INJECTION, SOLUTION INTRAVENOUS at 17:03

## 2019-01-25 ASSESSMENT — PULMONARY FUNCTION TESTS
PIF_VALUE: 2
PIF_VALUE: 19
PIF_VALUE: 19
PIF_VALUE: 23
PIF_VALUE: 23
PIF_VALUE: 21
PIF_VALUE: 21
PIF_VALUE: 2
PIF_VALUE: 1
PIF_VALUE: 21
PIF_VALUE: 23
PIF_VALUE: 1
PIF_VALUE: 2
PIF_VALUE: 15
PIF_VALUE: 3
PIF_VALUE: 23
PIF_VALUE: 21
PIF_VALUE: 15
PIF_VALUE: 1
PIF_VALUE: 1
PIF_VALUE: 21
PIF_VALUE: 23
PIF_VALUE: 2
PIF_VALUE: 21
PIF_VALUE: 23
PIF_VALUE: 2
PIF_VALUE: 15
PIF_VALUE: 22
PIF_VALUE: 0
PIF_VALUE: 21
PIF_VALUE: 2
PIF_VALUE: 21
PIF_VALUE: 21
PIF_VALUE: 2
PIF_VALUE: 24
PIF_VALUE: 2
PIF_VALUE: 21
PIF_VALUE: 23
PIF_VALUE: 21
PIF_VALUE: 23
PIF_VALUE: 22
PIF_VALUE: 1
PIF_VALUE: 23
PIF_VALUE: 2
PIF_VALUE: 23
PIF_VALUE: 21
PIF_VALUE: 21
PIF_VALUE: 23
PIF_VALUE: 2
PIF_VALUE: 23
PIF_VALUE: 21
PIF_VALUE: 22
PIF_VALUE: 21
PIF_VALUE: 21
PIF_VALUE: 23
PIF_VALUE: 21
PIF_VALUE: 23
PIF_VALUE: 22
PIF_VALUE: 23
PIF_VALUE: 23
PIF_VALUE: 21
PIF_VALUE: 21
PIF_VALUE: 23
PIF_VALUE: 16
PIF_VALUE: 23
PIF_VALUE: 23
PIF_VALUE: 21
PIF_VALUE: 1
PIF_VALUE: 23
PIF_VALUE: 21
PIF_VALUE: 23

## 2019-01-25 ASSESSMENT — PAIN - FUNCTIONAL ASSESSMENT: PAIN_FUNCTIONAL_ASSESSMENT: 0-10

## 2019-01-25 ASSESSMENT — PAIN DESCRIPTION - ORIENTATION: ORIENTATION: LEFT

## 2019-01-25 ASSESSMENT — PAIN DESCRIPTION - LOCATION: LOCATION: HAND

## 2019-01-25 ASSESSMENT — PAIN DESCRIPTION - FREQUENCY: FREQUENCY: CONTINUOUS

## 2019-01-25 ASSESSMENT — PAIN SCALES - GENERAL
PAINLEVEL_OUTOF10: 10
PAINLEVEL_OUTOF10: 10
PAINLEVEL_OUTOF10: 8
PAINLEVEL_OUTOF10: 3
PAINLEVEL_OUTOF10: 5
PAINLEVEL_OUTOF10: 8
PAINLEVEL_OUTOF10: 4
PAINLEVEL_OUTOF10: 3

## 2019-01-25 ASSESSMENT — PAIN DESCRIPTION - DESCRIPTORS: DESCRIPTORS: BURNING

## 2019-01-25 ASSESSMENT — PAIN DESCRIPTION - PROGRESSION: CLINICAL_PROGRESSION: GRADUALLY WORSENING

## 2019-01-25 ASSESSMENT — PAIN DESCRIPTION - PAIN TYPE: TYPE: SURGICAL PAIN

## 2019-01-26 ENCOUNTER — HOSPITAL ENCOUNTER (EMERGENCY)
Age: 84
Discharge: HOME | End: 2019-01-26
Attending: EMERGENCY MEDICINE
Payer: MEDICARE

## 2019-01-26 VITALS
SYSTOLIC BLOOD PRESSURE: 133 MMHG | DIASTOLIC BLOOD PRESSURE: 81 MMHG | HEART RATE: 63 BPM | RESPIRATION RATE: 16 BRPM | TEMPERATURE: 98.2 F | OXYGEN SATURATION: 96 %

## 2019-01-26 DIAGNOSIS — M79.602 LEFT ARM PAIN: Primary | ICD-10-CM

## 2019-01-26 PROCEDURE — G0382 LEV 3 HOSP TYPE B ED VISIT: HCPCS

## 2019-01-26 ASSESSMENT — ENCOUNTER SYMPTOMS
NAUSEA: 0
DIARRHEA: 0
SORE THROAT: 0
ABDOMINAL PAIN: 0
CONSTIPATION: 0
COUGH: 0
EYE PAIN: 0
SHORTNESS OF BREATH: 0
VOMITING: 0

## 2019-02-04 DIAGNOSIS — S62.102A CLOSED FRACTURE OF LEFT WRIST, INITIAL ENCOUNTER: ICD-10-CM

## 2019-02-04 RX ORDER — HYDROCODONE BITARTRATE AND ACETAMINOPHEN 5; 325 MG/1; MG/1
1 TABLET ORAL EVERY 8 HOURS PRN
Qty: 21 TABLET | Refills: 0 | Status: SHIPPED | OUTPATIENT
Start: 2019-02-04 | End: 2019-02-11

## 2019-02-06 ENCOUNTER — TELEPHONE (OUTPATIENT)
Dept: ORTHOPEDIC SURGERY | Age: 84
End: 2019-02-06

## 2019-02-14 ENCOUNTER — OFFICE VISIT (OUTPATIENT)
Dept: ORTHOPEDIC SURGERY | Age: 84
End: 2019-02-14

## 2019-02-14 VITALS — BODY MASS INDEX: 28.35 KG/M2 | WEIGHT: 160 LBS | HEIGHT: 63 IN

## 2019-02-14 DIAGNOSIS — S52.532D CLOSED COLLES' FRACTURE OF LEFT RADIUS WITH ROUTINE HEALING, SUBSEQUENT ENCOUNTER: Primary | ICD-10-CM

## 2019-02-14 PROCEDURE — 99024 POSTOP FOLLOW-UP VISIT: CPT | Performed by: ORTHOPAEDIC SURGERY

## 2019-02-19 DIAGNOSIS — S62.102D CLOSED FRACTURE OF LEFT WRIST WITH ROUTINE HEALING, SUBSEQUENT ENCOUNTER: Primary | ICD-10-CM

## 2019-02-20 ENCOUNTER — OFFICE VISIT (OUTPATIENT)
Dept: ORTHOPEDIC SURGERY | Age: 84
End: 2019-02-20
Payer: MEDICARE

## 2019-02-20 VITALS — WEIGHT: 160.05 LBS | BODY MASS INDEX: 28.36 KG/M2 | HEIGHT: 63 IN

## 2019-02-20 DIAGNOSIS — G89.18 POST-OP PAIN: ICD-10-CM

## 2019-02-20 DIAGNOSIS — S62.102D CLOSED FRACTURE OF LEFT WRIST WITH ROUTINE HEALING, SUBSEQUENT ENCOUNTER: Primary | ICD-10-CM

## 2019-02-20 PROCEDURE — 99213 OFFICE O/P EST LOW 20 MIN: CPT | Performed by: STUDENT IN AN ORGANIZED HEALTH CARE EDUCATION/TRAINING PROGRAM

## 2019-02-20 RX ORDER — HYDROCODONE BITARTRATE AND ACETAMINOPHEN 5; 325 MG/1; MG/1
1 TABLET ORAL EVERY 8 HOURS PRN
Qty: 21 TABLET | Refills: 0 | Status: SHIPPED | OUTPATIENT
Start: 2019-02-20 | End: 2019-02-27

## 2019-03-13 ENCOUNTER — OFFICE VISIT (OUTPATIENT)
Dept: ORTHOPEDIC SURGERY | Age: 84
End: 2019-03-13

## 2019-03-13 VITALS — BODY MASS INDEX: 28.36 KG/M2 | WEIGHT: 160.05 LBS | HEIGHT: 63 IN

## 2019-03-13 DIAGNOSIS — S62.102D CLOSED FRACTURE OF LEFT WRIST WITH ROUTINE HEALING, SUBSEQUENT ENCOUNTER: Primary | ICD-10-CM

## 2019-03-13 PROCEDURE — 99024 POSTOP FOLLOW-UP VISIT: CPT | Performed by: ORTHOPAEDIC SURGERY

## 2019-03-25 PROBLEM — Z01.810 PREOP CARDIOVASCULAR EXAM: Status: ACTIVE | Noted: 2019-03-25

## 2019-04-24 PROBLEM — Z01.810 PREOP CARDIOVASCULAR EXAM: Status: RESOLVED | Noted: 2019-03-25 | Resolved: 2019-04-24

## 2019-10-24 ENCOUNTER — HOSPITAL ENCOUNTER (EMERGENCY)
Facility: CLINIC | Age: 84
Discharge: HOME | End: 2019-10-24
Attending: EMERGENCY MEDICINE
Payer: MEDICARE

## 2019-10-24 ENCOUNTER — APPOINTMENT (OUTPATIENT)
Dept: GENERAL RADIOLOGY | Facility: CLINIC | Age: 84
End: 2019-10-24
Payer: MEDICARE

## 2019-10-24 VITALS
WEIGHT: 138 LBS | DIASTOLIC BLOOD PRESSURE: 58 MMHG | TEMPERATURE: 97.4 F | OXYGEN SATURATION: 95 % | SYSTOLIC BLOOD PRESSURE: 118 MMHG | BODY MASS INDEX: 24.45 KG/M2 | RESPIRATION RATE: 16 BRPM | HEART RATE: 64 BPM | HEIGHT: 63 IN

## 2019-10-24 DIAGNOSIS — J44.1 COPD EXACERBATION (HCC): Primary | ICD-10-CM

## 2019-10-24 LAB
ABSOLUTE EOS #: 0.1 K/UL (ref 0–0.4)
ABSOLUTE IMMATURE GRANULOCYTE: ABNORMAL K/UL (ref 0–0.3)
ABSOLUTE LYMPH #: 0.9 K/UL (ref 1–4.8)
ABSOLUTE MONO #: 0.6 K/UL (ref 0.1–1.2)
ANION GAP: 17 MMOL/L (ref 8–16)
BASOPHILS # BLD: 1 % (ref 0–2)
BASOPHILS ABSOLUTE: 0 K/UL (ref 0–0.2)
DIFFERENTIAL TYPE: ABNORMAL
EOSINOPHILS RELATIVE PERCENT: 2 % (ref 1–4)
GLUCOSE BLD-MCNC: 136 MG/DL (ref 74–106)
HCT VFR BLD CALC: 34 % (ref 36–46)
HEMOGLOBIN: 11.1 G/DL (ref 12–16)
IMMATURE GRANULOCYTES: ABNORMAL %
LACTIC ACID: 1.4 MMOL/L (ref 0.5–2.2)
LYMPHOCYTES # BLD: 21 % (ref 24–44)
MCH RBC QN AUTO: 31.6 PG (ref 26–34)
MCHC RBC AUTO-ENTMCNC: 32.6 G/DL (ref 31–37)
MCV RBC AUTO: 96.9 FL (ref 80–100)
MONOCYTES # BLD: 14 % (ref 2–11)
NRBC AUTOMATED: ABNORMAL PER 100 WBC
PDW BLD-RTO: 13 % (ref 12.5–15.4)
PLATELET # BLD: 154 K/UL (ref 140–450)
PLATELET ESTIMATE: ABNORMAL
PMV BLD AUTO: 8.3 FL (ref 6–12)
POC BUN: 14 MG/DL (ref 6–20)
POC CHLORIDE: 99 MMOL/L (ref 98–110)
POC CREATININE: 0.7 MG/DL (ref 0.6–1.4)
POC IONIZED CALCIUM: 1.14 MMOL/L (ref 1.13–1.33)
POC POTASSIUM: 4.1 MMOL/L (ref 3.5–5.1)
POC SODIUM: 136 MMOL/L (ref 136–145)
POC TCO2: 25 MMOL/L (ref 20–31)
RBC # BLD: 3.51 M/UL (ref 4–5.2)
RBC # BLD: ABNORMAL 10*6/UL
SEG NEUTROPHILS: 62 % (ref 36–66)
SEGMENTED NEUTROPHILS ABSOLUTE COUNT: 2.6 K/UL (ref 1.8–7.7)
TROPONIN INTERP: NORMAL
TROPONIN T: NORMAL NG/ML
TROPONIN, HIGH SENSITIVITY: 6 NG/L (ref 0–14)
WBC # BLD: 4.3 K/UL (ref 3.5–11)
WBC # BLD: ABNORMAL 10*3/UL

## 2019-10-24 PROCEDURE — 99285 EMERGENCY DEPT VISIT HI MDM: CPT

## 2019-10-24 PROCEDURE — 84484 ASSAY OF TROPONIN QUANT: CPT

## 2019-10-24 PROCEDURE — 87040 BLOOD CULTURE FOR BACTERIA: CPT

## 2019-10-24 PROCEDURE — 6370000000 HC RX 637 (ALT 250 FOR IP): Performed by: EMERGENCY MEDICINE

## 2019-10-24 PROCEDURE — 80047 BASIC METABLC PNL IONIZED CA: CPT

## 2019-10-24 PROCEDURE — 36415 COLL VENOUS BLD VENIPUNCTURE: CPT

## 2019-10-24 PROCEDURE — 93005 ELECTROCARDIOGRAM TRACING: CPT | Performed by: EMERGENCY MEDICINE

## 2019-10-24 PROCEDURE — 83605 ASSAY OF LACTIC ACID: CPT

## 2019-10-24 PROCEDURE — 85025 COMPLETE CBC W/AUTO DIFF WBC: CPT

## 2019-10-24 PROCEDURE — 71046 X-RAY EXAM CHEST 2 VIEWS: CPT

## 2019-10-24 RX ORDER — PREDNISONE 10 MG/1
TABLET ORAL
Qty: 20 TABLET | Refills: 0 | Status: SHIPPED | OUTPATIENT
Start: 2019-10-24 | End: 2019-11-03

## 2019-10-24 RX ORDER — IPRATROPIUM BROMIDE AND ALBUTEROL SULFATE 2.5; .5 MG/3ML; MG/3ML
1 SOLUTION RESPIRATORY (INHALATION) ONCE
Status: COMPLETED | OUTPATIENT
Start: 2019-10-24 | End: 2019-10-24

## 2019-10-24 RX ORDER — AZITHROMYCIN 250 MG/1
TABLET, FILM COATED ORAL
Qty: 1 PACKET | Refills: 0 | Status: SHIPPED | OUTPATIENT
Start: 2019-10-24 | End: 2019-11-03

## 2019-10-24 RX ADMIN — IPRATROPIUM BROMIDE AND ALBUTEROL SULFATE 1 AMPULE: .5; 3 SOLUTION RESPIRATORY (INHALATION) at 15:41

## 2019-10-24 ASSESSMENT — ENCOUNTER SYMPTOMS
BACK PAIN: 0
WHEEZING: 1
VOMITING: 0
RHINORRHEA: 1
NAUSEA: 0
EYE PAIN: 0
DIARRHEA: 0
COUGH: 1
SHORTNESS OF BREATH: 1
ABDOMINAL PAIN: 0

## 2019-10-25 LAB
EKG ATRIAL RATE: 416 BPM
EKG Q-T INTERVAL: 384 MS
EKG QRS DURATION: 80 MS
EKG QTC CALCULATION (BAZETT): 411 MS
EKG R AXIS: 15 DEGREES
EKG T AXIS: -104 DEGREES
EKG VENTRICULAR RATE: 69 BPM

## 2019-10-30 LAB
CULTURE: NORMAL
CULTURE: NORMAL
Lab: NORMAL
Lab: NORMAL
SPECIMEN DESCRIPTION: NORMAL
SPECIMEN DESCRIPTION: NORMAL

## 2020-11-03 PROBLEM — I10 ESSENTIAL HYPERTENSION: Status: RESOLVED | Noted: 2020-11-03 | Resolved: 2020-11-03

## 2021-06-21 ENCOUNTER — APPOINTMENT (OUTPATIENT)
Dept: CT IMAGING | Age: 86
End: 2021-06-21
Payer: MEDICARE

## 2021-06-21 ENCOUNTER — HOSPITAL ENCOUNTER (OUTPATIENT)
Age: 86
Setting detail: OBSERVATION
Discharge: HOME HEALTH | End: 2021-06-24
Attending: INTERNAL MEDICINE | Admitting: INTERNAL MEDICINE
Payer: MEDICARE

## 2021-06-21 ENCOUNTER — APPOINTMENT (OUTPATIENT)
Dept: GENERAL RADIOLOGY | Age: 86
End: 2021-06-21
Payer: MEDICARE

## 2021-06-21 DIAGNOSIS — R42 DIZZINESS: Primary | ICD-10-CM

## 2021-06-21 DIAGNOSIS — N30.00 ACUTE CYSTITIS WITHOUT HEMATURIA: ICD-10-CM

## 2021-06-21 PROBLEM — N39.0 UTI (URINARY TRACT INFECTION): Status: ACTIVE | Noted: 2021-06-21

## 2021-06-21 LAB
-: ABNORMAL
ABSOLUTE EOS #: 0.07 K/UL (ref 0–0.44)
ABSOLUTE IMMATURE GRANULOCYTE: 0.01 K/UL (ref 0–0.3)
ABSOLUTE LYMPH #: 1.39 K/UL (ref 1.1–3.7)
ABSOLUTE MONO #: 0.63 K/UL (ref 0.1–1.2)
AMORPHOUS: ABNORMAL
ANION GAP SERPL CALCULATED.3IONS-SCNC: 11 MMOL/L (ref 9–17)
BACTERIA: ABNORMAL
BASOPHILS # BLD: 1 % (ref 0–2)
BASOPHILS ABSOLUTE: 0.03 K/UL (ref 0–0.2)
BILIRUBIN URINE: NEGATIVE
BNP INTERPRETATION: ABNORMAL
BUN BLDV-MCNC: 17 MG/DL (ref 8–23)
BUN/CREAT BLD: 22 (ref 9–20)
CALCIUM SERPL-MCNC: 9.1 MG/DL (ref 8.6–10.4)
CASTS UA: ABNORMAL /LPF
CHLORIDE BLD-SCNC: 94 MMOL/L (ref 98–107)
CO2: 26 MMOL/L (ref 20–31)
COLOR: YELLOW
COMMENT UA: ABNORMAL
CREAT SERPL-MCNC: 0.78 MG/DL (ref 0.5–0.9)
CRYSTALS, UA: ABNORMAL /HPF
DIFFERENTIAL TYPE: ABNORMAL
EOSINOPHILS RELATIVE PERCENT: 2 % (ref 1–4)
EPITHELIAL CELLS UA: ABNORMAL /HPF (ref 0–5)
GFR AFRICAN AMERICAN: >60 ML/MIN
GFR NON-AFRICAN AMERICAN: >60 ML/MIN
GFR SERPL CREATININE-BSD FRML MDRD: ABNORMAL ML/MIN/{1.73_M2}
GFR SERPL CREATININE-BSD FRML MDRD: ABNORMAL ML/MIN/{1.73_M2}
GLUCOSE BLD-MCNC: 103 MG/DL (ref 70–99)
GLUCOSE URINE: NEGATIVE
HCT VFR BLD CALC: 33.9 % (ref 36.3–47.1)
HEMOGLOBIN: 10.7 G/DL (ref 11.9–15.1)
IMMATURE GRANULOCYTES: 0 %
KETONES, URINE: NEGATIVE
LACTIC ACID: 0.6 MMOL/L (ref 0.5–2.2)
LEUKOCYTE ESTERASE, URINE: ABNORMAL
LYMPHOCYTES # BLD: 32 % (ref 24–43)
MCH RBC QN AUTO: 31.2 PG (ref 25.2–33.5)
MCHC RBC AUTO-ENTMCNC: 31.6 G/DL (ref 28.4–34.8)
MCV RBC AUTO: 98.8 FL (ref 82.6–102.9)
MONOCYTES # BLD: 15 % (ref 3–12)
MUCUS: ABNORMAL
MYOGLOBIN: 35 NG/ML (ref 25–58)
NITRITE, URINE: NEGATIVE
NRBC AUTOMATED: 0 PER 100 WBC
OTHER OBSERVATIONS UA: ABNORMAL
PDW BLD-RTO: 13.9 % (ref 11.8–14.4)
PH UA: 7 (ref 5–8)
PLATELET # BLD: 179 K/UL (ref 138–453)
PLATELET ESTIMATE: ABNORMAL
PMV BLD AUTO: 9.7 FL (ref 8.1–13.5)
POTASSIUM SERPL-SCNC: 4.6 MMOL/L (ref 3.7–5.3)
PRO-BNP: 1592 PG/ML
PROTEIN UA: NEGATIVE
RBC # BLD: 3.43 M/UL (ref 3.95–5.11)
RBC # BLD: ABNORMAL 10*6/UL
RBC UA: ABNORMAL /HPF (ref 0–2)
RENAL EPITHELIAL, UA: ABNORMAL /HPF
SEG NEUTROPHILS: 50 % (ref 36–65)
SEGMENTED NEUTROPHILS ABSOLUTE COUNT: 2.18 K/UL (ref 1.5–8.1)
SODIUM BLD-SCNC: 131 MMOL/L (ref 135–144)
SPECIFIC GRAVITY UA: 1.01 (ref 1–1.03)
TRICHOMONAS: ABNORMAL
TROPONIN INTERP: NORMAL
TROPONIN T: NORMAL NG/ML
TROPONIN, HIGH SENSITIVITY: 9 NG/L (ref 0–14)
TURBIDITY: CLEAR
URINE HGB: ABNORMAL
UROBILINOGEN, URINE: NORMAL
WBC # BLD: 4.3 K/UL (ref 3.5–11.3)
WBC # BLD: ABNORMAL 10*3/UL
WBC UA: ABNORMAL /HPF (ref 0–5)
YEAST: ABNORMAL

## 2021-06-21 PROCEDURE — 71045 X-RAY EXAM CHEST 1 VIEW: CPT

## 2021-06-21 PROCEDURE — 70450 CT HEAD/BRAIN W/O DYE: CPT

## 2021-06-21 PROCEDURE — 85025 COMPLETE CBC W/AUTO DIFF WBC: CPT

## 2021-06-21 PROCEDURE — 99284 EMERGENCY DEPT VISIT MOD MDM: CPT

## 2021-06-21 PROCEDURE — 80048 BASIC METABOLIC PNL TOTAL CA: CPT

## 2021-06-21 PROCEDURE — G0378 HOSPITAL OBSERVATION PER HR: HCPCS

## 2021-06-21 PROCEDURE — 1200000000 HC SEMI PRIVATE

## 2021-06-21 PROCEDURE — 83605 ASSAY OF LACTIC ACID: CPT

## 2021-06-21 PROCEDURE — 6370000000 HC RX 637 (ALT 250 FOR IP): Performed by: EMERGENCY MEDICINE

## 2021-06-21 PROCEDURE — 6360000002 HC RX W HCPCS: Performed by: EMERGENCY MEDICINE

## 2021-06-21 PROCEDURE — 6370000000 HC RX 637 (ALT 250 FOR IP): Performed by: INTERNAL MEDICINE

## 2021-06-21 PROCEDURE — 81001 URINALYSIS AUTO W/SCOPE: CPT

## 2021-06-21 PROCEDURE — 87040 BLOOD CULTURE FOR BACTERIA: CPT

## 2021-06-21 PROCEDURE — 96365 THER/PROPH/DIAG IV INF INIT: CPT

## 2021-06-21 PROCEDURE — 83874 ASSAY OF MYOGLOBIN: CPT

## 2021-06-21 PROCEDURE — 83880 ASSAY OF NATRIURETIC PEPTIDE: CPT

## 2021-06-21 PROCEDURE — 93005 ELECTROCARDIOGRAM TRACING: CPT | Performed by: EMERGENCY MEDICINE

## 2021-06-21 PROCEDURE — 84484 ASSAY OF TROPONIN QUANT: CPT

## 2021-06-21 PROCEDURE — 2580000003 HC RX 258: Performed by: EMERGENCY MEDICINE

## 2021-06-21 RX ORDER — FUROSEMIDE 20 MG/1
20 TABLET ORAL DAILY PRN
Status: DISCONTINUED | OUTPATIENT
Start: 2021-06-21 | End: 2021-06-24 | Stop reason: HOSPADM

## 2021-06-21 RX ORDER — MECLIZINE HCL 12.5 MG/1
25 TABLET ORAL ONCE
Status: COMPLETED | OUTPATIENT
Start: 2021-06-21 | End: 2021-06-21

## 2021-06-21 RX ORDER — LANOLIN ALCOHOL/MO/W.PET/CERES
3 CREAM (GRAM) TOPICAL NIGHTLY PRN
Status: DISCONTINUED | OUTPATIENT
Start: 2021-06-21 | End: 2021-06-24 | Stop reason: HOSPADM

## 2021-06-21 RX ORDER — HYDRALAZINE HYDROCHLORIDE 20 MG/ML
5 INJECTION INTRAMUSCULAR; INTRAVENOUS EVERY 6 HOURS PRN
Status: DISCONTINUED | OUTPATIENT
Start: 2021-06-21 | End: 2021-06-24 | Stop reason: HOSPADM

## 2021-06-21 RX ORDER — BUDESONIDE AND FORMOTEROL FUMARATE DIHYDRATE 160; 4.5 UG/1; UG/1
2 AEROSOL RESPIRATORY (INHALATION) 2 TIMES DAILY
Status: DISCONTINUED | OUTPATIENT
Start: 2021-06-21 | End: 2021-06-24 | Stop reason: HOSPADM

## 2021-06-21 RX ORDER — PRAVASTATIN SODIUM 40 MG
40 TABLET ORAL NIGHTLY
Status: DISCONTINUED | OUTPATIENT
Start: 2021-06-21 | End: 2021-06-22

## 2021-06-21 RX ORDER — CARVEDILOL 6.25 MG/1
6.25 TABLET ORAL 2 TIMES DAILY
Status: DISCONTINUED | OUTPATIENT
Start: 2021-06-21 | End: 2021-06-24 | Stop reason: HOSPADM

## 2021-06-21 RX ORDER — SODIUM CHLORIDE 9 MG/ML
25 INJECTION, SOLUTION INTRAVENOUS PRN
Status: DISCONTINUED | OUTPATIENT
Start: 2021-06-21 | End: 2021-06-24 | Stop reason: HOSPADM

## 2021-06-21 RX ORDER — SODIUM CHLORIDE 0.9 % (FLUSH) 0.9 %
5-40 SYRINGE (ML) INJECTION PRN
Status: DISCONTINUED | OUTPATIENT
Start: 2021-06-21 | End: 2021-06-24 | Stop reason: HOSPADM

## 2021-06-21 RX ORDER — ONDANSETRON 4 MG/1
4 TABLET, ORALLY DISINTEGRATING ORAL EVERY 8 HOURS PRN
Status: DISCONTINUED | OUTPATIENT
Start: 2021-06-21 | End: 2021-06-24 | Stop reason: HOSPADM

## 2021-06-21 RX ORDER — SODIUM CHLORIDE 9 MG/ML
INJECTION, SOLUTION INTRAVENOUS CONTINUOUS
Status: DISCONTINUED | OUTPATIENT
Start: 2021-06-21 | End: 2021-06-23

## 2021-06-21 RX ORDER — PANTOPRAZOLE SODIUM 40 MG/1
40 TABLET, DELAYED RELEASE ORAL
Status: DISCONTINUED | OUTPATIENT
Start: 2021-06-22 | End: 2021-06-24 | Stop reason: HOSPADM

## 2021-06-21 RX ORDER — SODIUM CHLORIDE 0.9 % (FLUSH) 0.9 %
5-40 SYRINGE (ML) INJECTION EVERY 12 HOURS SCHEDULED
Status: DISCONTINUED | OUTPATIENT
Start: 2021-06-21 | End: 2021-06-24 | Stop reason: HOSPADM

## 2021-06-21 RX ORDER — ONDANSETRON 2 MG/ML
4 INJECTION INTRAMUSCULAR; INTRAVENOUS EVERY 6 HOURS PRN
Status: DISCONTINUED | OUTPATIENT
Start: 2021-06-21 | End: 2021-06-24 | Stop reason: HOSPADM

## 2021-06-21 RX ORDER — ALBUTEROL SULFATE 90 UG/1
2 AEROSOL, METERED RESPIRATORY (INHALATION) EVERY 6 HOURS PRN
Status: DISCONTINUED | OUTPATIENT
Start: 2021-06-21 | End: 2021-06-24 | Stop reason: HOSPADM

## 2021-06-21 RX ORDER — ACETAMINOPHEN 325 MG/1
650 TABLET ORAL EVERY 4 HOURS PRN
Status: DISCONTINUED | OUTPATIENT
Start: 2021-06-21 | End: 2021-06-24 | Stop reason: HOSPADM

## 2021-06-21 RX ADMIN — CARVEDILOL 6.25 MG: 6.25 TABLET, FILM COATED ORAL at 23:50

## 2021-06-21 RX ADMIN — APIXABAN 2.5 MG: 2.5 TABLET, FILM COATED ORAL at 23:50

## 2021-06-21 RX ADMIN — MECLIZINE 25 MG: 12.5 TABLET ORAL at 20:46

## 2021-06-21 RX ADMIN — SODIUM CHLORIDE: 9 INJECTION, SOLUTION INTRAVENOUS at 18:24

## 2021-06-21 RX ADMIN — PRAVASTATIN SODIUM 40 MG: 40 TABLET ORAL at 23:50

## 2021-06-21 RX ADMIN — Medication 3 MG: at 23:50

## 2021-06-21 RX ADMIN — CEFTRIAXONE SODIUM 1000 MG: 1 INJECTION, POWDER, FOR SOLUTION INTRAMUSCULAR; INTRAVENOUS at 21:40

## 2021-06-21 ASSESSMENT — ENCOUNTER SYMPTOMS
ABDOMINAL PAIN: 0
SHORTNESS OF BREATH: 0
TROUBLE SWALLOWING: 0

## 2021-06-21 ASSESSMENT — PAIN SCALES - GENERAL: PAINLEVEL_OUTOF10: 0

## 2021-06-21 NOTE — ED NOTES
Bed: 07  Expected date:   Expected time:   Means of arrival:   Comments:  [facility] , RN  06/21/21 3163

## 2021-06-21 NOTE — ED PROVIDER NOTES
CAPSULE        PRAVASTATIN (PRAVACHOL) 80 MG TABLET        PSEUDOEPHEDRINE-GUAIFENESIN (MUCINEX D)  MG PER EXTENDED RELEASE TABLET    Take 1 tablet by mouth every 12 hours    SPIRONOLACTONE (ALDACTONE) 25 MG TABLET         ALLERGIES     is allergic to Aníbal Schein tartrate], amoxicillin, bee venom, and metformin and related. FAMILY HISTORY     She indicated that her mother is . She indicated that her father is . SOCIAL HISTORY       Social History     Tobacco Use    Smoking status: Never Smoker    Smokeless tobacco: Never Used   Vaping Use    Vaping Use: Never used   Substance Use Topics    Alcohol use: No     Alcohol/week: 0.0 standard drinks    Drug use: No     PHYSICAL EXAM     INITIAL VITALS: BP (!) 163/73   Pulse 62   Temp 97.5 °F (36.4 °C)   Resp 13   Ht 5' 2\" (1.575 m)   Wt 118 lb (53.5 kg)   SpO2 99%   BMI 21.58 kg/m²    Physical Exam  Vitals and nursing note reviewed. Constitutional:       General: She is not in acute distress. Appearance: She is not ill-appearing, toxic-appearing or diaphoretic. HENT:      Head: Normocephalic and atraumatic. Mouth/Throat:      Mouth: Mucous membranes are moist.      Pharynx: Oropharynx is clear. Eyes:      Extraocular Movements: Extraocular movements intact. Pupils: Pupils are equal, round, and reactive to light. Cardiovascular:      Rate and Rhythm: Normal rate and regular rhythm. Pulses: Normal pulses. Pulmonary:      Effort: Pulmonary effort is normal. No respiratory distress. Abdominal:      Palpations: Abdomen is soft. Tenderness: There is no abdominal tenderness. Musculoskeletal:         General: No deformity. Normal range of motion. Cervical back: Normal range of motion and neck supple. No rigidity. Right lower leg: No edema. Left lower leg: No edema. Skin:     General: Skin is warm and dry. Capillary Refill: Capillary refill takes less than 2 seconds. Neurological:      General: No focal deficit present. Mental Status: She is alert and oriented to person, place, and time. GCS: GCS eye subscore is 4. GCS verbal subscore is 5. GCS motor subscore is 6. Cranial Nerves: No cranial nerve deficit, dysarthria or facial asymmetry. Sensory: Sensation is intact. Motor: Motor function is intact. No pronator drift. Coordination: Finger-Nose-Finger Test normal.   Psychiatric:         Thought Content: Thought content normal.         MEDICAL DECISION MAKING:          Please see ED Course below for MDM/ED course. DDx: Intracranial bleed, arrhythmia, infection, electrolyte imbalance    All patient's question's and concerns were answered prior to disposition and patient and/or family expressed understanding and agreement of treatment plan. NIH STROKE SCALE:            PROCEDURES:    Procedures    DIAGNOSTIC RESULTS   EKG:All EKG's are interpreted by the Emergency Department Physician who either signs or Co-signs this chart in the absence of a cardiologist.    Ventricular paced rhythm rate of 64 QRS is 168  left axis, ST and T wave abnormality abnormal EKG; compared to prior on 10/24/2019 no significant changes    RADIOLOGY:All plain film, CT, MRI, and formal ultrasound images (except ED bedside ultrasound) are read by the radiologist, see reports below, unless otherwisenoted in MDM or here. CT HEAD WO CONTRAST   Final Result   No acute intracranial abnormality. XR CHEST 1 VIEW   Final Result   Slight increase in interstitial markings in the right lung base compared to   the prior study. LABS: All lab results were reviewed by myself, and all abnormals are listed below.   Labs Reviewed   CBC WITH AUTO DIFFERENTIAL - Abnormal; Notable for the following components:       Result Value    RBC 3.43 (*)     Hemoglobin 10.7 (*)     Hematocrit 33.9 (*)     Monocytes 15 (*)     All other components within normal limits BASIC METABOLIC PANEL W/ REFLEX TO MG FOR LOW K - Abnormal; Notable for the following components:    Glucose 103 (*)     Bun/Cre Ratio 22 (*)     Sodium 131 (*)     Chloride 94 (*)     All other components within normal limits   BRAIN NATRIURETIC PEPTIDE - Abnormal; Notable for the following components:    Pro-BNP 1,592 (*)     All other components within normal limits   URINE RT REFLEX TO CULTURE - Abnormal; Notable for the following components:    Urine Hgb TRACE (*)     Leukocyte Esterase, Urine SMALL (*)     All other components within normal limits   MICROSCOPIC URINALYSIS - Abnormal; Notable for the following components:    Bacteria, UA FEW (*)     All other components within normal limits   CULTURE, BLOOD 1   CULTURE, BLOOD 1   TROP/MYOGLOBIN   LACTIC ACID   POCT URINALYSIS DIPSTICK       EMERGENCY DEPARTMENTCOURSE:     Patient is an 71-year-old female here with dizziness that is now resolved. She has a history of heart disease she is on Eliquis. Vitals are stable she is hypertensive she has a paced rhythm on the monitor afebrile nontoxic. Neurologically intact. Will check labs urine CT head and chest x-ray and EKG and reassess. Unable to interrogate pacemaker with her current machine. Otherwise blood work shows normal white counts no significant anemia sodium is 131 BNP is elevated, urine concerning for UTI. This may be the source of her symptoms. She is still symptomatic when up with the nurse she was very off balance and dizzy. Will try Antivert, will give Rocephin and will admit. Spoke with Dr. Luz Elena Devries who accepts patient for admission.     Vitals:    Vitals:    06/21/21 1749   BP: (!) 163/73   Pulse: 62   Resp: 13   Temp: 97.5 °F (36.4 °C)   SpO2: 99%   Weight: 118 lb (53.5 kg)   Height: 5' 2\" (1.575 m)       The patient was given the following medications while in the emergency department:  Orders Placed This Encounter   Medications    0.9 % sodium chloride infusion    meclizine (ANTIVERT) tablet 25 mg    cefTRIAXone (ROCEPHIN) 1000 mg IVPB in 50 mL D5W minibag     Order Specific Question:   Antimicrobial Indications     Answer:   Urinary Tract Infection     CONSULTS:  IP CONSULT TO HOSPITALIST    FINAL IMPRESSION      1. Dizziness    2. Acute cystitis without hematuria          DISPOSITION/PLAN   DISPOSITION  Decision to admit      PATIENT REFERRED TO:  No follow-up provider specified. DISCHARGE MEDICATIONS:  New Prescriptions    No medications on file     Jannet Shepard MD  Attending Emergency Physician    This note was created with the assistance of a speech-recognition program. While intending to generate a document that actually reflects the content of the visit, no guarantees can be provided that every mistake has been identified and corrected by editing.                     Jannet Shepard MD  06/21/21 9170

## 2021-06-22 PROBLEM — E43 SEVERE MALNUTRITION (HCC): Status: ACTIVE | Noted: 2021-06-22

## 2021-06-22 LAB
ABSOLUTE EOS #: 0.1 K/UL (ref 0–0.44)
ABSOLUTE IMMATURE GRANULOCYTE: 0.01 K/UL (ref 0–0.3)
ABSOLUTE LYMPH #: 1.68 K/UL (ref 1.1–3.7)
ABSOLUTE MONO #: 0.64 K/UL (ref 0.1–1.2)
ANION GAP SERPL CALCULATED.3IONS-SCNC: 9 MMOL/L (ref 9–17)
BASOPHILS # BLD: 1 % (ref 0–2)
BASOPHILS ABSOLUTE: 0.04 K/UL (ref 0–0.2)
BUN BLDV-MCNC: 15 MG/DL (ref 8–23)
BUN/CREAT BLD: 24 (ref 9–20)
CALCIUM SERPL-MCNC: 8.7 MG/DL (ref 8.6–10.4)
CHLORIDE BLD-SCNC: 103 MMOL/L (ref 98–107)
CO2: 24 MMOL/L (ref 20–31)
CREAT SERPL-MCNC: 0.63 MG/DL (ref 0.5–0.9)
DIFFERENTIAL TYPE: ABNORMAL
EKG ATRIAL RATE: 58 BPM
EKG Q-T INTERVAL: 458 MS
EKG QRS DURATION: 168 MS
EKG QTC CALCULATION (BAZETT): 472 MS
EKG R AXIS: -72 DEGREES
EKG T AXIS: 82 DEGREES
EKG VENTRICULAR RATE: 64 BPM
EOSINOPHILS RELATIVE PERCENT: 2 % (ref 1–4)
ESTIMATED AVERAGE GLUCOSE: 114 MG/DL
GFR AFRICAN AMERICAN: >60 ML/MIN
GFR NON-AFRICAN AMERICAN: >60 ML/MIN
GFR SERPL CREATININE-BSD FRML MDRD: ABNORMAL ML/MIN/{1.73_M2}
GFR SERPL CREATININE-BSD FRML MDRD: ABNORMAL ML/MIN/{1.73_M2}
GLUCOSE BLD-MCNC: 102 MG/DL (ref 65–105)
GLUCOSE BLD-MCNC: 108 MG/DL (ref 65–105)
GLUCOSE BLD-MCNC: 140 MG/DL (ref 65–105)
GLUCOSE BLD-MCNC: 145 MG/DL (ref 65–105)
GLUCOSE BLD-MCNC: 83 MG/DL (ref 70–99)
HBA1C MFR BLD: 5.6 % (ref 4–6)
HCT VFR BLD CALC: 31 % (ref 36.3–47.1)
HEMOGLOBIN: 10 G/DL (ref 11.9–15.1)
IMMATURE GRANULOCYTES: 0 %
LYMPHOCYTES # BLD: 30 % (ref 24–43)
MCH RBC QN AUTO: 31.4 PG (ref 25.2–33.5)
MCHC RBC AUTO-ENTMCNC: 32.3 G/DL (ref 28.4–34.8)
MCV RBC AUTO: 97.5 FL (ref 82.6–102.9)
MONOCYTES # BLD: 11 % (ref 3–12)
MYOGLOBIN: 41 NG/ML (ref 25–58)
MYOGLOBIN: 44 NG/ML (ref 25–58)
NRBC AUTOMATED: 0 PER 100 WBC
PDW BLD-RTO: 13.8 % (ref 11.8–14.4)
PLATELET # BLD: 149 K/UL (ref 138–453)
PLATELET ESTIMATE: ABNORMAL
PMV BLD AUTO: 9.6 FL (ref 8.1–13.5)
POTASSIUM SERPL-SCNC: 4 MMOL/L (ref 3.7–5.3)
RBC # BLD: 3.18 M/UL (ref 3.95–5.11)
RBC # BLD: ABNORMAL 10*6/UL
SEG NEUTROPHILS: 56 % (ref 36–65)
SEGMENTED NEUTROPHILS ABSOLUTE COUNT: 3.23 K/UL (ref 1.5–8.1)
SODIUM BLD-SCNC: 136 MMOL/L (ref 135–144)
TROPONIN INTERP: NORMAL
TROPONIN INTERP: NORMAL
TROPONIN T: NORMAL NG/ML
TROPONIN T: NORMAL NG/ML
TROPONIN, HIGH SENSITIVITY: 10 NG/L (ref 0–14)
TROPONIN, HIGH SENSITIVITY: 11 NG/L (ref 0–14)
WBC # BLD: 5.7 K/UL (ref 3.5–11.3)
WBC # BLD: ABNORMAL 10*3/UL

## 2021-06-22 PROCEDURE — 83036 HEMOGLOBIN GLYCOSYLATED A1C: CPT

## 2021-06-22 PROCEDURE — 97112 NEUROMUSCULAR REEDUCATION: CPT

## 2021-06-22 PROCEDURE — 2580000003 HC RX 258: Performed by: INTERNAL MEDICINE

## 2021-06-22 PROCEDURE — 80048 BASIC METABOLIC PNL TOTAL CA: CPT

## 2021-06-22 PROCEDURE — 85025 COMPLETE CBC W/AUTO DIFF WBC: CPT

## 2021-06-22 PROCEDURE — 97535 SELF CARE MNGMENT TRAINING: CPT

## 2021-06-22 PROCEDURE — 94640 AIRWAY INHALATION TREATMENT: CPT

## 2021-06-22 PROCEDURE — 96366 THER/PROPH/DIAG IV INF ADDON: CPT

## 2021-06-22 PROCEDURE — 84484 ASSAY OF TROPONIN QUANT: CPT

## 2021-06-22 PROCEDURE — G0378 HOSPITAL OBSERVATION PER HR: HCPCS

## 2021-06-22 PROCEDURE — 2580000003 HC RX 258: Performed by: EMERGENCY MEDICINE

## 2021-06-22 PROCEDURE — 6370000000 HC RX 637 (ALT 250 FOR IP): Performed by: INTERNAL MEDICINE

## 2021-06-22 PROCEDURE — 36415 COLL VENOUS BLD VENIPUNCTURE: CPT

## 2021-06-22 PROCEDURE — 82947 ASSAY GLUCOSE BLOOD QUANT: CPT

## 2021-06-22 PROCEDURE — 97530 THERAPEUTIC ACTIVITIES: CPT

## 2021-06-22 PROCEDURE — 83874 ASSAY OF MYOGLOBIN: CPT

## 2021-06-22 PROCEDURE — 94761 N-INVAS EAR/PLS OXIMETRY MLT: CPT

## 2021-06-22 PROCEDURE — 97116 GAIT TRAINING THERAPY: CPT

## 2021-06-22 PROCEDURE — 97167 OT EVAL HIGH COMPLEX 60 MIN: CPT

## 2021-06-22 PROCEDURE — 6360000002 HC RX W HCPCS: Performed by: INTERNAL MEDICINE

## 2021-06-22 PROCEDURE — 97163 PT EVAL HIGH COMPLEX 45 MIN: CPT

## 2021-06-22 PROCEDURE — 1200000000 HC SEMI PRIVATE

## 2021-06-22 RX ORDER — NICOTINE POLACRILEX 4 MG
15 LOZENGE BUCCAL PRN
Status: DISCONTINUED | OUTPATIENT
Start: 2021-06-22 | End: 2021-06-24 | Stop reason: HOSPADM

## 2021-06-22 RX ORDER — KETOCONAZOLE 20 MG/G
CREAM TOPICAL DAILY
Status: ON HOLD | COMMUNITY
End: 2021-06-24 | Stop reason: HOSPADM

## 2021-06-22 RX ORDER — PRAVASTATIN SODIUM 40 MG
80 TABLET ORAL NIGHTLY
Status: DISCONTINUED | OUTPATIENT
Start: 2021-06-22 | End: 2021-06-24 | Stop reason: HOSPADM

## 2021-06-22 RX ORDER — LANOLIN ALCOHOL/MO/W.PET/CERES
325 CREAM (GRAM) TOPICAL
COMMUNITY

## 2021-06-22 RX ORDER — FLUTICASONE FUROATE, UMECLIDINIUM BROMIDE AND VILANTEROL TRIFENATATE 200; 62.5; 25 UG/1; UG/1; UG/1
1 POWDER RESPIRATORY (INHALATION) DAILY
COMMUNITY

## 2021-06-22 RX ORDER — DEXTROSE MONOHYDRATE 25 G/50ML
12.5 INJECTION, SOLUTION INTRAVENOUS PRN
Status: DISCONTINUED | OUTPATIENT
Start: 2021-06-22 | End: 2021-06-24 | Stop reason: HOSPADM

## 2021-06-22 RX ORDER — DEXTROSE MONOHYDRATE 50 MG/ML
100 INJECTION, SOLUTION INTRAVENOUS PRN
Status: DISCONTINUED | OUTPATIENT
Start: 2021-06-22 | End: 2021-06-24 | Stop reason: HOSPADM

## 2021-06-22 RX ORDER — LISINOPRIL 10 MG/1
10 TABLET ORAL DAILY
COMMUNITY

## 2021-06-22 RX ORDER — IPRATROPIUM BROMIDE 21 UG/1
1-2 SPRAY, METERED NASAL 4 TIMES DAILY PRN
COMMUNITY

## 2021-06-22 RX ADMIN — BUDESONIDE AND FORMOTEROL FUMARATE DIHYDRATE 2 PUFF: 160; 4.5 AEROSOL RESPIRATORY (INHALATION) at 21:34

## 2021-06-22 RX ADMIN — SODIUM CHLORIDE: 9 INJECTION, SOLUTION INTRAVENOUS at 05:12

## 2021-06-22 RX ADMIN — APIXABAN 2.5 MG: 2.5 TABLET, FILM COATED ORAL at 09:29

## 2021-06-22 RX ADMIN — PRAVASTATIN SODIUM 80 MG: 40 TABLET ORAL at 20:10

## 2021-06-22 RX ADMIN — ACETAMINOPHEN 650 MG: 325 TABLET ORAL at 03:07

## 2021-06-22 RX ADMIN — BUDESONIDE AND FORMOTEROL FUMARATE DIHYDRATE 2 PUFF: 160; 4.5 AEROSOL RESPIRATORY (INHALATION) at 07:41

## 2021-06-22 RX ADMIN — SODIUM CHLORIDE: 9 INJECTION, SOLUTION INTRAVENOUS at 16:50

## 2021-06-22 RX ADMIN — APIXABAN 2.5 MG: 2.5 TABLET, FILM COATED ORAL at 20:11

## 2021-06-22 RX ADMIN — INSULIN LISPRO 1 UNITS: 100 INJECTION, SOLUTION INTRAVENOUS; SUBCUTANEOUS at 11:46

## 2021-06-22 RX ADMIN — CARVEDILOL 6.25 MG: 6.25 TABLET, FILM COATED ORAL at 20:12

## 2021-06-22 RX ADMIN — SODIUM CHLORIDE, PRESERVATIVE FREE 10 ML: 5 INJECTION INTRAVENOUS at 20:01

## 2021-06-22 RX ADMIN — CARVEDILOL 6.25 MG: 6.25 TABLET, FILM COATED ORAL at 09:29

## 2021-06-22 RX ADMIN — PANTOPRAZOLE SODIUM 40 MG: 40 TABLET, DELAYED RELEASE ORAL at 06:08

## 2021-06-22 RX ADMIN — CEFTRIAXONE SODIUM 1000 MG: 1 INJECTION, POWDER, FOR SOLUTION INTRAMUSCULAR; INTRAVENOUS at 20:06

## 2021-06-22 RX ADMIN — INSULIN LISPRO 1 UNITS: 100 INJECTION, SOLUTION INTRAVENOUS; SUBCUTANEOUS at 20:38

## 2021-06-22 ASSESSMENT — PAIN SCALES - GENERAL
PAINLEVEL_OUTOF10: 5
PAINLEVEL_OUTOF10: 7
PAINLEVEL_OUTOF10: 0

## 2021-06-22 ASSESSMENT — PAIN DESCRIPTION - PROGRESSION: CLINICAL_PROGRESSION: NOT CHANGED

## 2021-06-22 ASSESSMENT — PAIN DESCRIPTION - FREQUENCY: FREQUENCY: INTERMITTENT

## 2021-06-22 ASSESSMENT — PAIN DESCRIPTION - LOCATION: LOCATION: LEG

## 2021-06-22 ASSESSMENT — PAIN DESCRIPTION - DESCRIPTORS: DESCRIPTORS: ACHING;DISCOMFORT

## 2021-06-22 ASSESSMENT — PAIN DESCRIPTION - ORIENTATION: ORIENTATION: RIGHT;LEFT

## 2021-06-22 ASSESSMENT — PAIN - FUNCTIONAL ASSESSMENT: PAIN_FUNCTIONAL_ASSESSMENT: ACTIVITIES ARE NOT PREVENTED

## 2021-06-22 ASSESSMENT — PAIN DESCRIPTION - ONSET: ONSET: ON-GOING

## 2021-06-22 ASSESSMENT — PAIN DESCRIPTION - PAIN TYPE: TYPE: CHRONIC PAIN

## 2021-06-22 NOTE — PLAN OF CARE
Ed strengthening ex program, NMR, transfers/gait training & safety Ed for fall-prevention, regain of independence & activity tolerance to [facility]

## 2021-06-22 NOTE — PROGRESS NOTES
Transitions of Care Pharmacy Service   Medication Review    The patient's list of current home medications has been reviewed. Source(s) of information: patient, Surescripts refill report, personal med list from recent PCP visit      Please feel free to call me with any questions about this encounter. Thank you. Poppy Lanier [address] [place] [address]   Transitions  Care Pharmacy Service  Phone:  [phone]  Fax: [phone]      Electronically signed by Poppy Lanier [address] [place] [address] on 6/22/2021 at 6:13 PM           Medications Prior to Admission:   ketoconazole (NIZORAL) 2 % cream, Apply topically daily Indications: under breasts  lisinopril (PRINIVIL;ZESTRIL) 10 MG tablet, Take 10 mg by mouth daily  carbamide peroxide (DEBROX) 6.5 % otic solution, Place 5 drops into both ears as needed  ferrous sulfate (FE TABS 325) 325 (65 Fe) MG EC tablet, Take 325 mg by mouth daily (with breakfast)  ipratropium (ATROVENT) 0.03 % nasal spray, 1-2 sprays by Each Nostril route 4 times daily as needed for Rhinitis  Fluticasone-Umeclidin-Vilant (TRELEGY ELLIPTA) 200-62.5-25 MCG/INH AEPB, Inhale 1 puff into the lungs daily  apixaban (ELIQUIS) 5 MG TABS tablet, Take 1 tablet by mouth 2 times daily  acetaminophen-codeine (TYLENOL #3) 300-30 MG per tablet, Take 1-2 tablets by mouth every 4 hours as needed for Pain.    albuterol (PROVENTIL) (2.5 MG/3ML) 0.083% nebulizer solution, Take 2.5 mg by nebulization every 6 hours as needed for Wheezing  pseudoephedrine-guaiFENesin (MUCINEX D)  MG per extended release tablet, Take 1 tablet by mouth 2 times daily as needed for Congestion   albuterol sulfate  (90 Base) MCG/ACT inhaler, Inhale 2 puffs into the lungs every 6 hours as needed for Wheezing  carvedilol (COREG) 6.25 MG tablet, Take 1 tablet by mouth 2 times daily  omeprazole (PRILOSEC) 20 MG delayed release capsule, Take 20 mg by mouth Daily   pravastatin (PRAVACHOL) 80 MG tablet, Take 80 mg by mouth daily   Calcium Carbonate-Vitamin D (CALCIUM + D PO), Take 1 tablet by mouth daily   vitamin D3 (CHOLECALCIFEROL) 25 MCG (1000 UT) TABS tablet, Take 1,000 Units by mouth daily

## 2021-06-22 NOTE — H&P
History and Physical/ admit note      CHIEF COMPLAINT: Dizziness    History of Present Illness: 80-year-old gentlewoman came into the emergency room with acute episode of dizziness vertigo type with unsteadiness no falls no syncope no nausea no vomiting lasted around half an hour came into the emergency room        Past Medical History:   Diagnosis Date    [facility]     CAD (coronary artery disease)     [facility]     COPD [facility]chronic obstructive pulmonary disease) [facility]     [facility]cerebral vascular accident) [facility]     Esophageal reflux     Other and unspecified hyperlipidemia     Other primary cardiomyopathies     Type II or unspecified type diabetes mellitus without mention of complication, not stated as uncontrolled     Unspecified asthma(493.90)     Unspecified essential hypertension          Past Surgical History:   Procedure Laterality Date    CORONARY ARTERY BYPASS GRAFT      HYSTERECTOMY      PACEMAKER PLACEMENT  2014    [employer]    WRIST CLOSED REDUCTION Left 1/25/2019    CAST REMOVED FROM LEFT ARM . LEFT DISTAL RADIUS CLOSED REDUCTION PERCUTANEOUS  PINNING, C- ARM, K-WIRES APPLICATION OF A CAST performed by Araceli Bustamante MD at [place] [address] Left 01/25/2019    CAST REMOVED FROM LEFT ARM .  LEFT DISTAL RADIUS CLOSED REDUCTION PINNING       Medications Prior to Admission:    Medications Prior to Admission: ketoconazole (NIZORAL) 2 % cream, Apply topically daily Indications: under breasts  lisinopril (PRINIVIL;ZESTRIL) 10 MG tablet, Take 10 mg by mouth daily  carbamide peroxide (DEBROX) 6.5 % otic solution, Place 5 drops into both ears as needed  ferrous sulfate (FE TABS 325) 325 (65 Fe) MG EC tablet, Take 325 mg by mouth daily (with breakfast)  ipratropium (ATROVENT) 0.03 % nasal spray, 1-2 sprays by Each Nostril route 4 times daily as needed for Rhinitis  Fluticasone-Umeclidin-Vilant (TRELEGY ELLIPTA) 200-62.5-25 MCG/INH AEPB, Inhale 1 puff into the lungs daily  apixaban (ELIQUIS) 5 MG TABS tablet, Take 1 tablet by mouth 2 times daily  acetaminophen-codeine (TYLENOL #3) 300-30 MG per tablet, Take 1-2 tablets by mouth every 4 hours as needed for Pain. albuterol (PROVENTIL) (2.5 MG/3ML) 0.083% nebulizer solution, Take 2.5 mg by nebulization every 6 hours as needed for Wheezing  pseudoephedrine-guaiFENesin (MUCINEX D)  MG per extended release tablet, Take 1 tablet by mouth 2 times daily as needed for Congestion   albuterol sulfate  (90 Base) MCG/ACT inhaler, Inhale 2 puffs into the lungs every 6 hours as needed for Wheezing  carvedilol (COREG) 6.25 MG tablet, Take 1 tablet by mouth 2 times daily  omeprazole (PRILOSEC) 20 MG delayed release capsule, Take 20 mg by mouth Daily   pravastatin (PRAVACHOL) 80 MG tablet, Take 80 mg by mouth daily   Calcium Carbonate-Vitamin D (CALCIUM + D PO), Take 1 tablet by mouth daily   vitamin D3 (CHOLECALCIFEROL) 25 MCG (1000 UT) TABS tablet, Take 1,000 Units by mouth daily   [DISCONTINUED] acetaminophen (TYLENOL) 325 MG tablet, Take 2 tablets by mouth every 4 hours as needed for Fever  [DISCONTINUED] ketoconazole (NIZORAL) 2 % cream,   [DISCONTINUED] spironolactone (ALDACTONE) 25 MG tablet,   [DISCONTINUED] fluticasone (FLONASE) 50 MCG/ACT nasal spray, 1 spray by Nasal route daily  [DISCONTINUED] docusate sodium (COLACE) 100 MG capsule, Take 100 mg by mouth as needed for Constipation    Allergies:    Ambien [zolpidem tartrate], Amoxicillin, Bee venom, and Metformin and related    Social History:    reports that she has never smoked. She has never used smokeless tobacco. She reports that she does not drink alcohol and does not use drugs. Family History:   family history includes Diabetes in her father.     REVIEW OF SYSTEMS:  Constitutional: negative, No fever no chills  Eyes: negative  Ears, nose, mouth, throat, and face: negative  Respiratory: negative, No cough no shortness of breath no Dyspnea  Cardiovascular: negative  Gastrointestinal: negative  Genitourinary:negative  Integument/breast: negative  Hematologic/lymphatic: negative  Musculoskeletal:negative  Neurological: negative, Dizziness no seizures no syncope  Behavioral/Psych: negative  Endocrine: negative, No polyuria no polydipsia no hypoglycemia  Allergic/Immunologic: negative  PHYSICAL EXAM:  General Appearance: alert and oriented to person, place and time and in no acute distress  Skin: warm and dry, no rash or erythema  Head: normocephalic and atraumatic  Eyes: pupils equal, round, and reactive to light and conjunctivae normal    Neck: neck supple and non tender without mass   Pulmonary/Chest: clear to auscultation bilaterally- no wheezes, rales or rhonchi, normal air movement, no respiratory distress  Cardiovascular: normal rate, regular rhythm, normal S1 and S2, no gallops, intact distal pulses and no carotid bruits  Abdomen: soft, non-tender, non-distended, normal bowel sounds, no masses or organomegaly  Extremities: no edema and good pulses no Homans    Neurologic: Alert oriented x3 with no focal    Vitals:  BP (!) 116/44   Pulse 60   Temp 98.3 °F (36.8 °C) (Axillary)   Resp 17   Ht 5' 2\" (1.575 m)   Wt 118 lb (53.5 kg)   SpO2 98%   BMI 21.58 kg/m²       LABS:  CBC:   Lab Results   Component Value Date    WBC 5.7 06/22/2021    RBC 3.18 06/22/2021    HGB 10.0 06/22/2021    HCT 31.0 06/22/2021    MCV 97.5 06/22/2021    MCH 31.4 06/22/2021    MCHC 32.3 06/22/2021    RDW 13.8 06/22/2021     06/22/2021    MPV 9.6 06/22/2021     BMP:    Lab Results   Component Value Date     06/22/2021    K 4.0 06/22/2021     06/22/2021    CO2 24 06/22/2021    BUN 15 06/22/2021    LABALBU 3.6 10/02/2017    CREATININE 0.63 06/22/2021    CALCIUM 8.7 06/22/2021    GFRAA >60 06/22/2021    LABGLOM >60 06/22/2021    GLUCOSE 83 06/22/2021    GLUCOSE 74 05/03/2012         ASSESSMENT:    Dizziness lightheadedness possible vertigo  Atherosclerotic heart disease  Chronic atrial fibrillation  Chronic diastolic CHF  PAD  Doubt UTI await cultures continue with antibiotics  Type 2 diabetes diet controlled  Patient Active Problem List   Diagnosis    Atrial fibrillation ([address]    Pacemaker    CAD (coronary artery disease)    Hyperlipidemia    Hx of CABG    H/O: CVA (cerebrovascular accident)    Varicose veins of left lower extremity    Mild mitral regurgitation    Bilateral leg edema    Normocytic normochromic anemia    Bilateral carotid artery stenosis    Ataxia    Lethargy    Fall at home    COPD (chronic obstructive pulmonary disease) ([address]    Esophageal reflux    HTN (hypertension), benign    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)    Acute CVA (cerebrovascular accident) ([address]    Diabetes mellitus type 2, diet-controlled ([address]    Bleeding    Syncope and collapse    Closed fracture of left wrist    UTI (urinary tract infection)    Severe malnutrition (HCC)       PLAN:    Meds labs reviewed continue present treatment IV fluids IV antibiotics till the cultures are back physical therapy occupational therapy may need rehab will have social service see the patient, orthostatic blood pressure she had history of CVA in the past she cannot have MRI due to the pacemaker check before meals and at bedtime Accu-Cheks with insulin coverage if needed            Guru Us MD MD  7:05 PM  6/22/2021

## 2021-06-22 NOTE — PROGRESS NOTES
Occupational Therapy   Occupational Therapy Initial Assessment  Date: 2021   Patient Name: James Jarrett  MRN: [id number]     : 1932    RN SHANNON reports patient is medically stable for therapy treatment this date. Chart reviewed prior to treatment and patient is agreeable for therapy. All lines intact and patient positioned comfortably at end of treatment. All patient needs addressed prior to ending therapy session. Pt currently functioning below baseline. Would suggest additional therapy at time of discharge to maximize long term outcomes and prevent re-admission. Please refer to AM-PAC score for current level of function. Date of Service: 2021    Discharge Recommendations:  Patient would benefit from continued therapy after discharge  OT Equipment Recommendations  Equipment Needed: Yes  Mobility Devices: ADL Assistive Devices  ADL Assistive Devices: Reacher;Sock-Aid Soft;Long-handled Shoe Horn;Long-handled Sponge    Assessment   Performance deficits / Impairments: Decreased functional mobility ; Decreased ADL status; Decreased safe awareness;Decreased balance;Decreased coordination;Decreased cognition;Decreased posture;Decreased endurance;Decreased high-level IADLs;Decreased strength;Decreased fine motor control  Assessment: Skilled OT is indicated to increase overall strength, balance and act melvin as well as I/safety in function to return home with assist as needed and at PLOF. Prognosis: Fair  Decision Making: High Complexity  OT Education: OT Role;Transfer Training;Orientation;Plan of Care;Energy Conservation; Family Education  Patient Education: safety in function, proper bed mob tech, call light use/fall prevention, pursed lip breathing, recommendations for continued therapy and benefits of being up OOB, postural control and weight shifting  Barriers to Learning: lethargy; memory and cognitive deficits  REQUIRES OT FOLLOW UP: Yes  Activity Tolerance  Activity Tolerance: Patient limited by fatigue;Patient Tolerated treatment well (limited by lethargy and balance issues)  Activity Tolerance: poor plus/fair minus  Safety Devices  Safety Devices in place: Yes  Type of devices: Call light within reach; Chair alarm in place; Left in chair;Patient at risk for falls;Gait belt;Nurse notified (BLE's elevated on stool/pillow under to increase pt's overall comfort; son present with pt upon exit)           Patient Diagnosis(es): The primary encounter diagnosis was Dizziness. A diagnosis of Acute cystitis without hematuria was also pertinent to this visit. has a past medical history of Atrial fibrillation ([address] CAD (coronary artery disease), CHF (congestive heart failure) ([address] COPD (chronic obstructive pulmonary disease) ([address] CVA (cerebral vascular accident) ([address] Esophageal reflux, Other and unspecified hyperlipidemia, Other primary cardiomyopathies, Type II or unspecified type diabetes mellitus without mention of complication, not stated as uncontrolled, Unspecified asthma(493.90), and Unspecified essential hypertension. has a past surgical history that includes Coronary artery bypass graft; pacemaker placement (2014); Hysterectomy; Wrist fracture surgery (Left, 01/25/2019); and Wrist Closed Reduction (Left, 1/25/2019). PER H&P:  HISTORY OF PRESENT ILLNESS   Patient is an 49-year-old female with history of atrial fibrillation, pacemaker, CAD, CABG, diabetes on Lasix Eliquis and multiple other medications here with dizziness. She states she experienced dizziness sensation for several minutes and called the ambulance. She states is resolved now. States she did have a little bit of blurry vision but that is resolved as well. Denies any trouble speech. Denies any new focal weakness numbness tingling of arms or legs. Denies any headache neck pain back pain chest pain abdominal pain. Denies any fever cough vomiting. She states she has had loose stool for quite some time.   She has had decreased appetite as well. Denies any recent fall. Denies any urinary symptoms       Restrictions  Restrictions/Precautions  Restrictions/Precautions: Fall Risk, General Precautions  Implants present? : Pacemaker  Position Activity Restriction  Other position/activity restrictions: up with assist, orthostatic BP & pulse checks, ALARMS, Heels off bed at all times, turn/assist Q 2 hrs if unable to turn self, LUE IV    Subjective   General  Chart Reviewed: Yes  Patient assessed for rehabilitation services?: Yes  Family / Caregiver Present: Yes (son)  Patient Currently in Pain: Denies    Social/Functional History  Social/Functional History  Lives With: Alone  Type of Home: Apartment  Home Layout: One level (laundry in apt)  Home Access: Level entry  Bathroom Shower/Tub: Tub/Shower unit  Bathroom Toilet: Handicap height  Bathroom Equipment: Grab bars in shower, Shower chair  Home Equipment: Rolling walker, 4 wheeled walker, Alert [employer] Help From: Family (pt states has a supportive family)  ADL Assistance: [address] [place] [address] Avenue: Independent  Homemaking Responsibilities: Yes  Ambulation Assistance: Independent (uses R/W)  Transfer Assistance: Independent  Active : No  Mode of Transportation: Family  Occupation: Retired  Type of occupation: [employer]  Leisure & Hobbies: TV, reading, games  Additional Comments: Pt reports dizziness but denies falls. *Pt gave limited information due to lethargy and son present & provided more details.        Objective   Vision: Impaired  Vision Exceptions: Wears glasses at all times (pt denies vision changes)  Hearing: Exceptions to [facility]  Hearing Exceptions: Bilateral hearing aid;Hard of hearing/hearing concerns (son states aids are at home)    Orientation  Overall Orientation Status: Impaired  Orientation Level: Oriented to place;Oriented to time;Oriented to situation;Oriented to person (oriented to month with verbal cues and 2 choices given; pt oriented to stand at grab bar with RW for anterior hygiene)  Additional Comments: *Pt is DEP at this time when up with RW for safety/balance support due to increased posterior lean. Tone RUE  RUE Tone: Normotonic  Tone LUE  LUE Tone: Normotonic  Coordination  Movements Are Fluid And Coordinated: No  Coordination and Movement description: Fine motor impairments (B arthritic changes noted)     Bed mobility  Supine to Sit: Moderate assistance;2 Person assistance  Sit to Supine: Unable to assess (pt agreed to sit up in chair after edu on benefits of being up OOB as able)  Comment: MOD verbal instruction/tactile assist for hand placement on bed rail to assist and proper log rolling tech and pursed lip breathing. Transfers  Stand Step Transfers: Moderate assistance;2 Person assistance (with RW)  Sit to stand: Moderate assistance;2 Person assistance  Stand to sit: Moderate assistance;2 Person assistance  Transfer Comments: MAX-MOD verbal instruction/tactile assist for hand placement pushing from surface seated on vs AD and reaching back to surface with stand to sits and controlled, squaring self/AD up to surface prior to sitting, nose over toes as able, upright posture and weight shifitng, scanning, pacing and awareness/assist with lines to increase overall safety. Cognition  Overall Cognitive Status: Exceptions  Arousal/Alertness: Delayed responses to stimuli  Following Commands: Follows one step commands with increased time; Follows one step commands with repetition  Attention Span: Attends with cues to redirect; Difficulty attending to directions  Memory: Decreased short term memory  Safety Judgement: Decreased awareness of need for assistance;Decreased awareness of need for safety  Problem Solving: Assistance required to implement solutions;Assistance required to correct errors made;Assistance required to generate solutions;Assistance required to identify errors made;Decreased awareness of errors  Insights: Decreased awareness of deficits  Initiation: Requires cues for all  Sequencing: Requires cues for some  Cognition Comment: *continuue to further assess as needed  Perception  Overall Perceptual Status: WFL     Sensation  Overall Sensation Status: WFL        LUE AROM (degrees)  LUE AROM : WFL  RUE AROM (degrees)  RUE AROM : WFL  LUE Strength  Gross LUE Strength: WFL  LUE Strength Comment: BUE strength grossly 4/5  RUE Strength  Gross RUE Strength: WFL                   Plan   Plan  Times per week: 4-5x/week 1x/day as melvin  Current Treatment Recommendations: Strengthening, Endurance Training, Neuromuscular Re-education, Patient/Caregiver Education & Training, Self-Care / ADL, Equipment Evaluation, Education, & procurement, Cognitive Reorientation, Cognitive/Perceptual Training, Home Management Training, Balance Training, Functional Mobility Training, Safety Education & Training, Positioning                                                  AM-PAC Score   13    *Co-treatment with PT warranted secondary to decreased safety and independence requiring 2 skilled therapy professionals to address individual discipline's goals. OT addressing preparation for ADL transfer, sitting and standing balance for increased ADL performance, sitting/activity tolerance, functional reaching, environmental safety/scanning, fall prevention, functional mobility for ADL transfers, proper bed mob tech, ability to sequence and follow directions and functional UE strength. Goals  Short term goals  Time Frame for Short term goals: by discharge, pt to demo  Short term goal 1: bed mob tasks with use of rail as needed to mod assist of 1. Short term goal 2: increase BUE strength by a 1/2 grade to assist with care and complete simple BUE HEP with SUP and use of handouts as needed. Short term goal 3: UB ADL to set up and LB ADL to mod assist of 1 and use of AD/AE as needed.   Short term goal 4: toileting tasks with use of grab bar/AD as needed to mod assist of 1.  Short term goal 5: ADL transfers and functional mob with AD as needed and min assist of 1. Long term goals  Long term goal 1: Pt to stand with CG and AD melvin > 5 mins as able to reduce falls with ADL tasks. Long term goal 2: Caregivers to demo I with BUE HEP, pressure relief, EC/WS and fall prevention tech, DME/AE recommendations with use of handouts. Patient Goals   Patient goals : Pt states she wants to go home!        Therapy Time   Individual Concurrent Group Co-treatment   Time In 0826 (plus 10 min chart review/RN communication)         Time Out 0914         Minutes 48         Timed Code Treatment Minutes: [address] [place], [place]

## 2021-06-22 NOTE — PROGRESS NOTES
Comprehensive Nutrition Assessment    Type and Reason for Visit:  Initial, Positive Nutrition Screen (34 or more weight loss, decreased appetite; malnutrition score:5)    Nutrition Recommendations/Plan:   1. Continue with current Regular diet- monitor for Carb Control restriction  2. Add Glucerna 2x/d   3. Monitor p.o intakes, supplement acceptance, weights and labs. Nutrition Assessment:  Patient admitted for urinary tract infection. Patient seen today and reports feeling better than she did. Reports weighing 172# two years ago (when she got dentures) and has been loosing weight ever since. 54# (31.4%) wt loss x 24 months noted. Patient does have severre subcutaneous fat loss and muscle mass loss. She reports having two meals a day and drinking two Pure Protein shakes per day. She reports decreased appetite x \"years\". States after not eating for awhile she lost her hunger cues and now she never feels hungry. Patient meets criteria for severe malnutrition. Patient ate 966% breakfast (1 slice of Western Mel [place] and 4oz OJ). She is agreeable to Glucerna 2x/d. Will monitor p.o intakes, supplement acceptance, weights and labs. Monitor blood sugars and need for Carb Control restriction as pt with hx: T2DM. Malnutrition Assessment:  Malnutrition Status:  Severe malnutrition    Context:  Chronic Illness     Findings of the 6 clinical characteristics of malnutrition:  Energy Intake:  7 - 75% or less estimated energy requirements for 1 month or longer  Weight Loss:  1 - Mild weight loss (specify amount and time period) (31.4% weight loss over two years)     Body Fat Loss:  7 - Severe body fat loss Triceps   Muscle Mass Loss:  7 - Severe muscle mass loss Clavicles (pectoralis & deltoids), Hand (interosseous)  Fluid Accumulation:  No significant fluid accumulation     Strength:  Not Performed    Estimated Daily Nutrient Needs:  Energy (kcal):  1370 kcal (1.5);  Weight Used for Energy Requirements:  Current Protein (g):  81-86 gm (1.5-1.6 g/kg); Weight Used for Protein Requirements:  Current        Method Used for Fluid Requirements:  1 ml/kcal      Nutrition Related Findings:  GI: WDL. Edema: BLE +1 non-pitting. Labs reviewed. Wounds:  None       Current Nutrition Therapies:    Adult Oral Nutrition Supplement; Diabetic Oral Supplement  ADULT DIET;  Regular; 4 carb choices (60 gm/meal)    Anthropometric Measures:  · Height: 5' 2\" (157.5 cm)  · Current Body Weight: 118 lb (53.5 kg)   · Admission Body Weight: 118 lb (53.5 kg)    · Usual Body Weight: 172 lb (78 kg) (2 years ago)     · Ideal Body Weight: 110 lbs; % Ideal Body Weight 107.3 %   · BMI: 21.6  · Adjusted Body Weight:  ; No Adjustment   · BMI Categories: Underweight (BMI less than 22) age over 72       Nutrition Diagnosis:   · Severe malnutrition related to inadequate protein-energy intake as evidenced by poor intake prior to admission, weight loss, severe loss of subcutaneous fat, severe muscle loss, other (comment) (31.4% weight loss over two years)    Nutrition Interventions:   Food and/or Nutrient Delivery:  Continue Current Diet, Start Oral Nutrition Supplement  Nutrition Education/Counseling:  Education not indicated   Coordination of Nutrition Care:  Continue to monitor while inpatient    Goals:  PO intakes to provide >75% of estimated nutrition needs       Nutrition Monitoring and Evaluation:   Behavioral-Environmental Outcomes:  None Identified   Food/Nutrient Intake Outcomes:  Food and Nutrient Intake, Supplement Intake  Physical Signs/Symptoms Outcomes:  Biochemical Data, Fluid Status or Edema, Weight     Discharge Planning:    Continue Oral Nutrition Supplement, Continue current diet     Jeffry Ryan, [address] [place] [address]  Office Number: [phone]

## 2021-06-22 NOTE — PROGRESS NOTES
Spoke with tech from [employer] who will be out later today to interrogate patient's pacemaker. Also Dr. Colette Johnson does not come to [facility] and consult has been changed to Dr. Tran Cardona whom has also been consulted and Abdirahman Gaspar will be seeing patient.

## 2021-06-22 NOTE — PROGRESS NOTES
Pt admitted to room 2001 per stretcher in stable condition from ED  Oriented to room and surroundings  Bed in lowest position, wheels locked, 2/4 side rails up  Call light in reach, room free of clutter, adequate lighting provided  Denies any further questions at this time  Instructed to call out with any questions/concerns/new onset of pain and/or n/v   White board updated  Continue to monitor with hourly rounding  STAY WITH ME protocol initiated   Bed alarm on/Fall Risk signs in place/Fall risk sticker to wrist band  Non-skid socks on/at bedside  [address] in place for patient/family to view & ask questions.

## 2021-06-22 NOTE — RT PROTOCOL NOTE
RT Inhaler-Nebulizer Bronchodilator Protocol Note    There is a bronchodilator order in the chart from a provider indicating to follow the RT Bronchodilator Protocol and there is an Initiate RT Bronchodilator Protocol order as well (see protocol at bottom of note). The findings from the last RT Protocol Assessment were as follows:  Smoking: Non smoker  Surgical Status: No surgery (recent oral surgery)  Xray: Chronic changes  Respiratory Pattern: RR 12-20  Mental Status: Alert and Oriented  Breath Sounds: Clear  Cough: Strong, spontaneous, non-productive  Activity Level: Walking with assistance  Oxygen Requirement: Room Air - 2LNC/28% or home setting  Indication for Bronchodilator Therapy: On home bronchodilators  Bronchodilator Assessment Score: 1    Aerosolized bronchodilator medication orders have been revised according to the RT Bronchodilator Protocol. RT Inhaler-Nebulizer Bronchodilator Protocol:    Respiratory Therapist to perform RT Therapy Protocol Assessment then follow the protocol. No Indications - adjust the frequency to every 6 hours PRN wheezing or bronchospasm, if no treatments needed after 48 hours then discontinue using Per Protocol order mode. If indication present, adjust the RT bronchodilator orders based on the Bronchodilator Assessment Score as follows:    0-6 - enter or revise RT bronchodilator order to Albuterol Inhaler order with frequency of every 2 hours PRN for wheezing or increased work of breathing using Per Protocol order mode. If Albuterol Inhaler not tolerated or not effective, then discontinue the Albuterol Inhaler order and enter Albuterol Nebulizer order with same frequency and PRN reasons. Repeat RT Therapy Protocol Assessment as needed.     7-10 - discontinue any other Inpatient aerosolized bronchodilator medication orders and enter or revise two Albuterol Inhaler orders, one with BID frequency and one with frequency of every 2 hours PRN wheezing or increased

## 2021-06-22 NOTE — PROGRESS NOTES
Physical Therapy    Facility/Department: [facility]  Initial Assessment    NAME: Cristobal Carpio  : 1932  MRN: [id number]    Date of Service: 2021    Discharge Recommendations:  Patient would benefit from continued therapy after discharge     Pt presented to ED on 21 with dizziness. She states she experienced dizziness sensation for several minutes and called the ambulance. She states is resolved now. States she did have a little bit of blurry vision but that is resolved as well. Denies any trouble speech. Denies any new focal weakness numbness tingling of arms or legs. Denies any headache neck pain back pain chest pain abdominal pain. Denies any fever cough vomiting. She states she has had loose stool for quite some time. She has had decreased appetite as well. Denies any recent fall. RN reports patient is medically stable for therapy treatment this date. Chart reviewed prior to treatment and patient is agreeable for therapy. Assessment   Body structures, Functions, Activity limitations: Decreased functional mobility ; Decreased strength;Decreased endurance;Decreased ADL status; Decreased safe awareness;Decreased balance  Assessment: Pt with deficits of bed mobility, transfers, ambulation, balance, cognition, safety awareness and endurance this session,  & required 2 assist for safe functional mobility, transfers & gait. , & is decline compared to prior level of function. With current deficits, Pt HIGH risk for falls & requires continued PT to maximize independence with functional mobility, balance, safety awareness & activity tolerance. Pt currently functioning below baseline. Would suggest additional therapy at time of discharge to maximize long term outcomes and prevent re-admission. Please refer to AM-PAC score for current level of function.   Prognosis: Good  Decision Making: High Complexity  Exam: ROM, MMT, functional mobility, activity tolerance, Balance, & [facility] 6 Clicks Basic Mobility  Clinical Presentation: evolving  PT Education: Goals;PT Role;Plan of Care;Precautions;Transfer Training;Functional Mobility Training;Gait Training;General Safety  Patient Education: Ed pt on functional mobility, safety awareness, importance of being up & OOB to regain strength, & prevention of sedentary complications, circulation ex's,  & optimal breathing techniques  REQUIRES PT FOLLOW UP: Yes  Activity Tolerance  Activity Tolerance: Patient limited by fatigue;Patient limited by endurance       Patient Diagnosis(es): The primary encounter diagnosis was Dizziness. A diagnosis of Acute cystitis without hematuria was also pertinent to this visit. has a past medical history of Atrial fibrillation ([address] CAD (coronary artery disease), CHF (congestive heart failure) ([address] COPD (chronic obstructive pulmonary disease) ([address] CVA (cerebral vascular accident) ([address] Esophageal reflux, Other and unspecified hyperlipidemia, Other primary cardiomyopathies, Type II or unspecified type diabetes mellitus without mention of complication, not stated as uncontrolled, Unspecified asthma(493.90), and Unspecified essential hypertension. has a past surgical history that includes Coronary artery bypass graft; pacemaker placement (2014); Hysterectomy; Wrist fracture surgery (Left, 01/25/2019); and Wrist Closed Reduction (Left, 1/25/2019).     Restrictions  Restrictions/Precautions  Restrictions/Precautions: Fall Risk, General Precautions  Implants present? : Pacemaker  Position Activity Restriction  Other position/activity restrictions: up with assist, orthostatic BP & pulse checks, ALARMS, Heels off bed at all times, turn/assist Q 2 hrs if unable to turn self, LUE IV  Vision/Hearing  Vision: Impaired  Vision Exceptions: Wears glasses at all times (pt denies vision changes)  Hearing: Exceptions to [facility]  Hearing Exceptions: Bilateral hearing aid;Hard of hearing/hearing concerns (aids are at home)     Subjective  General  Chart Reviewed: Yes  Patient assessed for rehabilitation services?: Yes  Additional Pertinent Hx: DM,atrial fibrillation, pacemaker, CAD, CABG  Response To Previous Treatment: Not applicable  General Comment  Comments: RN okays PT  Subjective  Subjective: Pt agreeable to PT  Pain Screening  Patient Currently in Pain: Denies  Vital Signs  Orthostatic B/P and Pulse?: Yes  Blood Pressure Lyin/60 (MAP 83)  Pulse Lyin PER MINUTE  Blood Pressure Sittin/66 (MAP 86)  Pulse Sittin PER MINUTE  Blood Pressure Standin/67 (MAP 89)  Pulse Standin PER MINUTE  Patient Currently in Pain: Denies  Orthostatic B/P and Pulse?: Yes       Orientation  Orientation  Overall Orientation Status: Within Functional Limits  Social/Functional History  Social/Functional History  Lives With: Alone  Type of Home: Apartment  Home Layout: One level (laundry in apt)  Home Access: Level entry  Bathroom Shower/Tub: Tub/Shower unit  Bathroom Toilet: Handicap height  Bathroom Equipment: Grab bars in shower, Shower chair  Home Equipment: Rolling walker, 4 wheeled walker, Alert [employer] Help From: Family (pt has a supportive family)  ADL Assistance: Independent  Homemaking Assistance: Independent  Homemaking Responsibilities: Yes  Ambulation Assistance: Independent (uses R/W)  Transfer Assistance: Independent  Active : No  Mode of Transportation: Family  Occupation: Retired  Type of occupation:  TPS  Leisure & Hobbies: TV, reading, games  Additional Comments: Pt reports dizziness but denies falls. Pt gave limited information due to lethargy and son present & provided more details. Cognition   Cognition  Overall Cognitive Status: Exceptions  Arousal/Alertness: Delayed responses to stimuli  Following Commands: Follows one step commands with increased time; Follows one step commands with repetition  Attention Span: Attends with cues to redirect; Difficulty attending to with increased time needed all to increase safety & reduce fall risk  Transfers  Sit to Stand: Moderate Assistance;2 Person Assistance  Stand to sit: Moderate Assistance;2 Person Assistance  Bed to Chair: Moderate assistance;2 Person Assistance  Stand Pivot Transfers: Moderate Assistance;2 Person Assistance  Lateral Transfers: Moderate Assistance;2 Person Assistance  Comment: Ed + tactile assist on correct use of upper body for safe sit/stand, pt is very wobbly with posterior lean while standing  Ambulation  Ambulation?: Yes  Ambulation 1  Surface: level tile  Device: Rolling Walker  Assistance: Moderate assistance;2 Person assistance  Quality of Gait: step to pattern, very wobbly & unsteady & cues needed to keep walker close at all times & to amb inside base of walker  Gait Deviations: Decreased step length;Decreased step height  Distance: 20ft     Balance  Sitting - Static: Good  Sitting - Dynamic: Good;-  Standing - Static: Fair  Standing - Dynamic: Fair;- (R/W)  Comments: very wobbly upon standing & posterior LOB even with support  Exercises  Comments: Ed pt on functional mobility, safety awareness, importance of being up & OOB to regain strength, & prevention of sedentary complications, circulation ex's,  & optimal breathing techniques   Pt completed static seated & standing weight shifts & reaches, ant/post & lateral WS seated to improve core strength & stability   All lines intact, call light within reach, and patient positioned comfortably at end of treatment. All patient needs addressed prior to ending therapy session.           Plan   Plan  Times per week: 1-2x/D, 5-6d/week  Current Treatment Recommendations: Strengthening, Balance Training, Functional Mobility Training, Transfer Training, Endurance Training, Gait Training, Home Exercise Program, Safety Education & Training, Patient/Caregiver Education & Training  Safety Devices  Type of devices: Bed alarm in place, Call light within reach, Chair alarm in place, Gait belt, Patient at risk for falls, Left in chair    G-Code       OutComes Score                                                  AM-PAC Score  AM-PAC Inpatient Mobility Raw Score : 12 (06/22/21 0906)  AM-PAC Inpatient T-Scale Score : 35.33 (06/22/21 0906)  Mobility Inpatient CMS 0-100% Score: 68.66 (06/22/21 0906)  Mobility Inpatient CMS G-Code Modifier : CL (06/22/21 9995)          Goals  Short term goals  Time Frame for Short term goals: 12 visits  Short term goal 1: Inc bed-mobility & transfers to independent to enable pt to safely get in/OOB & chair  Short term goal 2: Inc gait to amb 250ft or > indep w/ RW to enable pt to return to previous level of independence  Short term goal 3: Inc strength to [address] standing balance to good with device to facilitate pt independence for performance of ADL's & functional mobility, & reduce fall risk  Short term goal 4: Pt able to tolerate 30-40 min of activity to include 15-20 reps of ex, NMR & functional mobility including 5 minutes of standing with device to facilitate activity tolerance to [facility]  Short term goal 5: Ed pt on home ex's, safety & energy principles, fall prevention, & issue written home program       Therapy Time   Individual Concurrent Group Co-treatment   Time In 0827         Time Out 0913         Minutes 46+10=56               Additional 10 minutes for chart review    Treatment time: 43 minutes  Co-treatment with OT warranted secondary to decreased safety and independence requiring 2 skilled therapy professionals to address individual discipline's goals. PT addressing pre gait trunk strengthening, weight shifting prior to transfers, transfer training and postural control in sitting.     [address], PT

## 2021-06-22 NOTE — PLAN OF CARE
Nutrition Problem #1: Severe malnutrition  Intervention: Food and/or Nutrient Delivery: Continue Current Diet, Start Oral Nutrition Supplement  Nutritional Goals: PO intakes to provide >75% of estimated nutrition needs

## 2021-06-22 NOTE — CONSULTS
[address]  [address] [place] [address], [address]  [phone]               Cardiology Consult           Date of Admission:  6/21/2021  Date of Consultation:  6/22/2021      PCP:  Solis Sullivan MD      Chief Complaint: Acute episode of dizziness and vertigo    History of Present Illness:  James Jarrett is a 80 y.o. female who presents with an acute episode of dizziness and vertigo. Patient states that she always has a certain level of dizziness and imbalance but yesterday it was much worse. She states that she got up to move around and felt like the whole room was spinning and she almost fell. She had to steady herself. She did not feel nauseated or did not vomit. She had not been running any fevers and denied any cough or chest pain. She does have a chronic level of shortness of breath which has not worsened. She pressed her life alert button and the rescue squad brought her to the emergency room for evaluation. She has subsequently been diagnosed with a urinary tract infection and has received some IV antibiotics. Today she feels much better and feels much less steady. She has been up and moving around. Patient has multiple cardiac diagnosis including underlying coronary artery disease and is status post coronary artery bypass grafting and prior stenting. She also has a history of peripheral vascular disease. She is status post permanent pacemaker placement. Her most recent pacemaker check was normal however her battery is starting to show some signs of end-of-life and may need replacement sometime in the next year. Otherwise the pacemaker function was completely normal.  Her underlying rhythm is chronic atrial fibrillation and she is on chronic oral anticoagulation with Eliquis for this problem.   She also has a history of chronic diastolic congestive heart failure, type 2 diabetes mellitus, essential hypertension, hyperlipidemia and prior history of stroke. PMH:   has a past medical history of Atrial fibrillation ([facility] CAD (coronary artery disease), CHF (congestive heart failure) ([facility] COPD (chronic obstructive pulmonary disease) ([facility] CVA (cerebral vascular accident) ([facility] Esophageal reflux, Other and unspecified hyperlipidemia, Other primary cardiomyopathies, Type II or unspecified type diabetes mellitus without mention of complication, not stated as uncontrolled, Unspecified asthma(493.90), and Unspecified essential hypertension. PSH:   has a past surgical history that includes Coronary artery bypass graft; pacemaker placement (2014); Hysterectomy; Wrist fracture surgery (Left, 01/25/2019); and Wrist Closed Reduction (Left, 1/25/2019). Allergies: Allergies   Allergen Reactions    Ambien [Zolpidem Tartrate]     Amoxicillin     Bee Venom Diarrhea    Metformin And Related         Home Meds:    Prior to Admission medications    Medication Sig Start Date End Date Taking?  Authorizing Provider   apixaban (ELIQUIS) 5 MG TABS tablet Take 1 tablet by mouth 2 times daily 9/23/20   Tatiana Felder MD   furosemide (LASIX) 20 MG tablet Take 1 tablet by mouth daily as needed (swelling) 6/8/20   Tatiana Felder MD   acetaminophen-codeine (TYLENOL #3) 300-30 MG per tablet  1/13/19   Historical Provider, MD   albuterol (PROVENTIL) (2.5 MG/3ML) 0.083% nebulizer solution Take 2.5 mg by nebulization every 6 hours as needed for Wheezing    Historical Provider, MD   pseudoephedrine-guaiFENesin ([address])  MG per extended release tablet Take 1 tablet by mouth every 12 hours    Historical Provider, MD   albuterol sulfate  (90 Base) MCG/ACT inhaler Inhale 2 puffs into the lungs every 6 hours as needed for Wheezing    Historical Provider, MD   carvedilol (COREG) 6.25 MG tablet Take 1 tablet by mouth 2 times daily 2/7/18   DILCIA Fuentes - CNP   acetaminophen (TYLENOL) 325 MG tablet Take 2 tablets by mouth every 4 hours as needed for Fever 10/5/17   Scotty Guerra DO   mometasone-formoterol (DULERA) 200-5 MCG/ACT inhaler Inhale 2 puffs into the lungs every 12 hours    Historical Provider, MD   budesonide-formoterol (SYMBICORT) 160-4.5 MCG/ACT AERO Inhale 2 puffs into the lungs 2 times daily    Historical Provider, MD   omeprazole (PRILOSEC) 20 MG delayed release capsule  8/23/16   Historical Provider, MD   pravastatin (PRAVACHOL) 80 MG tablet  8/23/16   Historical Provider, MD   spironolactone (ALDACTONE) 25 MG tablet  8/23/16   Historical Provider, MD   fluticasone (FLONASE) 50 MCG/ACT nasal spray 1 spray by Nasal route daily    Historical Provider, MD   Calcium Carbonate-Vitamin D (CALCIUM + D PO) Take by mouth    Historical Provider, MD   Cholecalciferol (VITAMIN D3) 2000 UNITS CAPS Take by mouth    Historical Provider, MD        Hospital Meds:    Current Facility-Administered Medications   Medication Dose Route Frequency Provider Last Rate Last Admin    glucose (GLUTOSE) 40 % oral gel 15 g  15 g Oral PRN Matilda Vegas MD        dextrose 50 % IV solution  12.5 g Intravenous PRN Matilda Vegas MD        glucagon (rDNA) injection 1 mg  1 mg Intramuscular PRN Matilda Vegas MD        dextrose 5 % solution  100 mL/hr Intravenous PRN Matilda Vegas MD        insulin lispro (HUMALOG) injection vial 0-6 Units  0-6 Units Subcutaneous TID WC Matilda Vegas MD   1 Units at 06/22/21 1146    insulin lispro (HUMALOG) injection vial 0-3 Units  0-3 Units Subcutaneous Nightly Matilda Vegas MD        cefTRIAXone (ROCEPHIN) 1000 mg IVPB in 50 mL D5W minibag  1,000 mg Intravenous Q24H Matilda Vegas MD        0.9 % sodium chloride infusion   Intravenous Continuous Andrewspop Murray  mL/hr at 06/22/21 0512 New Bag at 06/22/21 0512    sodium chloride flush 0.9 % injection 5-40 mL  5-40 mL Intravenous 2 times per day Matilda Vegas MD        sodium chloride flush 0.9 % injection 5-40 mL  5-40 mL Intravenous PRN Matilda Vegas MD  0.9 % sodium chloride infusion  25 mL Intravenous PRN Minor Bhatt MD        acetaminophen (TYLENOL) tablet 650 mg  650 mg Oral Q4H PRN Minor Bhatt MD   650 mg at 06/22/21 0307    ondansetron (ZOFRAN-ODT) disintegrating tablet 4 mg  4 mg Oral Q8H PRN Minor Bhatt MD        Or    ondansetron (ZOFRAN) injection 4 mg  4 mg Intravenous Q6H PRN Minor Bhatt MD        albuterol sulfate  (90 Base) MCG/ACT inhaler 2 puff  2 puff Inhalation Q6H PRN Minor Bhatt MD        budesonide-formoterol (SYMBICORT) 160-4.5 MCG/ACT inhaler 2 puff  2 puff Inhalation BID Minor Bhatt MD   2 puff at 06/22/21 0741    carvedilol (COREG) tablet 6.25 mg  6.25 mg Oral BID Minor Bhatt MD   6.25 mg at 06/22/21 4613    furosemide (LASIX) tablet 20 mg  20 mg Oral Daily PRN Minor Bhatt MD        pantoprazole (PROTONIX) tablet 40 mg  40 mg Oral QAM AC Minor Bhatt MD   40 mg at 06/22/21 7657    pravastatin (PRAVACHOL) tablet 40 mg  40 mg Oral Nightly Minor Bhatt MD   40 mg at 06/21/21 2350    melatonin tablet 3 mg  3 mg Oral Nightly PRN Minor Bhatt MD   3 mg at 06/21/21 2350    hydrALAZINE (APRESOLINE) injection 5 mg  5 mg Intravenous Q6H PRN Minor Bhatt MD        apixaban (ELIQUIS) tablet 2.5 mg  2.5 mg Oral BID Minor Bhatt MD   2.5 mg at 06/22/21 0670       Social History:       TOBACCO:   reports that she has never smoked. She has never used smokeless tobacco.  ETOH:   reports no history of alcohol use. DRUGS:  reports no history of drug use. OCCUPATION:          Family Histroy:         Problem Relation Age of Onset    Diabetes Father            Review of Systems:   Constitutional: there has been no unanticipated weight loss. There's been no change in energy level, sleep pattern, or activity level. Eyes: No visual changes or diplopia. No scleral icterus. ENT: No Headaches, hearing loss or vertigo. No mouth sores or sore throat.   Cardiovascular: See HPI  Respiratory: No cough or wheezing, no sputum production. No hemoptysis. Gastrointestinal: No abdominal pain, appetite loss, blood in stools. No change in bowel or bladder habits. Genitourinary: No dysuria, trouble voiding, or hematuria. Musculoskeletal:  No gait disturbance, weakness or joint complaints. Integumentary: No rash or pruritis. Neurological: No headache, diplopia, change in muscle strength, numbness or tingling. No change in gait, balance, coordination, mood, affect, memory, mentation, behavior. Psychiatric: No anxiety, or depression. Endocrine: No temperature intolerance. No excessive thirst, fluid intake, or urination. No tremor. Hematologic/Lymphatic: No abnormal bruising or bleeding, blood clots or swollen lymph nodes. Allergic/Immunologic: No nasal congestion or hives. Physical Exam    Vital Signs: /62   Pulse 64   Temp 97.3 °F (36.3 °C) (Oral)   Resp 17   Ht 5' 2\" (1.575 m)   Wt 118 lb (53.5 kg)   SpO2 97%   BMI 21.58 kg/m²        Admission Weight: 118 lb (53.5 kg)     General appearance: Awake, Alert Cooperative    Head: Normocephalic, without obvious abnormality, atraumatic    Eyes: Extraocular movements intact    Neck: no adenopathy, no carotid bruit, no JVD, supple, symmetrical,  thyroid: not enlarged    Lungs: clear to auscultation bilaterally anteriorly and posteriorly. Respiratory effort is normal.  No wheezes rales or rhonchi    Heart: Regular S1-S2 with no audible murmur    Abdomen: Soft, non-tender.      Extremities: extremities normal, atraumatic, no cyanosis or edema    Skin: Skin color, texture, turgor normal. No rashes or lesions    Neurologic: Grossly normal            Labs:      CBC:   Recent Labs     06/21/21 1824 06/22/21  0450   WBC 4.3 5.7   HGB 10.7* 10.0*   HCT 33.9* 31.0*   MCV 98.8 97.5    149     BMP:   Recent Labs     06/21/21 1824 06/22/21  0450   * 136   K 4.6 4.0   CL 94* 103   CO2 26 24   BUN 17 15   CREATININE 0.78 0.63     PT/INR: No results for input(s): PROTIME, INR in the last 72 hours. APTT: No results for input(s): APTT in the last 72 hours. MAG: No results for input(s): MG in the last 72 hours. D Dimer: No results for input(s): DDIMER in the last 72 hours. Troponin T   Recent Labs     06/21/21  1824 06/22/21  0223 06/22/21  0450   TROPHS 9 10 11     ProBNP Invalid input(s): PRO-BNP    CT HEAD WO CONTRAST    Result Date: 6/21/2021  No acute intracranial abnormality. XR CHEST 1 VIEW    Result Date: 6/21/2021  Slight increase in interstitial markings in the right lung base compared to the prior study. Diagnosis:  Active Problems:    UTI (urinary tract infection)    Severe malnutrition (HCC)  Resolved Problems:    * No resolved hospital problems. *    #1 dizziness and lightheadedness. Probably multifactorial given underlying advanced age, prior stroke, underlying infection. Patient was also likely slightly dehydrated. She has been rehydrated here in the hospital and her overall symptoms appear to be improved. It is possible that this also could have been an acute attack of vertigo. Nonetheless she seems to be better at this time. Her pacemaker was checked approximately 2 weeks ago and is found to be functioning normally. The only issue with the pacemaker is that it is nearing end-of-life parameters and will likely need to be replaced sometime in the next year. 2.  Atherosclerosis of coronary arteries of native heart without angina pectoris. Patient is stable on present medication. 3.  Chronic atrial fibrillation. Patient was taking Eliquis 5 mg twice daily at home. Given her age and her weight the proper dosing would be for her to be on 2.5 mg daily. We will make this adjustment if not already done. 4.  Chronic diastolic congestive heart failure. She seems to be fairly well compensated at this time. 5.  Peripheral vascular disease.   Patient not experiencing any claudication symptoms at this time.  6.  Urinary tract infection being treated with antibiotics at this time. Plan:    From a cardiac standpoint the only change we would make at this time would be to decrease her Eliquis dosing down to 2.5 mg twice daily. Otherwise we feel that would be okay for the patient to be discharged when stable medically. No further recommendations necessary at this time. We will sign off. Please reconsult if problems should arise.       Electronically signed by Lisseth Garrett MD on 6/22/2021 at 12:45 PM

## 2021-06-22 NOTE — CARE COORDINATION
Social Work-Met with patient. Discussed that she required 2 assist with mobility. Discussed benefit of short term rehab. She stated that she will need to call her doctor to discuss with him. She would like to go home with [facility]. Will meet with patient in the morning to finalize dc plans.  Nile Evans

## 2021-06-22 NOTE — PLAN OF CARE
Problem: Skin Integrity:  Goal: Absence of new skin breakdown  Description: Absence of new skin breakdown  Outcome: Ongoing  Note: Skin assessment completed and documented  Scotty scale updated  Relieve pressure to bony prominences  Avoid shearing  Assess s/sx of infection   Air mattress in place  Turns side to side  Turns self  Michelle care given and barrier cream applied to prevent breakdown  Heels elevated  Structural intactness and normal physiological function of skin and mucous membranes      Problem: Falls - Risk of:  Goal: Will remain free from falls  Description: Will remain free from falls  Outcome: Ongoing  Note: Room free of clutter  Hourly rounding   Non-skid socks worn  Side rails up x2  Bed low and locked  Call light in reach  Instructed to call out before getting out of bed  Anticipatory needs met  Bed alarm on  Falling star at the door      Problem: Discharge Planning:  Goal: Discharged to appropriate level of care  Description: Discharged to appropriate level of care  Outcome: Ongoing  Note: Identify potential discharge needs/barriers on admission  Involve family/patient/significant other in discharge process  Collaborate with Case Management/ for discharge needs/concerns

## 2021-06-22 NOTE — CARE COORDINATION
Case Management Initial Discharge Plan  [facility],         Readmission Risk              Risk of Unplanned Readmission:  14             Met with:patient to discuss discharge plans. Information verified: address, contacts, phone number, , insurance Yes  PCP: Mee Babinski, MD  Date of last visit: few months ago    Insurance Provider: [employer]    Discharge Planning  Current Residence:     Living Arrangements:  [place] has one stories/one stairs to climb  Support Systems:  Family Members, Friends/Neighbors  Current Services PTA:    Supplier: none  Patient able to perform ADL's:Independent  DME used to aid ambulation prior to admission: walker/cane/during admissionwalker    Potential Assistance Needed:  Outpatient PT/OT    Pharmacy: [address] [address]   Potential Assistance Purchasing Medications:  No  Does patient want to participate in local refill/ meds to beds program?  No    Patient agreeable to home care: Yes  Freedom of choice provided:  yes      Type of Home Care Services:  None  Patient expects to be discharged to:  home    Prior SNF/Rehab Placement and Facility: [facility], [facility]  Agreeable to SNF/Rehab: No  [place] of choice provided: n/a   Evaluation: yes    Expected Discharge date:  21  Follow Up Appointment: Best Day/ Time: Tuesday AM    Transportation provider: [email or web address]  Transportation arrangements needed for discharge: TBD    Discharge Plan: Met with patient to discuss dc options. Patient lives alone in senior apartment. She has walker,cane, shower chair, nebulizer, glucometer, life alert . Discussed therapy recommendation of SNF. Patient refused. She states that she would like to go home with home therapy. She would like to use the agency that she used in the past, but she can not remember the name. Will meet again with patient to finalize dc plans after therapy notes are in the computer.          Electronically signed by FARHAN Garcia on 21 at 11:08 AM EDT

## 2021-06-23 LAB
ABSOLUTE EOS #: 0.1 K/UL (ref 0–0.44)
ABSOLUTE IMMATURE GRANULOCYTE: 0.01 K/UL (ref 0–0.3)
ABSOLUTE LYMPH #: 1.4 K/UL (ref 1.1–3.7)
ABSOLUTE MONO #: 0.57 K/UL (ref 0.1–1.2)
ANION GAP SERPL CALCULATED.3IONS-SCNC: 10 MMOL/L (ref 9–17)
BASOPHILS # BLD: 1 % (ref 0–2)
BASOPHILS ABSOLUTE: 0.04 K/UL (ref 0–0.2)
BUN BLDV-MCNC: 13 MG/DL (ref 8–23)
BUN/CREAT BLD: 18 (ref 9–20)
CALCIUM SERPL-MCNC: 8.5 MG/DL (ref 8.6–10.4)
CHLORIDE BLD-SCNC: 105 MMOL/L (ref 98–107)
CO2: 20 MMOL/L (ref 20–31)
CREAT SERPL-MCNC: 0.71 MG/DL (ref 0.5–0.9)
DIFFERENTIAL TYPE: ABNORMAL
EOSINOPHILS RELATIVE PERCENT: 2 % (ref 1–4)
GFR AFRICAN AMERICAN: >60 ML/MIN
GFR NON-AFRICAN AMERICAN: >60 ML/MIN
GFR SERPL CREATININE-BSD FRML MDRD: ABNORMAL ML/MIN/{1.73_M2}
GFR SERPL CREATININE-BSD FRML MDRD: ABNORMAL ML/MIN/{1.73_M2}
GLUCOSE BLD-MCNC: 123 MG/DL (ref 65–105)
GLUCOSE BLD-MCNC: 147 MG/DL (ref 65–105)
GLUCOSE BLD-MCNC: 87 MG/DL (ref 70–99)
GLUCOSE BLD-MCNC: 90 MG/DL (ref 65–105)
GLUCOSE BLD-MCNC: 91 MG/DL (ref 65–105)
HCT VFR BLD CALC: 29.7 % (ref 36.3–47.1)
HEMOGLOBIN: 9.5 G/DL (ref 11.9–15.1)
IMMATURE GRANULOCYTES: 0 %
LYMPHOCYTES # BLD: 27 % (ref 24–43)
MCH RBC QN AUTO: 31.6 PG (ref 25.2–33.5)
MCHC RBC AUTO-ENTMCNC: 32 G/DL (ref 28.4–34.8)
MCV RBC AUTO: 98.7 FL (ref 82.6–102.9)
MONOCYTES # BLD: 11 % (ref 3–12)
NRBC AUTOMATED: 0 PER 100 WBC
PDW BLD-RTO: 13.9 % (ref 11.8–14.4)
PLATELET # BLD: 158 K/UL (ref 138–453)
PLATELET ESTIMATE: ABNORMAL
PMV BLD AUTO: 10.7 FL (ref 8.1–13.5)
POTASSIUM SERPL-SCNC: 3.9 MMOL/L (ref 3.7–5.3)
RBC # BLD: 3.01 M/UL (ref 3.95–5.11)
RBC # BLD: ABNORMAL 10*6/UL
SEG NEUTROPHILS: 59 % (ref 36–65)
SEGMENTED NEUTROPHILS ABSOLUTE COUNT: 3.08 K/UL (ref 1.5–8.1)
SODIUM BLD-SCNC: 135 MMOL/L (ref 135–144)
WBC # BLD: 5.2 K/UL (ref 3.5–11.3)
WBC # BLD: ABNORMAL 10*3/UL

## 2021-06-23 PROCEDURE — 2580000003 HC RX 258: Performed by: EMERGENCY MEDICINE

## 2021-06-23 PROCEDURE — 36415 COLL VENOUS BLD VENIPUNCTURE: CPT

## 2021-06-23 PROCEDURE — 1200000000 HC SEMI PRIVATE

## 2021-06-23 PROCEDURE — 97530 THERAPEUTIC ACTIVITIES: CPT

## 2021-06-23 PROCEDURE — 80048 BASIC METABOLIC PNL TOTAL CA: CPT

## 2021-06-23 PROCEDURE — 82947 ASSAY GLUCOSE BLOOD QUANT: CPT

## 2021-06-23 PROCEDURE — 94761 N-INVAS EAR/PLS OXIMETRY MLT: CPT

## 2021-06-23 PROCEDURE — G0378 HOSPITAL OBSERVATION PER HR: HCPCS

## 2021-06-23 PROCEDURE — 94640 AIRWAY INHALATION TREATMENT: CPT

## 2021-06-23 PROCEDURE — 85025 COMPLETE CBC W/AUTO DIFF WBC: CPT

## 2021-06-23 PROCEDURE — 97116 GAIT TRAINING THERAPY: CPT

## 2021-06-23 PROCEDURE — 97112 NEUROMUSCULAR REEDUCATION: CPT

## 2021-06-23 PROCEDURE — 97535 SELF CARE MNGMENT TRAINING: CPT

## 2021-06-23 PROCEDURE — 6370000000 HC RX 637 (ALT 250 FOR IP): Performed by: INTERNAL MEDICINE

## 2021-06-23 RX ADMIN — PRAVASTATIN SODIUM 80 MG: 40 TABLET ORAL at 21:17

## 2021-06-23 RX ADMIN — SODIUM CHLORIDE: 9 INJECTION, SOLUTION INTRAVENOUS at 19:12

## 2021-06-23 RX ADMIN — BUDESONIDE AND FORMOTEROL FUMARATE DIHYDRATE 2 PUFF: 160; 4.5 AEROSOL RESPIRATORY (INHALATION) at 21:15

## 2021-06-23 RX ADMIN — CARVEDILOL 6.25 MG: 6.25 TABLET, FILM COATED ORAL at 08:30

## 2021-06-23 RX ADMIN — SODIUM CHLORIDE: 9 INJECTION, SOLUTION INTRAVENOUS at 08:30

## 2021-06-23 RX ADMIN — ACETAMINOPHEN 650 MG: 325 TABLET ORAL at 21:16

## 2021-06-23 RX ADMIN — APIXABAN 2.5 MG: 2.5 TABLET, FILM COATED ORAL at 21:18

## 2021-06-23 RX ADMIN — BUDESONIDE AND FORMOTEROL FUMARATE DIHYDRATE 2 PUFF: 160; 4.5 AEROSOL RESPIRATORY (INHALATION) at 10:14

## 2021-06-23 RX ADMIN — PANTOPRAZOLE SODIUM 40 MG: 40 TABLET, DELAYED RELEASE ORAL at 06:02

## 2021-06-23 RX ADMIN — Medication 3 MG: at 21:16

## 2021-06-23 RX ADMIN — Medication 3 MG: at 00:43

## 2021-06-23 RX ADMIN — CARVEDILOL 6.25 MG: 6.25 TABLET, FILM COATED ORAL at 21:18

## 2021-06-23 RX ADMIN — ACETAMINOPHEN 650 MG: 325 TABLET ORAL at 00:43

## 2021-06-23 RX ADMIN — APIXABAN 2.5 MG: 2.5 TABLET, FILM COATED ORAL at 08:30

## 2021-06-23 RX ADMIN — INSULIN LISPRO 1 UNITS: 100 INJECTION, SOLUTION INTRAVENOUS; SUBCUTANEOUS at 12:30

## 2021-06-23 ASSESSMENT — PAIN SCALES - GENERAL
PAINLEVEL_OUTOF10: 3
PAINLEVEL_OUTOF10: 5
PAINLEVEL_OUTOF10: 0

## 2021-06-23 NOTE — PROGRESS NOTES
Physical Therapy  Facility/Department: [facility]  Daily Treatment Note  NAME: Suze Almeida  : 1932  MRN: [id number]    Date of Service: 2021    Discharge Recommendations:  Patient would benefit from continued therapy after discharge        Assessment   Body structures, Functions, Activity limitations: Decreased functional mobility ; Decreased strength;Decreased endurance;Decreased ADL status; Decreased safe awareness;Decreased balance  Assessment: Pt with deficits of bed mobility, transfers, ambulation, balance, cognition, safety awareness and endurance this session,  & required 2 assist for safe functional mobility, transfers & gait. , & is decline compared to prior level of function. With current deficits, Pt HIGH risk for falls & requires continued PT to maximize independence with functional mobility, balance, safety awareness & activity tolerance. Pt currently functioning below baseline. Would suggest additional therapy at time of discharge to maximize long term outcomes and prevent re-admission. Please refer to AM-PAC score for current level of function. Prognosis: Good  Decision Making: High Complexity  Exam: ROM, MMT, functional mobility, activity tolerance, Balance, & [employer] AM-PAC 6 Clicks Basic Mobility  Clinical Presentation: evolving  PT Education: Goals;PT Role;Plan of Care;Precautions;Transfer Training;Functional Mobility Training;Gait Training;General Safety  Patient Education: Ed pt on functional mobility, safety awareness, ex's &  NMR  REQUIRES PT FOLLOW UP: Yes  Activity Tolerance  Activity Tolerance: Patient limited by fatigue;Patient limited by endurance     Patient Diagnosis(es): The primary encounter diagnosis was Dizziness. A diagnosis of Acute cystitis without hematuria was also pertinent to this visit.      has a past medical history of Atrial fibrillation ([address] CAD (coronary artery disease), CHF (congestive heart failure) ([address] COPD (chronic obstructive pulmonary disease) ([facility] [address] CVA (cerebral vascular accident) ([facility] [address] Esophageal reflux, Other and unspecified hyperlipidemia, Other primary cardiomyopathies, Type II or unspecified type diabetes mellitus without mention of complication, not stated as uncontrolled, Unspecified asthma(493.90), and Unspecified essential hypertension. has a past surgical history that includes Coronary artery bypass graft; pacemaker placement (2014); Hysterectomy; Wrist fracture surgery (Left, 01/25/2019); and Wrist Closed Reduction (Left, 1/25/2019). Restrictions  Restrictions/Precautions  Restrictions/Precautions: Fall Risk, General Precautions  Implants present? : Pacemaker  Position Activity Restriction  Other position/activity restrictions: up with assist, ALARMS, Heels off bed at all times, RUE IV  Subjective   General  Chart Reviewed: Yes  Additional Pertinent Hx: DM,atrial fibrillation, pacemaker, CAD, CABG  Response To Previous Treatment: Patient with no complaints from previous session. Subjective  Subjective: Pt agreeable to PT  General Comment  Comments: RN okays PT  Pain Screening  Patient Currently in Pain: Denies  Vital Signs  BP Location: Right Arm  Level of Consciousness: Alert (0)  Patient Currently in Pain: Denies  Oxygen Therapy  O2 Device: None (Room air)       Orientation  Orientation  Overall Orientation Status: Within Functional Limits  Cognition   Cognition  Overall Cognitive Status: Exceptions  Arousal/Alertness: Delayed responses to stimuli  Following Commands: Follows one step commands with increased time; Follows one step commands with repetition  Attention Span: Attends with cues to redirect  Memory: Decreased long term memory;Decreased short term memory  Safety Judgement: Decreased awareness of need for assistance;Decreased awareness of need for safety  Problem Solving: Assistance required to identify errors made;Assistance required to correct errors made;Assistance required to generate to promote mobility and functional pre gait activities & completed static seated standing weight shifts & reaches x 10 reps to improve core strength & stability    All lines intact, call light within reach, and patient positioned comfortably at end of treatment. All patient needs addressed prior to ending therapy session. G-Code     OutComes Score                                                     AM-PAC Score  AM-PAC Inpatient Mobility Raw Score : 13 (06/23/21 1431)  AM-PAC Inpatient T-Scale Score : 36.74 (06/23/21 1431)  Mobility Inpatient CMS 0-100% Score: 64.91 (06/23/21 1431)  Mobility Inpatient CMS G-Code Modifier : CL (06/23/21 1431)          Goals  Short term goals  Time Frame for Short term goals: 12 visits  Short term goal 1: Inc bed-mobility & transfers to independent to enable pt to safely get in/OOB & chair  Short term goal 2: Inc gait to amb 250ft or > indep w/ RW to enable pt to return to previous level of independence  Short term goal 3:  Inc strength to [address] standing balance to good with device to facilitate pt independence for performance of ADL's & functional mobility, & reduce fall risk  Short term goal 4: Pt able to tolerate 30-40 min of activity to include 15-20 reps of ex, NMR & functional mobility including 5 minutes of standing with device to facilitate activity tolerance to [facility]  Short term goal 5: Ed pt on home ex's, safety & energy principles, fall prevention, & issue written home program    Plan    Plan  Times per week: 1-2x/D, 5-6d/week  Current Treatment Recommendations: Strengthening, Balance Training, Functional Mobility Training, Transfer Training, Endurance Training, Gait Training, Home Exercise Program, Safety Education & Training, Patient/Caregiver Education & Training  Safety Devices  Type of devices: Bed alarm in place, Call light within reach, Chair alarm in place, Gait belt, Patient at risk for falls, Left in chair     Therapy Time   Individual Concurrent Group Co-treatment   Time In 7537     8953   Time Out 9836     0375   Minutes 36     21        Co-treatment with OT warranted secondary to decreased safety and independence requiring 2 skilled therapy professionals to address individual discipline's goals. PT addressing pre gait trunk strengthening, weight shifting prior to transfers, transfer training and postural control in sitting.     [address], PT

## 2021-06-23 NOTE — PROGRESS NOTES
Physician Progress Note      PATIENT:               Isabel Tenorio  CSN #:                  [id number]  :                       1932  ADMIT DATE:       2021 5:42 PM  [address] [place] DATE:  RESPONDING  PROVIDER #:        Charo Escobar MD          QUERY TEXT:    Pt admitted with dizziness. Pt noted to have documentation of \"likely slightly   dehydrated\" by cardiology consultant in note on  and evidence of sinus   disease on CT brain . If possible, please document in progress notes and   discharge summary the likely etiology of patient's dizziness: The medical record reflects the following:  Risk Factors: advanced age, home medication lisinopril for chronic diastolic   CHF, report of loose stools and poor appetite by patient on admission  Clinical Indicators: orthostatic VS negative;   Per  cardiology consultant   note:  dizziness probably multifactorial, likely slightly dehydrated;    BUN/CR:  17/0.78 on , 15/0.63 on  and 13/0.71 on ;   CT Brain:    Bilateral maxillary and ethmoid sinus disease.   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  Treatment: hospital admission, diagnostic testing, cardiology consult,   lisinopril not reordered, IVF s 100ml/hr, on IV Rocephin for UTI,  Options provided:  -- Dizziness related to dehydration on admission  -- Dizziness related to ethmoid and maxillary sinusitis  -- Dizziness related to Chiari 1 malformation  -- Other - I will add my own diagnosis  -- Disagree - Not applicable / Not valid  -- Disagree - Clinically unable to determine / Unknown  -- Refer to Clinical Documentation Reviewer    PROVIDER RESPONSE TEXT:    See progress note    Query created by: Nely Jackson on 2021 11:15 AM      Electronically signed by:  Charo Escobar MD 2021 7:28 PM

## 2021-06-23 NOTE — PLAN OF CARE
Problem: Skin Integrity:  Goal: Will show no infection signs and symptoms  Description: Will show no infection signs and symptoms  6/23/2021 1048 by Angel Mcpherson RN  Outcome: Ongoing     Problem: Skin Integrity:  Goal: Absence of new skin breakdown  Description: Absence of new skin breakdown  6/23/2021 1048 by Angel Mcpherson RN  Outcome: Ongoing     Problem: Falls - Risk of:  Goal: Will remain free from falls  Description: Will remain free from falls  6/23/2021 1048 by Angel Mcpherson RN  Outcome: Ongoing  Note: Patient remains free from injuries and falls, appropriate measures in place       Problem: Falls - Risk of:  Goal: Absence of physical injury  Description: Absence of physical injury  6/23/2021 1048 by Angel Mcpherson RN  Outcome: Ongoing     Problem: Discharge Planning:  Goal: Discharged to appropriate level of care  Description: Discharged to appropriate level of care  6/23/2021 1048 by Angel Mcpherson RN  Outcome: Ongoing     Problem: Urinary Elimination:  Goal: Signs and symptoms of infection will decrease  Description: Signs and symptoms of infection will decrease  6/23/2021 1048 by Angel Mcpherson RN  Outcome: Ongoing     Problem: Urinary Elimination:  Goal: Complications related to the disease process, condition or treatment will be avoided or minimized  Description: Complications related to the disease process, condition or treatment will be avoided or minimized  6/23/2021 1048 by Angel Mcpherson RN  Outcome: Ongoing     Problem: Serum Glucose Level - Abnormal:  Goal: Ability to maintain appropriate glucose levels will improve  Description: Ability to maintain appropriate glucose levels will improve  6/23/2021 1048 by Angel Mcpherson RN  Outcome: Ongoing  Note: Patient's blood sugars continue to be checked before meals and before bed. Sliding scale insulin available for blood sugars 140 or greater. Physician updated as needed.       Problem: Sensory Perception - Impaired:  Goal: Ability to maintain a stable neurologic state will improve  Description: Ability to maintain a stable neurologic state will improve  6/23/2021 1048 by Truong Ferguson RN  Outcome: Ongoing  Note: Patient remains alert and oriented x4, can be forgetful at times. Problem: Pain:  Goal: Control of chronic pain  Description: Control of chronic pain  Outcome: Ongoing  Note: Pt medicated with pain medication prn. Assessed all pain characteristics including level, type, location, frequency, and onset. Non-pharmacologic interventions offered to pt as well. Pt states pain is tolerable at this time.  Will continue to monitor

## 2021-06-23 NOTE — PROGRESS NOTES
Physician Progress Note      PATIENT:               Jens Coronel  CSN #:                  [id number]  :                       1932  ADMIT DATE:       2021 5:42 PM  [address] [place] DATE:  RESPONDING  PROVIDER #:        Matteo Wyatt MD          QUERY TEXT:    Patient admitted with dizziness. If possible, please document in progress   notes and discharge summary if you are evaluating and /or treating any of the   following: The medical record reflects the following:  Risk Factors: report of poor appetite for \"years\" and never feels hungry,   loose stools  Clinical Indicators:  dietician consult/evaluation:  meets for severe   malnutrition in the setting of chronic illness based on:  Energy Intake:  7 -   75% or less estimated energy requirements for 1 month or longer, Weight Loss:    1 - Mild weight loss (specify amount and time period) (31.4% weight loss over   two years), Body Fat Loss:  7 - Severe body fat loss Triceps; Muscle Mass   Loss:  7 - Severe muscle mass loss Clavicles (pectoralis & deltoids), Hand   (interosseous)  Treatment: dietician consult, Glucerna supplements twice daily  Options provided:  -- Protein calorie malnutrition severe  -- Other - I will add my own diagnosis  -- Disagree - Not applicable / Not valid  -- Disagree - Clinically unable to determine / Unknown  -- Refer to Clinical Documentation Reviewer    PROVIDER RESPONSE TEXT:    This patient has severe protein calorie malnutrition.     Query created by: Travis Person on 2021 11:19 AM      Electronically signed by:  Matteo Wyatt MD 2021 7:43 PM

## 2021-06-23 NOTE — CARE COORDINATION
Social Work-Met with patient. She spoke with her doctor and she would like to go to SNF for rehab. The Plan for Transition of Care is related to the following treatment goals: SNF with PT/OT and skilled nursing    The Patient and/or patient representative patient was provided with a choice of provider and agrees   with the discharge plan. [x] Yes [] No    Freedom of choice list was provided with basic dialogue that supports the patient's individualized plan of care/goals, treatment preferences and shares the quality data associated with the providers. [x] Yes [] No. Her first choice is [facility]. Sent referral. They approved patient for admission and began precert.  Nani Berman

## 2021-06-23 NOTE — PROGRESS NOTES
Occupational Therapy   Occupational Therapy Initial Assessment  Date: 2021   Patient Name: Corinne Doss  MRN: [id number]     : 1932    Date of Service: 2021     BENJAMIN Pacheco reports patient is medically stable for therapy treatment this date. Chart reviewed prior to treatment and patient is agreeable for therapy. All lines intact and patient positioned comfortably at end of treatment. All patient needs addressed prior to ending therapy session. Discharge Recommendations:  Patient would benefit from continued therapy after discharge  OT Equipment Recommendations  ADL Assistive Devices: Reacher;Sock-Aid Soft;Long-handled Shoe Horn;Long-handled Sponge    Assessment   Performance deficits / Impairments: Decreased functional mobility ; Decreased ADL status; Decreased safe awareness;Decreased balance;Decreased coordination;Decreased cognition;Decreased posture;Decreased endurance;Decreased high-level IADLs;Decreased strength;Decreased fine motor control  Assessment: Skilled OT is indicated to increase overall strength, balance and act melvin as well as I/safety in function to return home with assist as needed and at PLOF. Pt was confused with the switch from RN to OT/PT at beginning of session but tolerated treatment well. Pt still required x2 staff for balance and safety. Prognosis: Fair  Decision Making: High Complexity  OT Education: OT Role;Transfer Training;Orientation;Plan of Care;Energy Conservation; Family Education  Patient Education: safety in function, call light use, fall prevention, pursed lip breathing, recommendations for continued therapy, benefits of being up OOB  Barriers to Learning: cognitive deficits  REQUIRES OT FOLLOW UP: Yes  Activity Tolerance  Activity Tolerance: Patient limited by fatigue;Patient Tolerated treatment well  Activity Tolerance: poor plus  Safety Devices  Safety Devices in place: Yes  Type of devices: Call light within reach; Chair alarm in place; Left in chair;Patient at risk for falls;Gait belt;Nurse notified; All fall risk precautions in place (Pt was positioned up in chair with BLE elevated on stool/pillow.)           Patient Diagnosis(es): The primary encounter diagnosis was Dizziness. A diagnosis of Acute cystitis without hematuria was also pertinent to this visit. has a past medical history of Atrial fibrillation ([facility] [address] CAD (coronary artery disease), CHF (congestive heart failure) ([facility] [address] COPD (chronic obstructive pulmonary disease) ([facility] [address] CVA (cerebral vascular accident) ([facility] [address] Esophageal reflux, Other and unspecified hyperlipidemia, Other primary cardiomyopathies, Type II or unspecified type diabetes mellitus without mention of complication, not stated as uncontrolled, Unspecified asthma(493.90), and Unspecified essential hypertension. has a past surgical history that includes Coronary artery bypass graft; pacemaker placement (2014); Hysterectomy; Wrist fracture surgery (Left, 01/25/2019); and Wrist Closed Reduction (Left, 1/25/2019).            Restrictions  Restrictions/Precautions  Restrictions/Precautions: Fall Risk, General Precautions  Implants present? : Pacemaker  Position Activity Restriction  Other position/activity restrictions: up with assist, ALARMS, Heels off bed at all times, RUE IV    Subjective   General  Chart Reviewed: Yes  Patient assessed for rehabilitation services?: Yes  Family / Caregiver Present: No  Patient Currently in Pain: Denies    Social/Functional History  Social/Functional History  Lives With: Alone  Type of Home: Apartment  Home Layout: One level (laundry in apt)  Home Access: Level entry  Bathroom Shower/Tub: Tub/Shower unit  Bathroom Toilet: Handicap height  Bathroom Equipment: Grab bars in shower, Shower chair  Home Equipment: Rolling walker, 4 wheeled walker, Alert [employer] Help From: Family (pt has a supportive family)  ADL Assistance: [facility] [address]: Independent  Homemaking Responsibilities: Yes  Ambulation Assistance: Independent (uses R/W)  Transfer Assistance: Independent  Active : No  Mode of Transportation: Family  Occupation: Retired  Type of occupation:  TPS  Leisure & Hobbies: TV, reading, games  Additional Comments: Pt reports dizziness but denies falls. Pt gave limited information due to lethargy and son present & provided more details. Objective        Orientation  Overall Orientation Status: Impaired  Orientation Level: Disoriented to place;Oriented to person (time/year not assessed)     Balance  Sitting Balance: Contact guard assistance  Standing Balance: Moderate assistance (x2 with RW)  Standing Balance  Time: ~ 4 min for self care and funtional mob    Functional Mobility  Functional - Mobility Device: Rolling Walker  Activity:  (toilet to sink, sink to chair)  Assist Level: Moderate assistance (x2 staff)  Functional Mobility Comments: MOD verbal cueing/tactile assist for RW safety, pacing/slowing down, sequencing, initiation, and line mgt for overall safety. Toilet Transfers  Toilet - Technique: Ambulating (with RW)  Equipment Used: Grab bars  Toilet Transfer: 2 Person assistance; Moderate assistance (Pt was seated on toilet upon arrival with RN, so only sit to stand from toilet was assessed.)  Toilet Transfers Comments: MAX verbal cueing/tactile assist for UE placement on grab bar, using RW after standing and keeping it close to transfer, pacing/slowing down, and line mgt to reduce risk of falls. ADL  Grooming: Moderate assistance (Oral care and combing hair performed standing at sink with assist needed to open containers and for standing balance.)  UE Dressing: Moderate assistance (to change hosp gown while seated on toilet)  Toileting: Dependent/Total (Pt was on toilet upon arrival. Assist x2 needed for clothing mgt (hosp gown, underwear, sanitary pad, and pants) and balance.  Pt attempted to manage clothing but was unable to without assist.)  Additional Comments: *Pt is DEP at this time when up with RW for safety/balance support due to increased posterior lean and 2 staff needed. Pt requires MOD verbal cueing/tactile assist for sequencing, pacing/slowing down, and awareness/assist with line mgt. *Pt declined to have bathing tasks completed and reported she completed them earlier today. Bed mobility  Rolling to Right: Unable to assess  Supine to Sit: Unable to assess  Scooting: Unable to assess (Pt was seated on toilet with RN upon writer's arrival.)    Transfers  Stand Step Transfers: Moderate assistance;2 Person assistance (with RW)  Sit to stand: Moderate assistance;2 Person assistance  Stand to sit: Moderate assistance;2 Person assistance  Transfer Comments: MAX verbal cueing/tactile assist for B hand placement to push up to standing/reach back to sit, squaring self/AD up to transfer surface, RW safety, initiation, sequencing, and line mgt to reduce falls. Cognition  Overall Cognitive Status: Exceptions  Arousal/Alertness: Delayed responses to stimuli  Following Commands: Follows one step commands with increased time; Follows one step commands with repetition  Attention Span: Attends with cues to redirect  Memory: Decreased long term memory;Decreased short term memory  Safety Judgement: Decreased awareness of need for assistance;Decreased awareness of need for safety  Problem Solving: Assistance required to identify errors made;Assistance required to correct errors made;Assistance required to generate solutions;Assistance required to implement solutions;Decreased awareness of errors  Insights: Decreased awareness of deficits  Initiation: Requires cues for all  Sequencing: Requires cues for all                                        Plan   Plan  Times per week: 4-5x/week 1x/day as melvin  Current Treatment Recommendations: Strengthening, Endurance Training, Neuromuscular Re-education, Patient/Caregiver Education & Training, Self-Care / ADL, Equipment Evaluation, Education, & procurement, Cognitive Reorientation, Cognitive/Perceptual Training, Home Management Training, Balance Training, Functional Mobility Training, Safety Education & Training, Positioning      AM-PAC Score   11          Goals  Short term goals  Time Frame for Short term goals: by discharge, pt to demo  Short term goal 1: bed mob tasks with use of rail as needed to mod assist of 1. Short term goal 2: increase BUE strength by a 1/2 grade to assist with care and complete simple BUE HEP with SUP and use of handouts as needed. Short term goal 3: UB ADL to set up and LB ADL to mod assist of 1 and use of AD/AE as needed. Short term goal 4: toileting tasks with use of grab bar/AD as needed to mod assist of 1. Short term goal 5: ADL transfers and functional mob with AD as needed and min assist of 1. Long term goals  Long term goal 1: Pt to stand with CG and AD melvin > 5 mins as able to reduce falls with ADL tasks. Long term goal 2: Caregivers to demo I with BUE HEP, pressure relief, EC/WS and fall prevention tech, DME/AE recommendations with use of handouts. Patient Goals   Patient goals : Pt states she wants to go home!        Therapy Time   Individual Concurrent Group Co-treatment   Time In [address]         Time Out 1318         Minutes 24                 Yon Lu

## 2021-06-23 NOTE — DISCHARGE INSTR - COC
Continuity of Care Form    Patient Name: Danuta Velazco   :  1932  MRN:  [id number]    Admit date:  2021  Discharge date:  2021    Code Status Order: Full Code   Advance Directives:   [facility] Documentation       Date/Time Healthcare Directive Type of Healthcare Directive Copy in [address] Agent's Name Healthcare Agent's Phone Number    [phone]  Yes, patient has an advance directive for healthcare treatment  Living will   [place]            Admitting Physician:  Zeus Delgadillo MD  PCP: Mee Babinski, MD    Discharging Nurse: [facility] Unit/Room#:   Discharging Unit Phone Number: [phone]    Emergency Contact:   Extended Emergency Contact Information  Primary Emergency Contact: Isaias Rosa  Address: [place] [address] Phone: [phone]  Relation: Child    Past Surgical History:  Past Surgical History:   Procedure Laterality Date    CORONARY ARTERY BYPASS GRAFT      HYSTERECTOMY      PACEMAKER PLACEMENT      Horizontal Systemstronic    WRIST CLOSED REDUCTION Left 2019    CAST REMOVED FROM LEFT ARM . LEFT DISTAL RADIUS CLOSED REDUCTION PERCUTANEOUS  PINNING, C- ARM, K-WIRES APPLICATION OF A CAST performed by Vishnu Mcleod MD at [place] [address] Left 2019    CAST REMOVED FROM LEFT ARM .  LEFT DISTAL RADIUS CLOSED REDUCTION PINNING       Immunization History:   Immunization History   Administered Date(s) Administered    Influenza, Quadv, IM, PF (6 mo and older Fluzone, Flulaval, Fluarix, and 3 yrs and older Afluria) 10/02/2017    Tdap (Boostrix, Adacel) 2018       Active Problems:  Patient Active Problem List   Diagnosis Code    Atrial fibrillation ([address] I48.91    Pacemaker Z95.0    CAD (coronary artery disease) I25.10    Hyperlipidemia E78.5    Hx of CABG Z95.1    H/O: CVA (cerebrovascular accident) Z80.78    Varicose veins of left lower extremity I83.92  Mild mitral regurgitation I34.0    Bilateral leg edema R60.0    Normocytic normochromic anemia D64.9    Bilateral carotid artery stenosis I65.23    Ataxia R27.0    Lethargy R53.83    Fall at home W19. 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 ([facility]) I82.411    Acute CVA (cerebrovascular accident) ([facility] [address] I63.9    Diabetes mellitus type 2, diet-controlled [facility] E11.9    Bleeding R58    Syncope and collapse R55    Closed fracture of left wrist S62.102A    UTI (urinary tract infection) N39.0    Severe malnutrition ([facility]) E43       Isolation/Infection:   Isolation            No Isolation          Patient Infection Status       None to display            Nurse Assessment:  Last Vital Signs: /60   Pulse 61   Temp 97.5 °F (36.4 °C) (Oral)   Resp 16   Ht 5' 2\" (1.575 m)   Wt 118 lb (53.5 kg)   SpO2 100%   BMI 21.58 kg/m²     Last documented pain score (0-10 scale): Pain Level: 0  Last Weight:   Wt Readings from Last 1 Encounters:   06/21/21 118 lb (53.5 kg)     Mental Status:  oriented and alert    IV Access:  - None    Nursing Mobility/ADLs:  Walking   Assisted  Transfer  Assisted  Bathing  Assisted  Dressing  Assisted  Toileting  Assisted  Feeding  Independent  Med Admin  Assisted  Med Delivery   whole    Wound Care Documentation and Therapy:        Elimination:  Continence:   · Bowel: Yes  · Bladder: Yes  Urinary Catheter: None   Colostomy/Ileostomy/Ileal Conduit: No       Date of Last BM: ***    Intake/Output Summary (Last 24 hours) at 6/23/2021 1424  Last data filed at 6/23/2021 1309  Gross per 24 hour   Intake 2847 ml   Output 2300 ml   Net 547 ml     I/O last 3 completed shifts: In: 2967 [P.O.:720;  I.V.:2247]  Out: 2975 [Urine:2975]    Safety Concerns:     History of Falls (last 30 days) and At Risk for Falls    Impairments/Disabilities: None    Nutrition Therapy:  Current Nutrition Therapy:   - Oral Diet:  Carb Control 4 carbs/meal (1800kcals/day)    Routes of Feeding: Oral  Liquids: No Restrictions  Daily Fluid Restriction: no  Last Modified Barium Swallow with Video (Video Swallowing Test): not done    Treatments at the Time of Hospital Discharge:   Respiratory Treatments: no  Oxygen Therapy:  is not on home oxygen therapy. Ventilator:    - No ventilator support    Rehab Therapies: Physical Therapy and Occupational Therapy  Weight Bearing Status/Restrictions: No weight bearing restirctions  Other Medical Equipment (for information only, NOT a DME order):  walker  Other Treatments: skilled nursing assessment, medication compliance     Patient's personal belongings (please select all that are sent with patient):  Glasses, Dentures upper, cell phone,purse, wallet,clothing     RN SIGNATURE:  Electronically signed by Pranay Cordova RN on 6/23/21 at 2:27 PM EDT    CASE MANAGEMENT/SOCIAL WORK SECTION    Inpatient Status Date: ***    Readmission Risk Assessment Score:  Readmission Risk              Risk of Unplanned Readmission:  16           Discharging to Facility/ Agency    Name: Leeann Manley Address:  Phone:    · Fax:     · Address:  · Phone:  · Fax:  . Rose Christian [phone] fax [phone]  Dialysis Facility (if applicable)   · Name:  · Address:  · Dialysis Schedule:  · Phone:  · Fax:    / signature: Electronically signed by FARHAN Hackett on 6/24/21 at 4:07 PM EDT    PHYSICIAN SECTION    Prognosis: Fair    Condition at Discharge: Stable    Rehab Potential (if transferring to Rehab): Fair    Recommended Labs or Other Treatments After Discharge: bmp cbc 3days    Physician Certification: I certify the above information and transfer of Matteo Vera  is necessary for the continuing treatment of the diagnosis listed and that she requires Home Care for less 30 days.      Update Admission H&P: No change in

## 2021-06-23 NOTE — PROGRESS NOTES
Subjective:    Dizziness  Doing fairly well no more dizziness unsteady gait  ROS  No fever no chills no GI/ complaints no cardiopulmonary complaints no TIA no bleeding no headache no sore throat no skin lesion no polyuria no polydipsia no hypoglycemia  physical exam  General Appearance: in no acute distress and alert  Skin: warm and dry, no rash or erythema  Head: normocephalic and atraumatic  Eyes: pupils equal, round, and reactive to light, conjunctivae normal and sclera anicteric  Neck: neck supple and non tender without mass   Pulmonary/Chest: clear to auscultation bilaterally- no wheezes, rales or rhonchi, normal air movement, no respiratory distress  Cardiovascular: normal rate, regular rhythm, normal S1 and S2, no gallops, intact distal pulses and no carotid bruits  Abdomen: soft, non-tender, non-distended, normal bowel sounds, no masses or organomegaly  Extremities: no edema and pulses no Homans' sign  Neurologic: Alert oriented x3 unsteady gait no focal deficit  Loss subcutaneous tissue decreased appetite  BP (!) 141/52   Pulse 64   Temp 97.5 °F (36.4 °C) (Oral)   Resp 16   Ht 5' 2\" (1.575 m)   Wt 118 lb (53.5 kg)   SpO2 98%   BMI 21.58 kg/m²     CBC:   Lab Results   Component Value Date    WBC 5.2 06/23/2021    RBC 3.01 06/23/2021    HGB 9.5 06/23/2021    HCT 29.7 06/23/2021    MCV 98.7 06/23/2021    MCH 31.6 06/23/2021    MCHC 32.0 06/23/2021    RDW 13.9 06/23/2021     06/23/2021    MPV 10.7 06/23/2021     BMP:    Lab Results   Component Value Date     06/23/2021    K 3.9 06/23/2021     06/23/2021    CO2 20 06/23/2021    BUN 13 06/23/2021    LABALBU 3.6 10/02/2017    CREATININE 0.71 06/23/2021    CALCIUM 8.5 06/23/2021    GFRAA >60 06/23/2021    LABGLOM >60 06/23/2021    GLUCOSE 87 06/23/2021    GLUCOSE 74 05/03/2012        Assessment:  Patient Active Problem List   Diagnosis    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)    [facility]) [facility]    Diabetes mellitus type 2, diet-controlled ([facility] [facility]    Bleeding    Syncope and collapse    Closed fracture of left wrist    UTI (urinary tract infection)    Severe malnutrition (HCC)     Dizziness lightheadedness multifactorial likely to meds minimal dehydration  Atherosclerotic heart disease  Chronic atrial fibrillation  Chronic diastolic CHF  PAD  No evidence of UTI  Type 2 diabetes well controlled\  CT scan sinus disease patient is totally asymptomatic we will check sed rate CRP DC antibiotics  Severe protein calorie malnutrition on supplements dietitian seeing patient  Plan:    Meds labs reviewed continue with DVT prophylaxis with EPC cuffs she is already on chronic anticoagulation physical therapy occupational therapy DC antibiotics currently on protein supplements, medically stable ready for discharge awaiting rehab placement      Kan Mario MD MD  7:15 PM

## 2021-06-24 VITALS
DIASTOLIC BLOOD PRESSURE: 52 MMHG | SYSTOLIC BLOOD PRESSURE: 141 MMHG | RESPIRATION RATE: 18 BRPM | BODY MASS INDEX: 21.71 KG/M2 | HEIGHT: 62 IN | OXYGEN SATURATION: 97 % | HEART RATE: 65 BPM | TEMPERATURE: 98.1 F | WEIGHT: 118 LBS

## 2021-06-24 LAB
ABSOLUTE EOS #: 0.08 K/UL (ref 0–0.44)
ABSOLUTE IMMATURE GRANULOCYTE: 0.02 K/UL (ref 0–0.3)
ABSOLUTE LYMPH #: 1.01 K/UL (ref 1.1–3.7)
ABSOLUTE MONO #: 0.89 K/UL (ref 0.1–1.2)
ANION GAP SERPL CALCULATED.3IONS-SCNC: 9 MMOL/L (ref 9–17)
BASOPHILS # BLD: 1 % (ref 0–2)
BASOPHILS ABSOLUTE: 0.04 K/UL (ref 0–0.2)
BUN BLDV-MCNC: 9 MG/DL (ref 8–23)
BUN/CREAT BLD: 15 (ref 9–20)
CALCIUM SERPL-MCNC: 8.9 MG/DL (ref 8.6–10.4)
CHLORIDE BLD-SCNC: 106 MMOL/L (ref 98–107)
CO2: 23 MMOL/L (ref 20–31)
CREAT SERPL-MCNC: 0.62 MG/DL (ref 0.5–0.9)
DIFFERENTIAL TYPE: ABNORMAL
EOSINOPHILS RELATIVE PERCENT: 1 % (ref 1–4)
GFR AFRICAN AMERICAN: >60 ML/MIN
GFR NON-AFRICAN AMERICAN: >60 ML/MIN
GFR SERPL CREATININE-BSD FRML MDRD: NORMAL ML/MIN/{1.73_M2}
GFR SERPL CREATININE-BSD FRML MDRD: NORMAL ML/MIN/{1.73_M2}
GLUCOSE BLD-MCNC: 103 MG/DL (ref 65–105)
GLUCOSE BLD-MCNC: 136 MG/DL (ref 65–105)
GLUCOSE BLD-MCNC: 87 MG/DL (ref 65–105)
GLUCOSE BLD-MCNC: 94 MG/DL (ref 70–99)
HCT VFR BLD CALC: 30.1 % (ref 36.3–47.1)
HEMOGLOBIN: 9.9 G/DL (ref 11.9–15.1)
IMMATURE GRANULOCYTES: 0 %
LYMPHOCYTES # BLD: 14 % (ref 24–43)
MCH RBC QN AUTO: 32 PG (ref 25.2–33.5)
MCHC RBC AUTO-ENTMCNC: 32.9 G/DL (ref 28.4–34.8)
MCV RBC AUTO: 97.4 FL (ref 82.6–102.9)
MONOCYTES # BLD: 12 % (ref 3–12)
NRBC AUTOMATED: 0 PER 100 WBC
PDW BLD-RTO: 13.8 % (ref 11.8–14.4)
PLATELET # BLD: 150 K/UL (ref 138–453)
PLATELET ESTIMATE: ABNORMAL
PMV BLD AUTO: 9.9 FL (ref 8.1–13.5)
POTASSIUM SERPL-SCNC: 3.8 MMOL/L (ref 3.7–5.3)
RBC # BLD: 3.09 M/UL (ref 3.95–5.11)
RBC # BLD: ABNORMAL 10*6/UL
SEG NEUTROPHILS: 72 % (ref 36–65)
SEGMENTED NEUTROPHILS ABSOLUTE COUNT: 5.14 K/UL (ref 1.5–8.1)
SODIUM BLD-SCNC: 138 MMOL/L (ref 135–144)
WBC # BLD: 7.2 K/UL (ref 3.5–11.3)
WBC # BLD: ABNORMAL 10*3/UL

## 2021-06-24 PROCEDURE — 82947 ASSAY GLUCOSE BLOOD QUANT: CPT

## 2021-06-24 PROCEDURE — 36415 COLL VENOUS BLD VENIPUNCTURE: CPT

## 2021-06-24 PROCEDURE — 97110 THERAPEUTIC EXERCISES: CPT

## 2021-06-24 PROCEDURE — 85025 COMPLETE CBC W/AUTO DIFF WBC: CPT

## 2021-06-24 PROCEDURE — 94761 N-INVAS EAR/PLS OXIMETRY MLT: CPT

## 2021-06-24 PROCEDURE — G0378 HOSPITAL OBSERVATION PER HR: HCPCS

## 2021-06-24 PROCEDURE — 97116 GAIT TRAINING THERAPY: CPT

## 2021-06-24 PROCEDURE — 97530 THERAPEUTIC ACTIVITIES: CPT

## 2021-06-24 PROCEDURE — 94640 AIRWAY INHALATION TREATMENT: CPT

## 2021-06-24 PROCEDURE — 6370000000 HC RX 637 (ALT 250 FOR IP): Performed by: INTERNAL MEDICINE

## 2021-06-24 PROCEDURE — 97535 SELF CARE MNGMENT TRAINING: CPT

## 2021-06-24 PROCEDURE — 80048 BASIC METABOLIC PNL TOTAL CA: CPT

## 2021-06-24 RX ADMIN — APIXABAN 2.5 MG: 2.5 TABLET, FILM COATED ORAL at 07:56

## 2021-06-24 RX ADMIN — CARVEDILOL 6.25 MG: 6.25 TABLET, FILM COATED ORAL at 07:56

## 2021-06-24 RX ADMIN — BUDESONIDE AND FORMOTEROL FUMARATE DIHYDRATE 2 PUFF: 160; 4.5 AEROSOL RESPIRATORY (INHALATION) at 08:19

## 2021-06-24 RX ADMIN — PANTOPRAZOLE SODIUM 40 MG: 40 TABLET, DELAYED RELEASE ORAL at 06:21

## 2021-06-24 NOTE — PLAN OF CARE
Problem: Skin Integrity:  Goal: Will show no infection signs and symptoms  Description: Will show no infection signs and symptoms  6/23/2021 2150 by Praveen Gonzalez RN  Outcome: Ongoing     Problem: Skin Integrity:  Goal: Absence of new skin breakdown  Description: Absence of new skin breakdown  6/23/2021 2150 by Praveen Gonzalez RN  Outcome: Ongoing     Problem: Falls - Risk of:  Goal: Will remain free from falls  Description: Will remain free from falls  6/23/2021 2150 by Praveen Gonzalez RN  Outcome: Ongoing     Problem: Falls - Risk of:  Goal: Absence of physical injury  Description: Absence of physical injury  6/23/2021 2150 by Praveen Gonzalez RN  Outcome: Ongoing     Problem: Discharge Planning:  Goal: Discharged to appropriate level of care  Description: Discharged to appropriate level of care  6/23/2021 2150 by Praveen Gonzalez RN  Outcome: Ongoing     Problem: Serum Glucose Level - Abnormal:  Goal: Ability to maintain appropriate glucose levels will improve  Description: Ability to maintain appropriate glucose levels will improve  6/23/2021 2150 by Praveen Gonzalez RN  Outcome: Ongoing

## 2021-06-24 NOTE — PLAN OF CARE
Problem: Skin Integrity:  Goal: Will show no infection signs and symptoms  Description: Will show no infection signs and symptoms  6/24/2021 1112 by Deena Alberto RN  Outcome: Ongoing  6/23/2021 2150 by Gloria Hope RN  Outcome: Ongoing  Goal: Absence of new skin breakdown  Description: Absence of new skin breakdown  6/24/2021 1112 by Deena Alberto RN  Outcome: Ongoing  6/23/2021 2150 by Glroia Hope RN  Outcome: Ongoing     Problem: Falls - Risk of:  Goal: Will remain free from falls  Description: Will remain free from falls  6/24/2021 1112 by Deena Alberto RN  Outcome: Ongoing  6/23/2021 2150 by Gloria Hope RN  Outcome: Ongoing  Goal: Absence of physical injury  Description: Absence of physical injury  6/24/2021 1112 by Deena Alberto RN  Outcome: Ongoing  6/23/2021 2150 by Gloria Hope RN  Outcome: Ongoing     Problem: Discharge Planning:  Goal: Discharged to appropriate level of care  Description: Discharged to appropriate level of care  6/24/2021 1112 by Deena Alberto RN  Outcome: Ongoing  6/23/2021 2150 by Gloria Hope RN  Outcome: Ongoing     Problem: Sensory:  Goal: General experience of comfort will improve  Description: General experience of comfort will improve  Outcome: Ongoing     Problem: Urinary Elimination:  Goal: Signs and symptoms of infection will decrease  Description: Signs and symptoms of infection will decrease  Outcome: Ongoing  Goal: Complications related to the disease process, condition or treatment will be avoided or minimized  Description: Complications related to the disease process, condition or treatment will be avoided or minimized  Outcome: Ongoing     Problem: Serum Glucose Level - Abnormal:  Goal: Ability to maintain appropriate glucose levels will improve  Description: Ability to maintain appropriate glucose levels will improve  6/24/2021 1112 by Deena Alberto RN  Outcome: Ongoing  6/23/2021 2150 by Gloria Hope RN  Outcome: Ongoing     Problem: Sensory Perception - Impaired:  Goal: Ability to maintain a stable neurologic state will improve  Description: Ability to maintain a stable neurologic state will improve  Outcome: Ongoing     Problem:  Activity Intolerance:  Goal: Ability to tolerate increased activity will improve  Description: Ability to tolerate increased activity will improve  Outcome: Ongoing     Problem: Airway Clearance - Ineffective:  Goal: Ability to maintain a clear airway will improve  Description: Ability to maintain a clear airway will improve  Outcome: Ongoing     Problem: Breathing Pattern - Ineffective:  Goal: Ability to achieve and maintain a regular respiratory rate will improve  Description: Ability to achieve and maintain a regular respiratory rate will improve  Outcome: Ongoing     Problem: Gas Exchange - Impaired:  Goal: Levels of oxygenation will improve  Description: Levels of oxygenation will improve  Outcome: Ongoing     Problem: OXYGENATION/RESPIRATORY FUNCTION  Goal: Patient will maintain patent airway  Outcome: Ongoing  Goal: Patient will achieve/maintain normal respiratory rate/effort  Description: Respiratory rate and effort will be within normal limits for the patient  Outcome: Ongoing     Problem: HEMODYNAMIC STATUS  Goal: Patient has stable vital signs and fluid balance  Outcome: Ongoing     Problem: FLUID AND ELECTROLYTE IMBALANCE  Goal: Fluid and electrolyte balance are achieved/maintained  Outcome: Ongoing     Problem: ACTIVITY INTOLERANCE/IMPAIRED MOBILITY  Goal: Mobility/activity is maintained at optimum level for patient  Outcome: Ongoing     Problem: Pain:  Goal: Pain level will decrease  Description: Pain level will decrease  Outcome: Ongoing  Goal: Control of acute pain  Description: Control of acute pain  Outcome: Ongoing  Goal: Control of chronic pain  Description: Control of chronic pain  Outcome: Ongoing     Problem: IP BALANCE  Goal: LTG - Patient will maintain balance to allow for safe/functional mobility  Outcome: Ongoing     Problem: Nutrition  Goal: Optimal nutrition therapy  Outcome: Ongoing     Problem: IP BALANCE  Goal: LTG - Patient will maintain balance to allow for safe/functional mobility  Outcome: Ongoing  Goal: LTG - patient will maintain standing balance to allow for completion of daily activities  Outcome: Ongoing  Goal: LTG - PATIENT WILL MAINTAIN SITTING POSITION WITH APPROPRIATE MID-LINE ORIENTATION  Outcome: Ongoing     Problem: IP MOBILITY  Goal: LTG - patient will ambulate household distance  Outcome: Ongoing  Goal: STG - Patient will ambulate  Outcome: Ongoing     Problem: SAFETY  Goal: LTG - Patient will demonstrate safety requirements appropriate to situation/environment  Outcome: Ongoing  Goal: LTG - patient will utilize safety techniques  Outcome: Ongoing     Problem: IP TRANSFERS  Goal: LTG - patient will transfer to commode  Outcome: Ongoing  Goal: STG - transfer from bed to chair  Outcome: Ongoing  Goal: STG - patient will perform bed mobility  Outcome: Ongoing  Goal: STG - patient will perform toilet transfer  Outcome: Ongoing   Patient is a fall risk during this admission. Fall risk assessment was performed. Patient is absent of falls. Bed is in the lowest position. Wheels on the bed are locked. Call light and bed side table are within reach. Clutter is removed. Patient was educated to call out when needing assistance or wanting to get out of bed. Patient offered toileting assistance during rounding. Hourly rounds have been performed.

## 2021-06-24 NOTE — PLAN OF CARE
Problem: Skin Integrity:  Goal: Will show no infection signs and symptoms  Description: Will show no infection signs and symptoms  6/24/2021 1719 by Asa Hills RN  Outcome: Ongoing  6/24/2021 1112 by Asa Hills RN  Outcome: Ongoing  Goal: Absence of new skin breakdown  Description: Absence of new skin breakdown  6/24/2021 1719 by Asa Hills RN  Outcome: Ongoing  6/24/2021 1112 by Asa Hills RN  Outcome: Ongoing     Problem: Falls - Risk of:  Goal: Will remain free from falls  Description: Will remain free from falls  6/24/2021 1719 by Asa Hills RN  Outcome: Ongoing  6/24/2021 1112 by Asa Hills RN  Outcome: Ongoing  Goal: Absence of physical injury  Description: Absence of physical injury  6/24/2021 1719 by Asa Hills RN  Outcome: Ongoing  6/24/2021 1112 by Asa Hills RN  Outcome: Ongoing     Problem: Discharge Planning:  Goal: Discharged to appropriate level of care  Description: Discharged to appropriate level of care  6/24/2021 1719 by Asa Hills RN  Outcome: Ongoing  6/24/2021 1112 by Asa Hills RN  Outcome: Ongoing     Problem: Sensory:  Goal: General experience of comfort will improve  Description: General experience of comfort will improve  6/24/2021 1719 by Asa Hills RN  Outcome: Ongoing  6/24/2021 1112 by Asa Hills RN  Outcome: Ongoing     Problem: Urinary Elimination:  Goal: Signs and symptoms of infection will decrease  Description: Signs and symptoms of infection will decrease  6/24/2021 1719 by Asa Hills RN  Outcome: Ongoing  6/24/2021 1112 by Asa Hills RN  Outcome: Ongoing  Goal: Complications related to the disease process, condition or treatment will be avoided or minimized  Description: Complications related to the disease process, condition or treatment will be avoided or minimized  6/24/2021 1719 by Asa Hills RN  Outcome: Ongoing  6/24/2021 1112 by Asa Hills RN  Outcome: Ongoing     Problem: Serum Glucose Level - Abnormal:  Goal: Ability to limits for the patient  6/24/2021 1719 by Judith York RN  Outcome: Ongoing  6/24/2021 1112 by Judith York RN  Outcome: Ongoing     Problem: HEMODYNAMIC STATUS  Goal: Patient has stable vital signs and fluid balance  6/24/2021 1719 by Judith York RN  Outcome: Ongoing  6/24/2021 1112 by Judith York RN  Outcome: Ongoing     Problem: FLUID AND ELECTROLYTE IMBALANCE  Goal: Fluid and electrolyte balance are achieved/maintained  6/24/2021 1719 by Judith York RN  Outcome: Ongoing  6/24/2021 1112 by Judith York RN  Outcome: Ongoing     Problem: ACTIVITY INTOLERANCE/IMPAIRED MOBILITY  Goal: Mobility/activity is maintained at optimum level for patient  6/24/2021 1719 by Judith York RN  Outcome: Ongoing  6/24/2021 1112 by Judith York RN  Outcome: Ongoing     Problem: Pain:  Goal: Pain level will decrease  Description: Pain level will decrease  6/24/2021 1719 by Judith York RN  Outcome: Ongoing  6/24/2021 1112 by Judith York RN  Outcome: Ongoing  Goal: Control of acute pain  Description: Control of acute pain  6/24/2021 1719 by Judith York RN  Outcome: Ongoing  6/24/2021 1112 by Judith York RN  Outcome: Ongoing  Goal: Control of chronic pain  Description: Control of chronic pain  6/24/2021 1719 by Judith York RN  Outcome: Ongoing  6/24/2021 1112 by Judith York RN  Outcome: Ongoing     Problem: IP BALANCE  Goal: LTG - Patient will maintain balance to allow for safe/functional mobility  6/24/2021 1719 by Judith York RN  Outcome: Ongoing  6/24/2021 1112 by Judith York RN  Outcome: Ongoing     Problem: Nutrition  Goal: Optimal nutrition therapy  6/24/2021 1719 by Judith York RN  Outcome: Ongoing  6/24/2021 1112 by Judith York RN  Outcome: Ongoing     Problem: IP BALANCE  Goal: LTG - Patient will maintain balance to allow for safe/functional mobility  6/24/2021 1719 by Judith York RN  Outcome: Ongoing  6/24/2021 1112 by Judith York RN  Outcome: Ongoing  Goal: LTG - patient will maintain standing balance to allow for completion of daily activities  6/24/2021 1719 by Marifer Turner RN  Outcome: Ongoing  6/24/2021 1112 by Marifer Turner RN  Outcome: Ongoing  Goal: LTG - PATIENT WILL MAINTAIN SITTING POSITION WITH APPROPRIATE MID-LINE ORIENTATION  6/24/2021 1719 by Marifer Turner RN  Outcome: Ongoing  6/24/2021 1112 by Marifer Turner RN  Outcome: Ongoing     Problem: IP MOBILITY  Goal: LTG - patient will ambulate household distance  6/24/2021 1719 by Marifer Turner RN  Outcome: Ongoing  6/24/2021 1112 by Marifer Turner RN  Outcome: Ongoing  Goal: STG - Patient will ambulate  6/24/2021 1719 by Marifer Turner RN  Outcome: Ongoing  6/24/2021 1112 by Marifer Turner RN  Outcome: Ongoing     Problem: SAFETY  Goal: LTG - Patient will demonstrate safety requirements appropriate to situation/environment  6/24/2021 1719 by Marifer Turner RN  Outcome: Ongoing  6/24/2021 1112 by Marifer Turner RN  Outcome: Ongoing  Goal: LTG - patient will utilize safety techniques  6/24/2021 1719 by Marifer Turner RN  Outcome: Ongoing  6/24/2021 1112 by Marifer Turner RN  Outcome: Ongoing     Problem: IP TRANSFERS  Goal: LTG - patient will transfer to commode  6/24/2021 1719 by Marifer Turner RN  Outcome: Ongoing  6/24/2021 1112 by Marifer Turner RN  Outcome: Ongoing  Goal: STG - transfer from bed to chair  6/24/2021 1719 by Marifer Turner RN  Outcome: Ongoing  6/24/2021 1112 by Marifer Turner RN  Outcome: Ongoing  Goal: STG - patient will perform bed mobility  6/24/2021 1719 by Marifer Turner RN  Outcome: Ongoing  6/24/2021 1112 by Marifer Turner RN  Outcome: Ongoing  Goal: STG - patient will perform toilet transfer  6/24/2021 1719 by Marifer Turner RN  Outcome: Ongoing  6/24/2021 1112 by Marifer Turner RN  Outcome: Ongoing

## 2021-06-24 NOTE — PROGRESS NOTES
Occupational Therapy  Facility/Department: [facility] MED SURG  Daily Treatment Note  NAME: Riley Barone  : 1932  MRN: [id number]    Date of Service: 2021    Discharge Recommendations:  Patient would benefit from continued therapy after discharge       Assessment   Performance deficits / Impairments: Decreased functional mobility ; Decreased ADL status; Decreased safe awareness;Decreased balance;Decreased coordination;Decreased cognition;Decreased posture;Decreased endurance;Decreased high-level IADLs;Decreased strength;Decreased fine motor control  Assessment: Skilled OT is indicated to increase overall strength, balance and act melvin as well as I/safety in function to return home with assist as needed and at PLOF. Pt was confused with the switch from RN to OT/PT at beginning of session but tolerated treatment well. Pt still required x2 staff for balance and safety. Prognosis: Good  REQUIRES OT FOLLOW UP: Yes  Activity Tolerance  Activity Tolerance: Patient Tolerated treatment well  Activity Tolerance: F+  Safety Devices  Safety Devices in place: Yes  Type of devices: Call light within reach; Chair alarm in place; Left in chair;Patient at risk for falls;Gait belt;Nurse notified; All fall risk precautions in place         Patient Diagnosis(es): The primary encounter diagnosis was Dizziness. A diagnosis of Acute cystitis without hematuria was also pertinent to this visit. has a past medical history of Atrial fibrillation ([address] CAD (coronary artery disease), CHF (congestive heart failure) ([address] COPD (chronic obstructive pulmonary disease) ([address] CVA (cerebral vascular accident) ([address] Esophageal reflux, Other and unspecified hyperlipidemia, Other primary cardiomyopathies, Type II or unspecified type diabetes mellitus without mention of complication, not stated as uncontrolled, Unspecified asthma(493.90), and Unspecified essential hypertension.    has a past surgical history that includes Coronary artery bypass Exceptions  Arousal/Alertness: Appropriate responses to stimuli  Following Commands: Follows multistep commands consistently  Attention Span: Attends with cues to redirect  Memory: Decreased short term memory  Safety Judgement: Decreased awareness of need for assistance;Decreased awareness of need for safety  Problem Solving: Assistance required to identify errors made;Assistance required to correct errors made  Insights: Decreased awareness of deficits  Initiation: Requires cues for some  Sequencing: Requires cues for some  Cognition Comment: Pt doing better this date both cognitively and physically. Pt is very friendly and talkative, often needs redirection to task. Perception  Overall Perceptual Status: [facility]                                   Plan   Plan  Times per week: 4-5x/week 1x/day as melvin  Current Treatment Recommendations: Strengthening, Endurance Training, Neuromuscular Re-education, Patient/Caregiver Education & Training, Self-Care / ADL, Equipment Evaluation, Education, & procurement, Cognitive Reorientation, Cognitive/Perceptual Training, Home Management Training, Balance Training, Functional Mobility Training, Safety Education & Training, Positioning                                   AM-PAC Score        AM-[facility] Inpatient Daily Activity Raw Score: 22 (06/24/21 1232)  AM-PAC Inpatient ADL T-Scale Score : 47.1 (06/24/21 1232)  ADL Inpatient CMS 0-100% Score: 25.8 (06/24/21 1232)  ADL Inpatient CMS G-Code Modifier : Melinda Chase (06/24/21 [facility]2)    Goals  Short term goals  Time Frame for Short term goals: by discharge, pt to demo  Short term goal 1: bed mob tasks with use of rail as needed to mod assist of 1. Short term goal 2: increase BUE strength by a 1/2 grade to assist with care and complete simple BUE HEP with SUP and use of handouts as needed. Short term goal 3: UB ADL to set up and LB ADL to mod assist of 1 and use of AD/AE as needed.   Short term goal 4: toileting tasks with use of grab bar/AD as needed to mod assist of 1. Short term goal 5: ADL transfers and functional mob with AD as needed and min assist of 1. Long term goals  Long term goal 1: Pt to stand with CG and AD melvin > 5 mins as able to reduce falls with ADL tasks. Long term goal 2: Caregivers to demo I with BUE HEP, pressure relief, EC/WS and fall prevention tech, DME/AE recommendations with use of handouts. Patient Goals   Patient goals : Pt states she wants to go home!        Therapy Time   Individual Concurrent Group Co-treatment   Time In 1159         Time Out 1226         Minutes [facility]

## 2021-06-24 NOTE — PROGRESS NOTES
Physical Therapy  Facility/Department: [facility] MED SURG  Daily Treatment Note  NAME: Lucía Ojeda  : 1932  MRN: [id number]    Date of Service: 2021    Discharge Recommendations:  Patient would benefit from continued therapy after discharge      Assessment   Body structures, Functions, Activity limitations: Decreased functional mobility ; Decreased strength;Decreased endurance;Decreased ADL status; Decreased safe awareness;Decreased balance    Assessment: Pt demonstrating improvement with safety w/ mobility this date. Pt able to demonstrate safe mobilty in unchallenged enviornment. However, with challenged balance she lost her balance 2 x/ backwards. Pt motivated and willing to work for functional improvement. Will progress as tolerated. Prognosis: Good  PT Education: Goals;PT Role;Plan of Care;Precautions;Transfer Training;Functional Mobility Training;Gait Training;General Safety  Patient Education: Ed pt on functional mobility, safety awareness, ex's &  NMR  REQUIRES PT FOLLOW UP: Yes     Patient Diagnosis(es): The primary encounter diagnosis was Dizziness. A diagnosis of Acute cystitis without hematuria was also pertinent to this visit. has a past medical history of Atrial fibrillation ([address] CAD (coronary artery disease), CHF (congestive heart failure) ([address] COPD (chronic obstructive pulmonary disease) ([address] CVA (cerebral vascular accident) ([address] Esophageal reflux, Other and unspecified hyperlipidemia, Other primary cardiomyopathies, Type II or unspecified type diabetes mellitus without mention of complication, not stated as uncontrolled, Unspecified asthma(493.90), and Unspecified essential hypertension. has a past surgical history that includes Coronary artery bypass graft; pacemaker placement (); Hysterectomy; Wrist fracture surgery (Left, 2019); and Wrist Closed Reduction (Left, 2019).     Restrictions  Restrictions/Precautions  Restrictions/Precautions: Fall Risk, General Precautions, Up as Tolerated  Required Braces or Orthoses?: No  Implants present? : Pacemaker  Position Activity Restriction  Other position/activity restrictions: up with assist, ALARMS, Heels off bed at all times, RUE IV  Subjective   General  Chart Reviewed: Yes  Additional Pertinent Hx: DM,atrial fibrillation, pacemaker, CAD, CABG  Response To Previous Treatment: Patient with no complaints from previous session. Family / Caregiver Present: No  Subjective  Subjective: Pt agreeable to PT. Pt reports her balance is her biggest problem. Her goal is for d/c home. Objective   Bed mobility  Rolling to Right: Modified independent  Supine to Sit: Modified independent  Sit to Supine: Modified independent  Transfers  Sit to Stand: Stand by assistance  Stand to sit: Stand by assistance  Bed to Chair: Stand by assistance  Stand Pivot Transfers: Stand by assistance  Ambulation  Ambulation?: Yes  Ambulation 1  Surface: level tile;uneven  Device: No Device;Rolling Walker  Assistance: Contact guard assistance  Quality of Gait: Pt improved mobility this date. Pt with verbal cueing to increased knee flexion due to shuffle steps and too fast wtih stepping  Gait Deviations: Decreased step length;Decreased step height  Distance: 100 ft x 1; 75 ft x 2;  150 ft x 1;  25 ft x 1. Balance  Posture: Fair  Sitting - Static: Good  Sitting - Dynamic: Good;-  Standing - Static: Good;-  Standing - Dynamic: Fair;+  Exercises  standing balance exercises mod assist +1 w/ retro falling. Standing closed kinetic chain exercises x 12 reps. AM-PAC Score 20/24     Goals  Short term goals  Time Frame for Short term goals: 12 visits  Short term goal 1: Inc bed-mobility & transfers to independent to enable pt to safely get in/OOB & chair  Short term goal 2: Inc gait to amb 250ft or > indep w/ RW to enable pt to return to previous level of independence  Short term goal 3:  Inc strength to [facility] & standing balance to good with device to facilitate pt independence for performance of ADL's & functional mobility, & reduce fall risk  Short term goal 4: Pt able to tolerate 30-40 min of activity to include 15-20 reps of ex, NMR & functional mobility including 5 minutes of standing with device to facilitate activity tolerance to [facility]  Short term goal 5: Ed pt on home ex's, safety & energy principles, fall prevention, & issue written home program  Patient Goals   Patient goals : Pt goal is to go home at time of d/c.     Plan    Plan  Times per week: 1-2x/D, 5-6d/week  Current Treatment Recommendations: Strengthening, Balance Training, Functional Mobility Training, Transfer Training, Endurance Training, Gait Training, Home Exercise Program, Safety Education & Training, Patient/Caregiver Education & Training  Safety Devices  Type of devices: Bed alarm in place, Call light within reach, Chair alarm in place, Gait belt, Patient at risk for falls, Left in chair     Therapy Time   Individual Concurrent Group Co-treatment   Time In 1031         Time Out 1109         Minutes [address], PT

## 2021-06-24 NOTE — DISCHARGE SUMMARY
Physician Discharge Summary     Patient ID:  Hayde Zimmerman  [id number]  38 y.o.  2/12/1932    Admit date: 6/21/2021    Discharge date and time: 6/24/2021    Admission Diagnoses:   Patient Active Problem List   Diagnosis    Atrial fibrillation ([facility]    Pacemaker    CAD (coronary artery disease)    Hyperlipidemia    Hx of CABG    H/O: CVA (cerebrovascular accident)    Varicose veins of left lower extremity    Mild mitral regurgitation    Bilateral leg edema    Normocytic normochromic anemia    Bilateral carotid artery stenosis    Ataxia    Lethargy    Fall at home    COPD (chronic obstructive pulmonary disease) ([facility]    Esophageal reflux    HTN (hypertension), benign    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)    [facility]    Diabetes mellitus type 2, diet-controlled ([facility]    Bleeding    Syncope and collapse    Closed fracture of left wrist    UTI (urinary tract infection)    Severe malnutrition (HCC)       Discharge Diagnoses:   Dizziness  Atherosclerotic heart disease  Chronic diastolic CHF  Chronic atrial fibrillation  Chronic anticoagulation  PAD  Type 2 diabetes controlled  Severe protein calorie malnutrition  Consults: cardiology    Procedures: None    Hospital Course: Patient admitted with the dizziness and unsteadiness no falls denies any chest pain no palpitation patient with known chronic atrial fibrillation on no anticoagulation cardiology was consulted patient did fairly well initially was going for rehab then she decided wants to go home once cleared by physical therapy she was discharged home with home visiting nurse physical therapy occupational    Discharge Exam:  See progress note from today    Disposition: home  Stable improved  Patient Instructions:   Current Discharge Medication List      CONTINUE these medications which have CHANGED    Details   apixaban (ELIQUIS) 2.5 MG TABS tablet Take 1 tablet by mouth 2 times daily  Qty: 60 tablet, Refills: 0         CONTINUE these medications which have NOT CHANGED    Details   lisinopril (PRINIVIL;ZESTRIL) 10 MG tablet Take 10 mg by mouth daily      ferrous sulfate (FE TABS 325) 325 (65 Fe) MG EC tablet Take 325 mg by mouth daily (with breakfast)      ipratropium (ATROVENT) 0.03 % nasal spray 1-2 sprays by Each Nostril route 4 times daily as needed for Rhinitis      Fluticasone-Umeclidin-Vilant (TRELEGY ELLIPTA) 200-62.5-25 MCG/INH AEPB Inhale 1 puff into the lungs daily      acetaminophen-codeine (TYLENOL #3) 300-30 MG per tablet Take 1-2 tablets by mouth every 4 hours as needed for Pain.        albuterol (PROVENTIL) (2.5 MG/3ML) 0.083% nebulizer solution Take 2.5 mg by nebulization every 6 hours as needed for Wheezing      pseudoephedrine-guaiFENesin (MUCINEX D)  MG per extended release tablet Take 1 tablet by mouth 2 times daily as needed for Congestion       albuterol sulfate  (90 Base) MCG/ACT inhaler Inhale 2 puffs into the lungs every 6 hours as needed for Wheezing      carvedilol (COREG) 6.25 MG tablet Take 1 tablet by mouth 2 times daily  Qty: 180 tablet, Refills: 3      omeprazole (PRILOSEC) 20 MG delayed release capsule Take 20 mg by mouth Daily       pravastatin (PRAVACHOL) 80 MG tablet Take 80 mg by mouth daily       Calcium Carbonate-Vitamin D (CALCIUM + D PO) Take 1 tablet by mouth daily       vitamin D3 (CHOLECALCIFEROL) 25 MCG (1000 UT) TABS tablet Take 1,000 Units by mouth daily          STOP taking these medications       ketoconazole (NIZORAL) 2 % cream Comments:   Reason for Stopping:         carbamide peroxide (DEBROX) 6.5 % otic solution Comments:   Reason for Stopping:         furosemide (LASIX) 20 MG tablet Comments:   Reason for Stopping:         acetaminophen (TYLENOL) 325 MG tablet Comments:   Reason for Stopping:         budesonide-formoterol (SYMBICORT) 160-4.5 MCG/ACT AERO Comments:   Reason for Stopping:         ketoconazole (NIZORAL) 2 % cream Comments: Reason for Stopping:         spironolactone (ALDACTONE) 25 MG tablet Comments:   Reason for Stopping:         fluticasone (FLONASE) 50 MCG/ACT nasal spray Comments:   Reason for Stopping:         docusate sodium (COLACE) 100 MG capsule Comments:   Reason for Stopping:             Activity: Up with a walker  Diet: cardiac diet    Follow-up with pCP in 5 days. .Cardiology in 2 to 3 weeks sent home with visiting home nurse home physical therapy occupational therapy  Signed:   Matthew Ball MD MD  6/24/2021  4:42 PM

## 2021-06-24 NOTE — FLOWSHEET NOTE
Writer encountered pt in the hallway with therapy. Pt seems to be calm and approachable, and in good spirits. Writer unable to have spiritual assessment with pt at this time. Chaplains will remain available to offer spiritual and emotional support as needed. 06/24/21 1058   Encounter Summary   Services provided to: Patient not available   Referral/Consult From: Benny   Continue Visiting   (6/24/21 therapy)   Complexity of Encounter Low   Length of Encounter 15 minutes   Routine   Type Follow up   Assessment Calm; Approachable   Intervention Sustaining presence/ Ministry of presence     Electronically signed by Stalin Breaux, on 6/24/2021 at 11:00 AM.  [id number] [facility]  [phone]

## 2021-06-24 NOTE — PROGRESS NOTES
Subjective:     Follow-up dizziness  Doing fairly well denies any dizziness no vertigo no lightheadedness no chest pain no fatigue no syncope ROS  Fever no chills no GI/ complaints no cardiopulmonary complaints no TIA no bleeding no headache no sore throat no skin lesions no polyuria no polydipsia  physical exam  General Appearance: in no acute distress and alert  Skin: warm and dry, no rash or erythema  Head: normocephalic and atraumatic  Eyes: pupils equal, round, and reactive to light, conjunctivae normal and sclera anicteric  Neck: neck supple and non tender without mass   Pulmonary/Chest: clear to auscultation bilaterally- no wheezes, rales or rhonchi, normal air movement, no respiratory distress  Cardiovascular: normal rate, regular rhythm, normal S1 and S2, no gallops, intact distal pulses and no carotid bruits  Abdomen: soft, non-tender, non-distended, normal bowel sounds, no masses or organomegaly  Extremities: no edema and good pulses no Homans  Neurologic: Alert oriented x3 with no focal deficit    BP (!) 141/52   Pulse 65   Temp 98.1 °F (36.7 °C) (Oral)   Resp 18   Ht 5' 2\" (1.575 m)   Wt 118 lb (53.5 kg)   SpO2 97%   BMI 21.58 kg/m²     CBC:   Lab Results   Component Value Date    WBC 7.2 06/24/2021    RBC 3.09 06/24/2021    HGB 9.9 06/24/2021    HCT 30.1 06/24/2021    MCV 97.4 06/24/2021    MCH 32.0 06/24/2021    MCHC 32.9 06/24/2021    RDW 13.8 06/24/2021     06/24/2021    MPV 9.9 06/24/2021     BMP:    Lab Results   Component Value Date     06/24/2021    K 3.8 06/24/2021     06/24/2021    CO2 23 06/24/2021    BUN 9 06/24/2021    LABALBU 3.6 10/02/2017    CREATININE 0.62 06/24/2021    CALCIUM 8.9 06/24/2021    GFRAA >60 06/24/2021    LABGLOM >60 06/24/2021    GLUCOSE 94 06/24/2021    GLUCOSE 74 05/03/2012        Assessment:  Patient Active Problem List   Diagnosis    [facility] fibrillation ([facility]    Pacemaker    CAD (coronary artery disease)    Hyperlipidemia    Hx of CABG  H/O: CVA (cerebrovascular accident)    Varicose veins of left lower extremity    Mild mitral regurgitation    Bilateral leg edema    Normocytic normochromic anemia    Bilateral carotid artery stenosis    Ataxia    Lethargy    Fall at home    COPD [facility]ch[facility] obstructive [facility] disease) ([facility]    Esophageal reflux    HTN (hypertension), benign    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 ([facility])    Acute CVA (cerebrovascular accident) ([facility]    Diabetes mellitus type 2, diet-controlled ([facility]    Bleeding    Syncope and collapse    Closed fracture of left wrist    UTI (urinary tract infection)    Severe malnutrition ([facility])     Dizziness multifactorial resolved  Atherosclerotic heart disease  Chronic diastolic CHF  PAD  Type 2 diabetes controlled  Chronic atrial fibrillation  Unsteady gait  No UTI  Plan:    Was reevaluated by physical therapy able to go home with home physical therapy occupational therapy up with walker one we will send a visiting nurse see orders      Matthew Ball MD MD  4:42 PM

## 2021-06-24 NOTE — CARE COORDINATION
Social Work- [facility] has not received precert. Patient may decide to go home. She has had [facility] in the past and would like [facility], if she is dc home. Notified Mary. She will follow.  Kristine Jackson

## 2021-06-24 NOTE — PROGRESS NOTES
Pt discharged to home per wc. No distress noted.  Script and dc paperwork given to patient and grandson

## 2021-06-24 NOTE — PLAN OF CARE
Problem: Skin Integrity:  Goal: Will show no infection signs and symptoms  Description: Will show no infection signs and symptoms  6/24/2021 1719 by Nuria Ordonez RN  Outcome: Ongoing  6/24/2021 1112 by Nuria Ordonez RN  Outcome: Ongoing  Goal: Absence of new skin breakdown  Description: Absence of new skin breakdown  6/24/2021 1719 by Nuria Ordonez RN  Outcome: Ongoing  6/24/2021 1112 by Nuria Ordonez RN  Outcome: Ongoing     Problem: Falls - Risk of:  Goal: Will remain free from falls  Description: Will remain free from falls  6/24/2021 1719 by Nuria Ordonez RN  Outcome: Ongoing  6/24/2021 1112 by Nuria Ordonez RN  Outcome: Ongoing  Goal: Absence of physical injury  Description: Absence of physical injury  6/24/2021 1719 by Nuria Ordonez RN  Outcome: Ongoing  6/24/2021 1112 by Nuria Ordonez RN  Outcome: Ongoing     Problem: Discharge Planning:  Goal: Discharged to appropriate level of care  Description: Discharged to appropriate level of care  6/24/2021 1719 by Nuria Ordonez RN  Outcome: Ongoing  6/24/2021 1112 by Nuria Ordonez RN  Outcome: Ongoing     Problem: Sensory:  Goal: General experience of comfort will improve  Description: General experience of comfort will improve  6/24/2021 1719 by Nuria Ordonez RN  Outcome: Ongoing  6/24/2021 1112 by Nuria Ordonez RN  Outcome: Ongoing     Problem: Urinary Elimination:  Goal: Signs and symptoms of infection will decrease  Description: Signs and symptoms of infection will decrease  6/24/2021 1719 by Nuria Ordonez RN  Outcome: Ongoing  6/24/2021 1112 by Nuria Ordonez RN  Outcome: Ongoing  Goal: Complications related to the disease process, condition or treatment will be avoided or minimized  Description: Complications related to the disease process, condition or treatment will be avoided or minimized  6/24/2021 1719 by Nuria Ordonez RN  Outcome: Ongoing  6/24/2021 1112 by Nuria Ordonez RN  Outcome: Ongoing     Problem: Serum Glucose Level - Abnormal:  Goal: Ability to maintain appropriate glucose levels will improve  Description: Ability to maintain appropriate glucose levels will improve  6/24/2021 1719 by Sherin Elizabeth RN  Outcome: Ongoing  6/24/2021 1112 by Sherin Elizabeth RN  Outcome: Ongoing     Problem: Sensory Perception - Impaired:  Goal: Ability to maintain a stable neurologic state will improve  Description: Ability to maintain a stable neurologic state will improve  6/24/2021 1719 by Sherin Elizabeth RN  Outcome: Ongoing  6/24/2021 1112 by Sherin Elizabeth RN  Outcome: Ongoing     Problem:  Activity Intolerance:  Goal: Ability to tolerate increased activity will improve  Description: Ability to tolerate increased activity will improve  6/24/2021 1719 by Sherin Elizabeth RN  Outcome: Ongoing  6/24/2021 1112 by Sherin Elizabeth RN  Outcome: Ongoing     Problem: Airway Clearance - Ineffective:  Goal: Ability to maintain a clear airway will improve  Description: Ability to maintain a clear airway will improve  6/24/2021 1719 by Sherin Elizabeth RN  Outcome: Ongoing  6/24/2021 1112 by Sherin Elizabeth RN  Outcome: Ongoing     Problem: Breathing Pattern - Ineffective:  Goal: Ability to achieve and maintain a regular respiratory rate will improve  Description: Ability to achieve and maintain a regular respiratory rate will improve  6/24/2021 1719 by Sherin Elizabeth RN  Outcome: Ongoing  6/24/2021 1112 by Sherin Elizabeth RN  Outcome: Ongoing     Problem: Gas Exchange - Impaired:  Goal: Levels of oxygenation will improve  Description: Levels of oxygenation will improve  6/24/2021 1719 by Sherin Elizabeth RN  Outcome: Ongoing  6/24/2021 1112 by Sherin Elizabeth RN  Outcome: Ongoing     Problem: OXYGENATION/RESPIRATORY FUNCTION  Goal: Patient will maintain patent airway  6/24/2021 1719 by Sherin Elizabeth RN  Outcome: Ongoing  6/24/2021 1112 by Sherin Elizabeth RN  Outcome: Ongoing  Goal: Patient will achieve/maintain normal respiratory rate/effort  Description: Respiratory rate and effort will be within normal limits for the patient  6/24/2021 1719 by Rochelle Zambrano RN  Outcome: Ongoing  6/24/2021 1112 by Rochelle Zambrano RN  Outcome: Ongoing     Problem: HEMODYNAMIC STATUS  Goal: Patient has stable vital signs and fluid balance  6/24/2021 1719 by Rochelle Zambrano RN  Outcome: Ongoing  6/24/2021 1112 by Rochelle Zambrano RN  Outcome: Ongoing     Problem: FLUID AND ELECTROLYTE IMBALANCE  Goal: Fluid and electrolyte balance are achieved/maintained  6/24/2021 1719 by Rochelle Zambrano RN  Outcome: Ongoing  6/24/2021 1112 by Rochelle Zambrano RN  Outcome: Ongoing     Problem: ACTIVITY INTOLERANCE/IMPAIRED MOBILITY  Goal: Mobility/activity is maintained at optimum level for patient  6/24/2021 1719 by Rochelle Zambrano RN  Outcome: Ongoing  6/24/2021 1112 by Rochelle Zambrano RN  Outcome: Ongoing     Problem: Pain:  Goal: Pain level will decrease  Description: Pain level will decrease  6/24/2021 1719 by Rochelle Zambrano RN  Outcome: Ongoing  6/24/2021 1112 by Rochelle Zambrano RN  Outcome: Ongoing  Goal: Control of acute pain  Description: Control of acute pain  6/24/2021 1719 by Rochelle Zambrano RN  Outcome: Ongoing  6/24/2021 1112 by Rochelle Zambrano RN  Outcome: Ongoing  Goal: Control of chronic pain  Description: Control of chronic pain  6/24/2021 1719 by Rochelle Zambrano RN  Outcome: Ongoing  6/24/2021 1112 by Rochelle Zambrano RN  Outcome: Ongoing     Problem: IP BALANCE  Goal: LTG - Patient will maintain balance to allow for safe/functional mobility  6/24/2021 1719 by Rochelle Zambrano RN  Outcome: Ongoing  6/24/2021 1112 by Rochelle Zambrano RN  Outcome: Ongoing     Problem: Nutrition  Goal: Optimal nutrition therapy  6/24/2021 1719 by Rochelle Zambrano RN  Outcome: Ongoing  6/24/2021 1112 by Rochelle Zambrano RN  Outcome: Ongoing     Problem: IP BALANCE  Goal: LTG - Patient will maintain balance to allow for safe/functional mobility  6/24/2021 1719 by Rochelle Zambrano RN  Outcome: Ongoing  6/24/2021 1112 by Rochelle Zambrano RN  Outcome: Ongoing  Goal: LTG - patient will maintain standing balance to allow for completion of daily activities  6/24/2021 1719 by Tricia Nash RN  Outcome: Ongoing  6/24/2021 1112 by Tricia Nash RN  Outcome: Ongoing  Goal: LTG - PATIENT WILL MAINTAIN SITTING POSITION WITH APPROPRIATE MID-LINE ORIENTATION  6/24/2021 1719 by Tricia Nash RN  Outcome: Ongoing  6/24/2021 1112 by Tricia Nash RN  Outcome: Ongoing     Problem: IP MOBILITY  Goal: LTG - patient will ambulate household distance  6/24/2021 1719 by Tricia Nash RN  Outcome: Ongoing  6/24/2021 1112 by Tricia Nash RN  Outcome: Ongoing  Goal: STG - Patient will ambulate  6/24/2021 1719 by Tricia Nash RN  Outcome: Ongoing  6/24/2021 1112 by Tricia Nash RN  Outcome: Ongoing     Problem: SAFETY  Goal: LTG - Patient will demonstrate safety requirements appropriate to situation/environment  6/24/2021 1719 by Tricia Nash RN  Outcome: Ongoing  6/24/2021 1112 by Tricia Nash RN  Outcome: Ongoing  Goal: LTG - patient will utilize safety techniques  6/24/2021 1719 by Tricia Nash RN  Outcome: Ongoing  6/24/2021 1112 by Tricia Nash RN  Outcome: Ongoing     Problem: IP TRANSFERS  Goal: LTG - patient will transfer to commode  6/24/2021 1719 by Tricia Nash RN  Outcome: Ongoing  6/24/2021 1112 by Tricia Nash RN  Outcome: Ongoing  Goal: STG - transfer from bed to chair  6/24/2021 1719 by Tricia Nash RN  Outcome: Ongoing  6/24/2021 1112 by Tricia Nash RN  Outcome: Ongoing  Goal: STG - patient will perform bed mobility  6/24/2021 1719 by Tricia Nash RN  Outcome: Ongoing  6/24/2021 1112 by Tricia Nash RN  Outcome: Ongoing  Goal: STG - patient will perform toilet transfer  6/24/2021 1719 by Tricia Nash, BENJAMIN  Outcome: Ongoing  6/24/2021 1112 by Tricia Nash RN  Outcome: Ongoing

## 2021-06-24 NOTE — CARE COORDINATION
Discharge Planning    Went in to explain to pt that her status here is observation; presented [facility] letter but pt does not understand the billing. Message left with son Latoya Zhou. Pt is going home with Laila Campuzano informed of probable d/c today. Waiting for DR to make rounds.

## 2021-06-24 NOTE — PROGRESS NOTES
Pt resting. insistent on discharging home today and requesting nurse call Dr. Robert Wing. Dr. Robert Wing called. Will see after office.

## 2021-07-21 PROBLEM — N39.0 UTI (URINARY TRACT INFECTION): Status: RESOLVED | Noted: 2021-06-21 | Resolved: 2021-07-21

## 2021-09-25 ENCOUNTER — HOSPITAL ENCOUNTER (EMERGENCY)
Facility: CLINIC | Age: 86
Discharge: HOME | End: 2021-09-25
Attending: EMERGENCY MEDICINE
Payer: MEDICARE

## 2021-09-25 VITALS
DIASTOLIC BLOOD PRESSURE: 69 MMHG | RESPIRATION RATE: 18 BRPM | HEIGHT: 63 IN | HEART RATE: 76 BPM | WEIGHT: 118 LBS | SYSTOLIC BLOOD PRESSURE: 159 MMHG | BODY MASS INDEX: 20.91 KG/M2 | TEMPERATURE: 97.8 F | OXYGEN SATURATION: 98 %

## 2021-09-25 DIAGNOSIS — S51.011A SKIN TEAR OF RIGHT ELBOW WITHOUT COMPLICATION, INITIAL ENCOUNTER: Primary | ICD-10-CM

## 2021-09-25 DIAGNOSIS — T14.8XXA INFECTED SKIN TEAR: ICD-10-CM

## 2021-09-25 DIAGNOSIS — L08.9 INFECTED SKIN TEAR: ICD-10-CM

## 2021-09-25 PROCEDURE — 99283 EMERGENCY DEPT VISIT LOW MDM: CPT

## 2021-09-25 RX ORDER — DOXYCYCLINE HYCLATE 100 MG/1
100 CAPSULE ORAL 2 TIMES DAILY
Qty: 14 CAPSULE | Refills: 0 | Status: SHIPPED | OUTPATIENT
Start: 2021-09-25 | End: 2021-10-02

## 2021-09-25 ASSESSMENT — ENCOUNTER SYMPTOMS
CONSTIPATION: 0
DIARRHEA: 0
NAUSEA: 0
EYE PAIN: 0
VOMITING: 0
BLOOD IN STOOL: 0
BACK PAIN: 0
ABDOMINAL PAIN: 0
SHORTNESS OF BREATH: 0
COUGH: 0

## 2021-09-25 NOTE — ED PROVIDER NOTES
[facility]  [facility]  Phone: [phone]        Pt Name: Ashley Leija  MRN: [id number]  [place] 2/12/1932  Date of evaluation: 9/25/21      CHIEF COMPLAINT       Chief Complaint   Patient presents with    Wound Check     right shin         HISTORY OF PRESENT ILLNESS    Ashley Leija is a 80 y.o. female who presents with injury to her right shin she said she bumped it a couple days ago is been bleeding on and off she is on blood thinner she denies any other injury she did not fall she is kind of bumped it she also bumped her right elbow earlier this been no fevers chills no cough no shortness of breath      REVIEW OF SYSTEMS         Review of Systems   Constitutional: Positive for appetite change. Negative for chills and fever. She states her appetite is not as good as it used to be   HENT: Negative for congestion and ear pain. Eyes: Negative for pain and visual disturbance. Respiratory: Negative for cough and shortness of breath. Cardiovascular: Negative for chest pain, palpitations and leg swelling. Gastrointestinal: Negative for abdominal pain, blood in stool, constipation, diarrhea, nausea and vomiting. Endocrine: Negative for polydipsia and polyuria. Genitourinary: Negative for difficulty urinating, dysuria and frequency. Musculoskeletal: Negative for back pain, joint swelling, myalgias, neck pain and neck stiffness. Laceration right lower leg also abrasion right elbow   Skin: Negative for rash. Neurological: Negative for dizziness, weakness and headaches. Hematological: Negative for adenopathy. Bruises/bleeds easily. Psychiatric/Behavioral: Negative for confusion, self-injury and suicidal ideas.          PAST MEDICAL HISTORY    has a past medical history of Atrial fibrillation ([address] CAD (coronary artery disease), CHF (congestive heart failure) ([address] COPD (chronic obstructive pulmonary disease) ([address] CVA (cerebral vascular accident) [facility]), Esophageal reflux, Other and unspecified hyperlipidemia, Other primary cardiomyopathies, Type II or unspecified type diabetes mellitus without mention of complication, not stated as uncontrolled, Unspecified asthma(493.90), and Unspecified essential hypertension. SURGICAL HISTORY      has a past surgical history that includes Coronary artery bypass graft; pacemaker placement (2014); Hysterectomy; Wrist fracture surgery (Left, 01/25/2019); and Wrist Closed Reduction (Left, 1/25/2019). CURRENT MEDICATIONS       Previous Medications    ACETAMINOPHEN-CODEINE (TYLENOL #3) 300-30 MG PER TABLET    Take 1-2 tablets by mouth every 4 hours as needed for Pain.      ALBUTEROL (PROVENTIL) (2.5 MG/3ML) 0.083% NEBULIZER SOLUTION    Take 2.5 mg by nebulization every 6 hours as needed for Wheezing    ALBUTEROL SULFATE  (90 BASE) MCG/ACT INHALER    Inhale 2 puffs into the lungs every 6 hours as needed for Wheezing    APIXABAN (ELIQUIS) 2.5 MG TABS TABLET    Take 1 tablet by mouth 2 times daily    CALCIUM CARBONATE-VITAMIN D (CALCIUM + D PO)    Take 1 tablet by mouth daily     CARVEDILOL (COREG) 6.25 MG TABLET    Take 1 tablet by mouth 2 times daily    FERROUS SULFATE (FE TABS 325) 325 (65 FE) MG EC TABLET    Take 325 mg by mouth daily (with breakfast)    FLUTICASONE-UMECLIDIN-VILANT (TRELEGY ELLIPTA) 200-62.5-25 MCG/INH AEPB    Inhale 1 puff into the lungs daily    IPRATROPIUM (ATROVENT) 0.03 % NASAL SPRAY    1-2 sprays by Each Nostril route 4 times daily as needed for Rhinitis    LISINOPRIL (PRINIVIL;ZESTRIL) 10 MG TABLET    Take 10 mg by mouth daily    OMEPRAZOLE (PRILOSEC) 20 MG DELAYED RELEASE CAPSULE    Take 20 mg by mouth Daily     PRAVASTATIN (PRAVACHOL) 80 MG TABLET    Take 80 mg by mouth daily     PSEUDOEPHEDRINE-GUAIFENESIN (MUCINEX D)  MG PER EXTENDED RELEASE TABLET    Take 1 tablet by mouth 2 times daily as needed for Congestion     VITAMIN D3 (CHOLECALCIFEROL) 25 MCG (1000 UT) TABS TABLET    Take 1,000 Units by mouth daily        ALLERGIES     is allergic to Aníbal Schein tartrate], amoxicillin, bee venom, and metformin and related. FAMILY HISTORY     She indicated that her mother is . She indicated that her father is . family history includes Diabetes in her father. SOCIAL HISTORY      reports that she has never smoked. She has never used smokeless tobacco. She reports that she does not drink alcohol and does not use drugs. PHYSICAL EXAM     INITIAL VITALS:  height is 5' 3\" (1.6 m) and weight is 53.5 kg (118 lb). Her oral temperature is 97.8 °F (36.6 °C). Her blood pressure is 159/69 (abnormal) and her pulse is 76. Her respiration is 18 and oxygen saturation is 98%. Physical Exam  Constitutional:       General: She is not in acute distress. Appearance: Normal appearance. She is well-developed. She is not ill-appearing or diaphoretic. HENT:      Head: Normocephalic and atraumatic. Eyes:      Conjunctiva/sclera: Conjunctivae normal.      Pupils: Pupils are equal, round, and reactive to light. Cardiovascular:      Rate and Rhythm: Normal rate and regular rhythm. Pulmonary:      Effort: Pulmonary effort is normal.      Breath sounds: Normal breath sounds. Abdominal:      General: Bowel sounds are normal. There is no distension. Palpations: Abdomen is soft. Tenderness: There is no abdominal tenderness. Musculoskeletal:         General: No tenderness. Normal range of motion. Cervical back: Normal range of motion.       Comments: Examination of her upper extremities she has good range of motion the shoulder elbow and wrist bilaterally she has a superficial skin tear that she is dressed to her right elbow its not bleeding on her lower extremities her left lower extremities unremarkable her right she does have a skin tear right anterior shin with a little bit of erythema may be secondary infection there is nothing that can be closed there is no evidence of bony injury no tenderness at the ankle knee or hip   Skin:     General: Skin is warm and dry. Neurological:      General: No focal deficit present. Mental Status: She is alert and oriented to person, place, and time. Psychiatric:         Mood and Affect: Mood normal.         Behavior: Behavior normal.           DIFFERENTIAL DIAGNOSIS/ MDM:     Skin tear right lower leg will clean the wound up Surgicel dressing is she is on anticoagulant and put on a couple days worth of antibiotics    DIAGNOSTIC RESULTS     EKG: All EKG's are interpreted by the Emergency Department Physician who either signs or Co-signs this chart in the absence of a cardiologist.        RADIOLOGY:   Non-plain film images such as CT, Ultrasound and MRI are read by the radiologist. Plain radiographic images are visualized and the radiologist interpretations are reviewed as follows:         LABS:  No results found for this visit on 09/25/21. EMERGENCY DEPARTMENT COURSE:   Vitals:    Vitals:    09/25/21 1604   BP: (!) 159/69   Pulse: 76   Resp: 18   Temp: 97.8 °F (36.6 °C)   TempSrc: Oral   SpO2: 98%   Weight: 53.5 kg (118 lb)   Height: 5' 3\" (1.6 m)     -------------------------  BP: (!) 159/69, Temp: 97.8 °F (36.6 °C), Pulse: 76, Resp: 18          CONSULTS:      PROCEDURES:  None    FINAL IMPRESSION      1. Skin tear of right elbow without complication, initial encounter    2.  Infected skin tear          DISPOSITION/PLAN   Discharged in stable condition    PATIENT REFERRED TO:  Soco Osorio, [facility] [address]  [phone]    In 3 days        DISCHARGE MEDICATIONS:  New Prescriptions    DOXYCYCLINE HYCLATE (VIBRAMYCIN) 100 MG CAPSULE    Take 1 capsule by mouth 2 times daily for 7 days       (Please note that portions of this note were completed with a voice recognition program.  Efforts were made to edit the dictations but occasionally words are mis-transcribed.)    Jennifer Johnson

## 2023-03-29 ENCOUNTER — HOSPITAL ENCOUNTER (OUTPATIENT)
Facility: CLINIC | Age: 88
Setting detail: SPECIMEN
Discharge: HOME | End: 2023-03-29
Payer: MEDICARE

## 2023-03-29 LAB
ABSOLUTE EOS #: 0.26 K/UL (ref 0–0.44)
ABSOLUTE IMMATURE GRANULOCYTE: 0.05 K/UL (ref 0–0.3)
ABSOLUTE LYMPH #: 1.67 K/UL (ref 1.1–3.7)
ABSOLUTE MONO #: 0.86 K/UL (ref 0.1–1.2)
ALBUMIN SERPL-MCNC: 3.7 G/DL (ref 3.5–5.2)
ALBUMIN/GLOBULIN RATIO: 1.1 (ref 1–2.5)
ALP SERPL-CCNC: 63 U/L (ref 35–104)
ALT SERPL-CCNC: 10 U/L (ref 5–33)
ANION GAP SERPL CALCULATED.3IONS-SCNC: 13 MMOL/L (ref 9–17)
AST SERPL-CCNC: 21 U/L
BASOPHILS # BLD: 1 % (ref 0–2)
BASOPHILS ABSOLUTE: 0.07 K/UL (ref 0–0.2)
BILIRUB SERPL-MCNC: 0.2 MG/DL (ref 0.3–1.2)
BUN SERPL-MCNC: 12 MG/DL (ref 8–23)
CALCIUM SERPL-MCNC: 9.4 MG/DL (ref 8.6–10.4)
CHLORIDE SERPL-SCNC: 99 MMOL/L (ref 98–107)
CO2 SERPL-SCNC: 23 MMOL/L (ref 20–31)
CREAT SERPL-MCNC: 0.75 MG/DL (ref 0.5–0.9)
EOSINOPHILS RELATIVE PERCENT: 3 % (ref 1–4)
GFR SERPL CREATININE-BSD FRML MDRD: >60 ML/MIN/1.73M2
GLUCOSE SERPL-MCNC: 106 MG/DL (ref 70–99)
HCT VFR BLD AUTO: 36.3 % (ref 36.3–47.1)
HGB BLD-MCNC: 11.8 G/DL (ref 11.9–15.1)
IMMATURE GRANULOCYTES: 1 %
LYMPHOCYTES # BLD: 21 % (ref 24–43)
MCH RBC QN AUTO: 32 PG (ref 25.2–33.5)
MCHC RBC AUTO-ENTMCNC: 32.5 G/DL (ref 28.4–34.8)
MCV RBC AUTO: 98.4 FL (ref 82.6–102.9)
MONOCYTES # BLD: 11 % (ref 3–12)
NRBC AUTOMATED: 0 PER 100 WBC
PDW BLD-RTO: 12.4 % (ref 11.8–14.4)
PLATELET # BLD AUTO: 241 K/UL (ref 138–453)
PMV BLD AUTO: 10 FL (ref 8.1–13.5)
POTASSIUM SERPL-SCNC: 4 MMOL/L (ref 3.7–5.3)
PROT SERPL-MCNC: 7.2 G/DL (ref 6.4–8.3)
RBC # BLD: 3.69 M/UL (ref 3.95–5.11)
SEG NEUTROPHILS: 63 % (ref 36–65)
SEGMENTED NEUTROPHILS ABSOLUTE COUNT: 5.13 K/UL (ref 1.5–8.1)
SODIUM SERPL-SCNC: 135 MMOL/L (ref 135–144)
WBC # BLD AUTO: 8 K/UL (ref 3.5–11.3)

## 2023-03-29 PROCEDURE — 80053 COMPREHEN METABOLIC PANEL: CPT

## 2023-03-29 PROCEDURE — 85025 COMPLETE CBC W/AUTO DIFF WBC: CPT

## 2023-06-02 ENCOUNTER — HOSPITAL ENCOUNTER (OUTPATIENT)
Age: 88
Setting detail: SPECIMEN
Discharge: HOME | End: 2023-06-02

## 2023-06-02 LAB
ALBUMIN SERPL-MCNC: 3.7 G/DL (ref 3.5–5.2)
ALBUMIN/GLOB SERPL: 1.1 {RATIO} (ref 1–2.5)
ALP SERPL-CCNC: 65 U/L (ref 35–104)
ALT SERPL-CCNC: 10 U/L (ref 5–33)
ANION GAP SERPL CALCULATED.3IONS-SCNC: 15 MMOL/L (ref 9–17)
AST SERPL-CCNC: 19 U/L
BILIRUB SERPL-MCNC: 0.3 MG/DL (ref 0.3–1.2)
BUN SERPL-MCNC: 16 MG/DL (ref 8–23)
CALCIUM SERPL-MCNC: 9.5 MG/DL (ref 8.6–10.4)
CHLORIDE SERPL-SCNC: 104 MMOL/L (ref 98–107)
CO2 SERPL-SCNC: 21 MMOL/L (ref 20–31)
CREAT SERPL-MCNC: 0.73 MG/DL (ref 0.5–0.9)
ERYTHROCYTE [DISTWIDTH] IN BLOOD BY AUTOMATED COUNT: 12.6 % (ref 11.8–14.4)
GFR SERPL CREATININE-BSD FRML MDRD: >60 ML/MIN/1.73M2
GLUCOSE SERPL-MCNC: 129 MG/DL (ref 70–99)
HCT VFR BLD AUTO: 41.1 % (ref 36.3–47.1)
HGB BLD-MCNC: 12.9 G/DL (ref 11.9–15.1)
IRON SATN MFR SERPL: 33 % (ref 20–55)
IRON SERPL-MCNC: 85 UG/DL (ref 37–145)
MCH RBC QN AUTO: 31.5 PG (ref 25.2–33.5)
MCHC RBC AUTO-ENTMCNC: 31.4 G/DL (ref 28.4–34.8)
MCV RBC AUTO: 100.5 FL (ref 82.6–102.9)
NRBC AUTOMATED: 0 PER 100 WBC
PLATELET # BLD AUTO: 226 K/UL (ref 138–453)
PMV BLD AUTO: 11.2 FL (ref 8.1–13.5)
POTASSIUM SERPL-SCNC: 3.8 MMOL/L (ref 3.7–5.3)
PROT SERPL-MCNC: 7.1 G/DL (ref 6.4–8.3)
RBC # BLD AUTO: 4.09 M/UL (ref 3.95–5.11)
SODIUM SERPL-SCNC: 140 MMOL/L (ref 135–144)
TIBC SERPL-MCNC: 256 UG/DL (ref 250–450)
UNSATURATED IRON BINDING CAPACITY: 171 UG/DL (ref 112–347)
WBC OTHER # BLD: 6.3 K/UL (ref 3.5–11.3)

## 2023-06-08 ENCOUNTER — HOSPITAL ENCOUNTER (EMERGENCY)
Age: 88
Discharge: HOME | End: 2023-06-09
Attending: EMERGENCY MEDICINE
Payer: MEDICARE

## 2023-06-08 DIAGNOSIS — R42 DIZZINESS: Primary | ICD-10-CM

## 2023-06-08 PROCEDURE — 93005 ELECTROCARDIOGRAM TRACING: CPT | Performed by: EMERGENCY MEDICINE

## 2023-06-08 ASSESSMENT — PAIN - FUNCTIONAL ASSESSMENT: PAIN_FUNCTIONAL_ASSESSMENT: NONE - DENIES PAIN

## 2023-06-09 ENCOUNTER — APPOINTMENT (OUTPATIENT)
Dept: CT IMAGING | Age: 88
End: 2023-06-09
Payer: MEDICARE

## 2023-06-09 VITALS
HEART RATE: 72 BPM | TEMPERATURE: 97.5 F | WEIGHT: 125 LBS | DIASTOLIC BLOOD PRESSURE: 62 MMHG | BODY MASS INDEX: 22.15 KG/M2 | SYSTOLIC BLOOD PRESSURE: 107 MMHG | OXYGEN SATURATION: 98 % | RESPIRATION RATE: 21 BRPM | HEIGHT: 63 IN

## 2023-06-09 LAB
ANION GAP SERPL CALCULATED.3IONS-SCNC: 10 MMOL/L (ref 9–17)
BASOPHILS # BLD: 0.05 K/UL (ref 0–0.2)
BASOPHILS NFR BLD: 1 % (ref 0–2)
BUN SERPL-MCNC: 26 MG/DL (ref 8–23)
BUN/CREAT SERPL: 31 (ref 9–20)
CALCIUM SERPL-MCNC: 9.2 MG/DL (ref 8.6–10.4)
CHLORIDE SERPL-SCNC: 103 MMOL/L (ref 98–107)
CO2 SERPL-SCNC: 25 MMOL/L (ref 20–31)
CREAT SERPL-MCNC: 0.83 MG/DL (ref 0.5–0.9)
EKG ATRIAL RATE: 69 BPM
EKG Q-T INTERVAL: 370 MS
EKG QRS DURATION: 80 MS
EKG QTC CALCULATION (BAZETT): 402 MS
EKG R AXIS: 14 DEGREES
EKG T AXIS: -12 DEGREES
EKG VENTRICULAR RATE: 71 BPM
EOSINOPHIL # BLD: 0.15 K/UL (ref 0–0.44)
EOSINOPHILS RELATIVE PERCENT: 3 % (ref 1–4)
ERYTHROCYTE [DISTWIDTH] IN BLOOD BY AUTOMATED COUNT: 12.4 % (ref 11.8–14.4)
GFR SERPL CREATININE-BSD FRML MDRD: >60 ML/MIN/1.73M2
GLUCOSE SERPL-MCNC: 121 MG/DL (ref 70–99)
HCT VFR BLD AUTO: 36.6 % (ref 36.3–47.1)
HGB BLD-MCNC: 11.7 G/DL (ref 11.9–15.1)
IMM GRANULOCYTES # BLD AUTO: 0.05 K/UL (ref 0–0.3)
IMM GRANULOCYTES NFR BLD: 1 %
LYMPHOCYTES # BLD: 26 % (ref 24–43)
LYMPHOCYTES NFR BLD: 1.42 K/UL (ref 1.1–3.7)
MCH RBC QN AUTO: 32.1 PG (ref 25.2–33.5)
MCHC RBC AUTO-ENTMCNC: 32 G/DL (ref 28.4–34.8)
MCV RBC AUTO: 100.3 FL (ref 82.6–102.9)
MONOCYTES NFR BLD: 0.61 K/UL (ref 0.1–1.2)
MONOCYTES NFR BLD: 11 % (ref 3–12)
NEUTROPHILS NFR BLD: 58 % (ref 36–65)
NEUTS SEG NFR BLD: 3.13 K/UL (ref 1.5–8.1)
NRBC AUTOMATED: 0 PER 100 WBC
PLATELET # BLD AUTO: 206 K/UL (ref 138–453)
PMV BLD AUTO: 10.3 FL (ref 8.1–13.5)
POTASSIUM SERPL-SCNC: 3.8 MMOL/L (ref 3.7–5.3)
RBC # BLD AUTO: 3.65 M/UL (ref 3.95–5.11)
SODIUM SERPL-SCNC: 138 MMOL/L (ref 135–144)
TROPONIN I SERPL HS-MCNC: 12 NG/L (ref 0–14)
WBC OTHER # BLD: 5.4 K/UL (ref 3.5–11.3)

## 2023-06-09 PROCEDURE — 85027 COMPLETE CBC AUTOMATED: CPT

## 2023-06-09 PROCEDURE — 84484 ASSAY OF TROPONIN QUANT: CPT

## 2023-06-09 PROCEDURE — 80048 BASIC METABOLIC PNL TOTAL CA: CPT

## 2023-06-09 PROCEDURE — 2580000003 HC RX 258: Performed by: EMERGENCY MEDICINE

## 2023-06-09 PROCEDURE — 70450 CT HEAD/BRAIN W/O DYE: CPT

## 2023-06-09 RX ORDER — 0.9 % SODIUM CHLORIDE 0.9 %
500 INTRAVENOUS SOLUTION INTRAVENOUS ONCE
Status: COMPLETED | OUTPATIENT
Start: 2023-06-09 | End: 2023-06-09

## 2023-06-09 RX ADMIN — SODIUM CHLORIDE 500 ML: 9 INJECTION, SOLUTION INTRAVENOUS at 00:25

## 2023-06-09 NOTE — ED PROVIDER NOTES
EMERGENCY DEPARTMENT ENCOUNTER    Pt Name: Dara Collado  MRN: [id number]  [place] 2/12/1932  Date of evaluation: 6/9/23  CHIEF COMPLAINT       Chief Complaint   Patient presents with    Dizziness     Felt the room spinning so fast. Denies pain     HISTORY OF PRESENT ILLNESS   80-year-old female presents emergency room for dizziness. Patient was at home tonight. She was sitting when she got to get up she felt extreme dizziness. She felt like she was going to pass out. She followed a bit wobbly. Patient was able to sit back down before she fell. She does have history of syncope but did not fully pass out tonight. No head injury. Patient did not experience shortness of breath or chest pain with it. The dizziness is not present when patient is sitting. REVIEW OF SYSTEMS     Review of Systems   Neurological:  Positive for dizziness. PASTMEDICAL HISTORY     Past Medical History:   Diagnosis Date    Atrial fibrillation (HCC)     CAD (coronary artery disease)     CHF (congestive heart failure) (HCC)     COPD (chronic obstructive pulmonary disease) (HCC)     CVA (cerebral vascular accident) [address] [address]     Esophageal reflux     Other and unspecified hyperlipidemia     Other primary cardiomyopathies     Type II or unspecified type diabetes mellitus without mention of complication, not stated as uncontrolled     Unspecified asthma(493.90)     Unspecified essential hypertension      Past Problem List  Patient Active Problem List   Diagnosis Code    Atrial fibrillation ([facility] [address] I48.91    Pacemaker Z95.0    CAD (coronary artery disease) I25.10    Hyperlipidemia E78.5    Hx of CABG Z95.1    H/O: CVA (cerebrovascular accident) Z86.73    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 XXXA, Y92.009    COPD (chronic
(*)     All other components within normal limits   BASIC METABOLIC PANEL - Abnormal; Notable for the following components:    Glucose 121 (*)     BUN 26 (*)     Bun/Cre Ratio 31 (*)     All other components within normal limits   TROPONIN           Disposition   DISPOSITION:    DISPOSITION Decision To Discharge 06/09/2023 04:08:12 AM      CLINICAL IMPRESSION:  1. Dizziness        PATIENT REFERRED TO:  No follow-up provider specified. DISCHARGE MEDICATIONS:  New Prescriptions    No medications on file     The care is provided during an unprecedented national emergency due to the novel coronavirus, COVID 19.   Asher Granger MD  Attending Emergency Physician             Asher Granger MD  82/29/58 0087

## 2023-06-09 NOTE — ED NOTES
Writer attempted to call facility and was told to call back at a later time due to office being closed.      Patricio Palacio RN  06/09/23 1566

## 2023-06-09 NOTE — ED NOTES
Pt arrived to ED by EMS from home for dizziness. Pt states she was standing and the room was spinning really fast. Pt states she has never had this happen before. EMS states they didn't get a orthostatic because she was to weak to stand. Pt is A&Ox4.       Toña Lyman RN  06/09/23 9491

## (undated) DEVICE — Device

## (undated) DEVICE — PADDING,UNDERCAST,COTTON, 3X4YD STERILE: Brand: MEDLINE

## (undated) DEVICE — GLOVE ORANGE PI 7 1/2   MSG9075

## (undated) DEVICE — GLOVE SURG SZ 65 THK91MIL LTX FREE SYN POLYISOPRENE

## (undated) DEVICE — TUBING SUCT 12FR MAL ALUM SHFT FN CAP VENT UNIV CONN W/ OBT

## (undated) DEVICE — DRAPE C ARM UNIV W41XL74IN CLR PLAS XR VELC CLSR POLY STRP

## (undated) DEVICE — GLOVE SURG SZ 6 THK91MIL LTX FREE SYN POLYISOPRENE ANTI

## (undated) DEVICE — DRESSING PETRO W3XL3IN OIL EMUL N ADH GZ KNIT IMPREG CELOS

## (undated) DEVICE — SVMMC HND

## (undated) DEVICE — GLOVE ORANGE PI 7   MSG9070

## (undated) DEVICE — SINGLE USE DEVICE INTENDED TO COVER EXPOSED ENDS OF ORTHOPEDIC PIN AND K-WIRES TO HELP PROTECT THE EXPOSED WIRE FROM SNAGGING ON CLOTHING.: Brand: OXBORO™ PIN COVER

## (undated) DEVICE — CHLORAPREP 26ML ORANGE

## (undated) DEVICE — DRESSING,GAUZE,XEROFORM,CURAD,1"X8",ST: Brand: CURAD